# Patient Record
Sex: FEMALE | Race: BLACK OR AFRICAN AMERICAN | Employment: UNEMPLOYED | URBAN - METROPOLITAN AREA
[De-identification: names, ages, dates, MRNs, and addresses within clinical notes are randomized per-mention and may not be internally consistent; named-entity substitution may affect disease eponyms.]

---

## 2017-08-25 ENCOUNTER — APPOINTMENT (OUTPATIENT)
Dept: GENERAL RADIOLOGY | Age: 57
End: 2017-08-25
Attending: PHYSICIAN ASSISTANT
Payer: COMMERCIAL

## 2017-08-25 ENCOUNTER — HOSPITAL ENCOUNTER (EMERGENCY)
Age: 57
Discharge: HOME | End: 2017-08-25
Attending: EMERGENCY MEDICINE | Admitting: EMERGENCY MEDICINE
Payer: COMMERCIAL

## 2017-08-25 VITALS
TEMPERATURE: 97.9 F | BODY MASS INDEX: 41.06 KG/M2 | HEIGHT: 66 IN | OXYGEN SATURATION: 100 % | SYSTOLIC BLOOD PRESSURE: 149 MMHG | WEIGHT: 255.51 LBS | DIASTOLIC BLOOD PRESSURE: 84 MMHG | RESPIRATION RATE: 18 BRPM

## 2017-08-25 DIAGNOSIS — M17.0 PRIMARY OSTEOARTHRITIS OF BOTH KNEES: Primary | ICD-10-CM

## 2017-08-25 DIAGNOSIS — M25.462 EFFUSION, LEFT KNEE: ICD-10-CM

## 2017-08-25 DIAGNOSIS — M25.561 ACUTE BILATERAL KNEE PAIN: ICD-10-CM

## 2017-08-25 DIAGNOSIS — M25.562 ACUTE BILATERAL KNEE PAIN: ICD-10-CM

## 2017-08-25 PROCEDURE — 73562 X-RAY EXAM OF KNEE 3: CPT

## 2017-08-25 PROCEDURE — 99282 EMERGENCY DEPT VISIT SF MDM: CPT

## 2017-08-25 PROCEDURE — 74011636637 HC RX REV CODE- 636/637: Performed by: PHYSICIAN ASSISTANT

## 2017-08-25 PROCEDURE — 74011250637 HC RX REV CODE- 250/637: Performed by: PHYSICIAN ASSISTANT

## 2017-08-25 RX ORDER — NAPROXEN 250 MG/1
500 TABLET ORAL
Status: COMPLETED | OUTPATIENT
Start: 2017-08-25 | End: 2017-08-25

## 2017-08-25 RX ORDER — MELOXICAM 15 MG/1
15 TABLET ORAL DAILY
Qty: 30 TAB | Refills: 0 | Status: SHIPPED | OUTPATIENT
Start: 2017-08-25 | End: 2020-09-26

## 2017-08-25 RX ORDER — HYDROCODONE BITARTRATE AND ACETAMINOPHEN 5; 325 MG/1; MG/1
1 TABLET ORAL
Qty: 20 TAB | Refills: 0 | Status: SHIPPED | OUTPATIENT
Start: 2017-08-25 | End: 2020-09-26

## 2017-08-25 RX ORDER — GUAIFENESIN 100 MG/5ML
81 LIQUID (ML) ORAL
COMMUNITY
End: 2021-09-17

## 2017-08-25 RX ORDER — CETIRIZINE HCL 10 MG
10 TABLET ORAL
COMMUNITY

## 2017-08-25 RX ORDER — HYDROCODONE BITARTRATE AND ACETAMINOPHEN 5; 325 MG/1; MG/1
1 TABLET ORAL
Status: COMPLETED | OUTPATIENT
Start: 2017-08-25 | End: 2017-08-25

## 2017-08-25 RX ORDER — PREDNISONE 20 MG/1
60 TABLET ORAL
Status: COMPLETED | OUTPATIENT
Start: 2017-08-25 | End: 2017-08-25

## 2017-08-25 RX ORDER — DICLOFENAC SODIUM 75 MG/1
75 TABLET, DELAYED RELEASE ORAL
COMMUNITY
End: 2017-08-25 | Stop reason: ALTCHOICE

## 2017-08-25 RX ORDER — PREDNISONE 5 MG/1
TABLET ORAL
Qty: 21 TAB | Refills: 0 | Status: SHIPPED | OUTPATIENT
Start: 2017-08-25 | End: 2021-09-17

## 2017-08-25 RX ADMIN — HYDROCODONE BITARTRATE AND ACETAMINOPHEN 1 TABLET: 5; 325 TABLET ORAL at 18:04

## 2017-08-25 RX ADMIN — PREDNISONE 60 MG: 20 TABLET ORAL at 19:23

## 2017-08-25 RX ADMIN — NAPROXEN 500 MG: 250 TABLET ORAL at 18:04

## 2017-08-25 NOTE — ED NOTES
Pt reports to the ED for c/c of bilateral knee pain ongoing for months. Pt reports left knee \"popping\" and  unable to bend, hurts when walking. Pt reports taking OTC and Voltaren with no relief. Pt currently being seeing by [facility]. Pt denies injuries to the knees. Call bell within reach.

## 2017-08-25 NOTE — LETTER
[facility] EMERGENCY DEPT 
[address] [place] [address] [address] [id number] [phone] Work/School Note Date: 8/25/2017 To Whom It May concern: 
 
Rochelle Butler was seen and treated today in the emergency room by the following provider(s): 
Attending Provider: Lauren Mooney MD 
Physician Assistant: MIROSLAVA Thurston. Rochelle Butler may return to work on 8/28/17 or sooner, if feeling better. Sincerely, Joseluis Zavala PA

## 2017-08-25 NOTE — ED NOTES
MIROSLAVA Matta has reviewed discharge instructions with the patient. The patient verbalized understanding. Pt discharged with written instructions. No further concerns at this time. Pt given prescriptions and ED excuse note. Pt ambulatory to exit with steady gait.

## 2017-08-25 NOTE — DISCHARGE INSTRUCTIONS
Knee Arthritis: Care Instructions  Your Care Instructions  Knee arthritis is a breakdown of the cartilage that cushions your knee joint. When the cartilage wears down, your bones rub against each other. This causes pain and stiffness. Knee arthritis tends to get worse with time. Treatment for knee arthritis involves reducing pain, making the leg muscles stronger, and staying at a healthy body weight. The treatment usually does not improve the health of the cartilage, but it can reduce pain and improve how well your knee works. You can take simple measures to protect your knee joints, ease your pain, and help you stay active. Follow-up care is a key part of your treatment and safety. Be sure to make and go to all appointments, and call your doctor if you are having problems. It's also a good idea to know your test results and keep a list of the medicines you take. How can you care for yourself at home? · Know that knee arthritis will cause more pain on some days than on others. · Stay at a healthy weight. Lose weight if you are overweight. When you stand up, the pressure on your knees from every pound of body weight is multiplied four times. So if you lose 10 pounds, you will reduce the pressure on your knees by 40 pounds. · Talk to your doctor or physical therapist about exercises that will help ease joint pain. ¨ Stretch to help prevent stiffness and to prevent injury before you exercise. You may enjoy gentle forms of yoga to help keep your knee joints and muscles flexible. ¨ Walk instead of jog. ¨ Ride a bike. This makes your thigh muscles stronger and takes pressure off your knee. ¨ Wear well-fitting and comfortable shoes. ¨ Exercise in chest-deep water. This can help you exercise longer with less pain. ¨ Avoid exercises that include squatting or kneeling. They can put a lot of strain on your knees.   ¨ Talk to your doctor to make sure that the exercise you do is not making the arthritis worse.  · Do not sit for long periods of time. Try to walk once in a while to keep your knee from getting stiff. · Ask your doctor or physical therapist whether shoe inserts may reduce your arthritis pain. · If you can afford it, get new athletic shoes at least every year. This can help reduce the strain on your knees. · Use a device to help you do everyday activities. ¨ A cane or walking stick can help you keep your balance when you walk. Hold the cane or walking stick in the hand opposite the painful knee. ¨ If you feel like you may fall when you walk, try using crutches or a front-wheeled walker. These can prevent falls that could cause more damage to your knee. ¨ A knee brace may help keep your knee stable and prevent pain. ¨ You also can use other things to make life easier, such as a higher toilet seat and handrails in the bathtub or shower. · Take pain medicines exactly as directed. ¨ Do not wait until you are in severe pain. You will get better results if you take it sooner. ¨ If you are not taking a prescription pain medicine, take an over-the-counter medicine such as acetaminophen (Tylenol), ibuprofen (Advil, Motrin), or naproxen (Aleve). Read and follow all instructions on the label. ¨ Do not take two or more pain medicines at the same time unless the doctor told you to. Many pain medicines have acetaminophen, which is Tylenol. Too much acetaminophen (Tylenol) can be harmful. ¨ Tell your doctor if you take a blood thinner, have diabetes, or have allergies to shellfish. · Ask your doctor if you might benefit from a shot of steroid medicine into your knee. This may provide pain relief for several months. · Many people take the supplements glucosamine and chondroitin for osteoarthritis. Some people feel they help, but the medical research does not show that they work. Talk to your doctor before you take these supplements. When should you call for help?   Call your doctor now or seek immediate medical care if:  · You have sudden swelling, warmth, or pain in your knee. · You have knee pain and a fever or rash. · You have such bad pain that you cannot use your knee. Watch closely for changes in your health, and be sure to contact your doctor if you have any problems. Where can you learn more? Go to http://terrance-ankur.info/. Enter Z469 in the search box to learn more about \"Knee Arthritis: Care Instructions. \"  Current as of: October 31, 2016  Content Version: 11.3  © 9282-9815 [employer]. Care instructions adapted under license by [employer] (which disclaims liability or warranty for this information). If you have questions about a medical condition or this instruction, always ask your healthcare professional. [place] [address] any warranty or liability for your use of this information. Knee Arthritis: Exercises  Your Care Instructions  Here are some examples of exercises for knee arthritis. Start each exercise slowly. Ease off the exercise if you start to have pain. Your doctor or physical therapist will tell you when you can start these exercises and which ones will work best for you. How to do the exercises  Knee flexion with heel slide    1. Lie on your back with your knees bent. 2. Slide your heel back by bending your affected knee as far as you can. Then hook your other foot around your ankle to help pull your heel even farther back. 3. Hold for about 6 seconds, then rest for up to 10 seconds. 4. Repeat 8 to 12 times. 5. Switch legs and repeat steps 1 through 4, even if only one knee is sore. Quad sets    1. Sit with your affected leg straight and supported on the floor or a firm bed. Place a small, rolled-up towel under your knee. Your other leg should be bent, with that foot flat on the floor. 2. Tighten the thigh muscles of your affected leg by pressing the back of your knee down into the towel.   3. Hold for about 6 seconds, then rest for up to 10 seconds. 4. Repeat 8 to 12 times. 5. Switch legs and repeat steps 1 through 4, even if only one knee is sore. Straight-leg raises to the front    1. Lie on your back with your good knee bent so that your foot rests flat on the floor. Your affected leg should be straight. Make sure that your low back has a normal curve. You should be able to slip your hand in between the floor and the small of your back, with your palm touching the floor and your back touching the back of your hand. 2. Tighten the thigh muscles in your affected leg by pressing the back of your knee flat down to the floor. Hold your knee straight. 3. Keeping the thigh muscles tight and your leg straight, lift your affected leg up so that your heel is about 12 inches off the floor. Hold for about 6 seconds, then lower slowly. 4. Relax for up to 10 seconds between repetitions. 5. Repeat 8 to 12 times. 6. Switch legs and repeat steps 1 through 5, even if only one knee is sore. Active knee flexion    1. Lie on your stomach with your knees straight. If your kneecap is uncomfortable, roll up a washcloth and put it under your leg just above your kneecap. 2. Lift the foot of your affected leg by bending the knee so that you bring the foot up toward your buttock. If this motion hurts, try it without bending your knee quite as far. This may help you avoid any painful motion. 3. Slowly move your leg up and down. 4. Repeat 8 to 12 times. 5. Switch legs and repeat steps 1 through 4, even if only one knee is sore. Quadriceps stretch (facedown)    1. Lie flat on your stomach, and rest your face on the floor. 2. Wrap a towel or belt strap around the lower part of your affected leg. Then use the towel or belt strap to slowly pull your heel toward your buttock until you feel a stretch. 3. Hold for about 15 to 30 seconds, then relax your leg against the towel or belt strap. 4. Repeat 2 to 4 times.   5. Switch legs and repeat steps 1 through 4, even if only one knee is sore. Stationary exercise bike    If you do not have a stationary exercise bike at home, you can find one to ride at your local health club or community center. 1. Adjust the height of the bike seat so that your knee is slightly bent when your leg is extended downward. If your knee hurts when the pedal reaches the top, you can raise the seat so that your knee does not bend as much. 2. Start slowly. At first, try to do 5 to 10 minutes of cycling with little to no resistance. Then increase your time and the resistance bit by bit until you can do 20 to 30 minutes without pain. 3. If you start to have pain, rest your knee until your pain gets back to the level that is normal for you. Or cycle for less time or with less effort. Follow-up care is a key part of your treatment and safety. Be sure to make and go to all appointments, and call your doctor if you are having problems. It's also a good idea to know your test results and keep a list of the medicines you take. Where can you learn more? Go to http://terrance-ankur.info/. Enter C159 in the search box to learn more about \"Knee Arthritis: Exercises. \"  Current as of: March 21, 2017  Content Version: 11.3  © 9104-3083 [employer]. Care instructions adapted under license by [employer] (which disclaims liability or warranty for this information). If you have questions about a medical condition or this instruction, always ask your healthcare professional. [place] [address] any warranty or liability for your use of this information.

## 2017-08-25 NOTE — ED PROVIDER NOTES
HPI Comments: David Ramirez is a 61 y.o. female without significant PMHx, presenting ambulatory to ED c/o intermittent BL knee pain (L>R) for the past few months. Pt rates current pain 9/10 and notes it is unchanged with taking tylenol/motrin or Diclofenac. She notes her left knee has been swelling intermittently and \"locks up\" and \"pops\" during ambulation. Pt reports she has been evaluated at the [facility] clinic and told she has arthritis in her knees. Per pt records, she [facility]  [facility] evaluation of her BL knee pain. Pt endorses the pain has continued since evaluation, especially in her left knee, prompting ED evaluation today. She denies recent fall/injury/trauma. Pt notes she has a ride home. She denies history of DM or liver/thyroid/kidney disease. Pt specifically denies erythema to her knees. PCP: Not On File [facility]  Social Hx: never smoker; + EtOH (rare); - drug use. There are no other complaints, changes, or physical findings at this time. Written by Kian Olivares ED Scribe, as dictated by Jagdish Douglass PA-C. The history is provided by the patient. Past Medical History:   Diagnosis Date    Menopause     age 38       No past surgical history on file. No family history on file. Social History     Social History    Marital status: SINGLE     Spouse name: N/A    Number of children: N/A    Years of education: N/A     Occupational History    Not on file. Social History Main Topics    Smoking status: Not on file    Smokeless tobacco: Not on file    Alcohol use Not on file    Drug use: Not on file    Sexual activity: Not on file     Other Topics Concern    Not on file     Social History Narrative    No narrative on file         ALLERGIES: Penicillins    Review of Systems   Constitutional: Negative. Negative for fever. HENT: Negative. Eyes: Negative. Respiratory: Negative. Cardiovascular: Negative.     Gastrointestinal: Negative. Negative for constipation, diarrhea, nausea and vomiting. Denies liver disease   Endocrine:        Denies thyroid disease   Genitourinary: Negative. Negative for dysuria. Denies kidney disease   Musculoskeletal: Positive for arthralgias (BL knees (L>R), + swelling to left knee). Denies erythema to BL knees;   Skin: Negative. Neurological: Negative. All other systems reviewed and are negative. Vitals:    08/25/17 1741   BP: 149/84   Resp: 18   Temp: 97.9 °F (36.6 °C)   SpO2: 100%   Weight: 115.9 kg (255 lb 8.2 oz)   Height: 5' 6\" (1.676 m)            Physical Exam   Constitutional: She is oriented to person, place, and time. She appears well-developed and well-nourished. No distress. HENT:   Head: Normocephalic and atraumatic. Right Ear: External ear normal.   Left Ear: External ear normal.   Nose: Nose normal.   Mouth/Throat: Oropharynx is clear and moist. No oropharyngeal exudate. Eyes: Conjunctivae and EOM are normal. Pupils are equal, round, and reactive to light. Right eye exhibits no discharge. Left eye exhibits no discharge. No scleral icterus. Neck: Normal range of motion. Neck supple. No tracheal deviation present. Cardiovascular: Normal rate, regular rhythm, normal heart sounds and intact distal pulses. Exam reveals no gallop and no friction rub. No murmur heard. Pulmonary/Chest: Effort normal and breath sounds normal. No respiratory distress. She has no wheezes. She has no rales. She exhibits no tenderness. Abdominal: Soft. Bowel sounds are normal. She exhibits no distension and no mass. There is no tenderness. There is no rebound and no guarding. Musculoskeletal:   Tenderness to anterior knees bilaterally; tenderness to medial and lateral joint lines bilaterally, no erythema or contusion; NVI distally; no bony tenderness of BL ankles or BL hips;   Lymphadenopathy:     She has no cervical adenopathy.    Neurological: She is alert and oriented to person, place, and time. No cranial nerve deficit. Skin: Skin is warm and dry. No rash noted. No erythema. Psychiatric: She has a normal mood and affect. Her behavior is normal.   Nursing note and vitals reviewed. MDM  Number of Diagnoses or Management Options  Acute bilateral knee pain:   Effusion, left knee:   Primary osteoarthritis of both knees:   Diagnosis management comments: DDx: effusion, arthritis, strain, sprain. Amount and/or Complexity of Data Reviewed  Tests in the radiology section of CPT®: ordered and reviewed  Review and summarize past medical records: yes  Independent visualization of images, tracings, or specimens: yes    Patient Progress  Patient progress: stable    Procedures    IMAGING RESULTS:  EXAM:  XR KNEE LT 3 V, XR KNEE RT 3 V     INDICATION:  Bilateral knee pain without injury for a few weeks.     COMPARISON: None.     FINDINGS:      Three views of the right demonstrate no fracture. There is severe medial  osteoarthritis with subchondral cyst formation and sclerosis. Note is made of  minimal patellofemoral osteoarthritis and trace joint fluid.      Three views of the left knee demonstrate no fracture. There is severe medial and  mild patellofemoral and lateral osteoarthritis. A small joint effusion is  present.     IMPRESSION  IMPRESSION:       Osteoarthritis, most pronounced in the medial compartments and most pronounced  on the right. No acute fracture  Small left knee effusion               EXAM:  XR KNEE LT 3 V, XR KNEE RT 3 V     INDICATION:  Bilateral knee pain without injury for a few weeks.     COMPARISON: None.     FINDINGS:      Three views of the right demonstrate no fracture. There is severe medial  osteoarthritis with subchondral cyst formation and sclerosis. Note is made of  minimal patellofemoral osteoarthritis and trace joint fluid.      Three views of the left knee demonstrate no fracture.  There is severe medial and  mild patellofemoral and lateral osteoarthritis. A small joint effusion is  present.     IMPRESSION  IMPRESSION:       Osteoarthritis, most pronounced in the medial compartments and most pronounced  on the right. No acute fracture  Small left knee effusion       MEDICATIONS GIVEN:  Medications   HYDROcodone-acetaminophen (NORCO) 5-325 mg per tablet 1 Tab (1 Tab Oral Given 8/25/17 1804)   naproxen (NAPROSYN) tablet 500 mg (500 mg Oral Given 8/25/17 1804)       IMPRESSION:  1. Primary osteoarthritis of both knees    2. Acute bilateral knee pain    3. Effusion, left knee        PLAN:  1. Current Discharge Medication List        2. Follow-up Information     Follow up With Details Comments Contact Info    Lindsay Salomon MD  knee specialist [address]  [address] [place] [address]  [address] [phone]          Return to ED if worse     DISCHARGE NOTE  7:09 PM  The patient has been re-evaluated and is ready for discharge. Reviewed available results with patient. Counseled pt on diagnosis and care plan. Pt has expressed understanding, and all questions have been answered. Pt agrees with plan and agrees to F/U as recommended, or return to the ED if their sxs worsen. Discharge instructions have been provided and explained to the pt, along with reasons to return to the ED. Written by Kian Olivares, ED Scribe, as dictated by Jagdish Douglass PA-C. This note is prepared by Kian Olivares, acting as Scribe for [employer]. Jagdish Douglass PA-C: The scribe's documentation has been prepared under my direction and personally reviewed by me in its entirety. I confirm that the note above accurately reflects all work, treatment, procedures, and medical decision making performed by me.

## 2021-03-24 ENCOUNTER — HOSPITAL ENCOUNTER (OUTPATIENT)
Dept: LAB | Age: 61
Discharge: HOME | End: 2021-03-24

## 2021-03-24 PROCEDURE — 80061 LIPID PANEL: CPT

## 2021-03-25 LAB
CHOLEST SERPL-MCNC: 198 MG/DL
HDLC SERPL-MCNC: 72 MG/DL
HDLC SERPL: 2.8 {RATIO} (ref 0–5)
LDLC SERPL CALC-MCNC: 118 MG/DL (ref 0–100)
LIPID PROFILE,FLP: ABNORMAL
TRIGL SERPL-MCNC: 40 MG/DL (ref ?–150)
VLDLC SERPL CALC-MCNC: 8 MG/DL

## 2021-04-14 ENCOUNTER — HOSPITAL ENCOUNTER (OUTPATIENT)
Dept: ULTRASOUND IMAGING | Age: 61
Discharge: HOME | End: 2021-04-14
Payer: MEDICAID

## 2021-04-14 DIAGNOSIS — G47.33 OBSTRUCTIVE SLEEP APNEA (ADULT) (PEDIATRIC): ICD-10-CM

## 2021-04-14 DIAGNOSIS — I48.0 AF (PAROXYSMAL ATRIAL FIBRILLATION) (HCC): ICD-10-CM

## 2021-04-14 LAB
ECHO AO ROOT DIAM: 3.12 CM
ECHO AV PEAK GRADIENT: 6.05 MMHG
ECHO AV PEAK VELOCITY: 122.99 CM/S
ECHO EST RA PRESSURE: 10 MMHG
ECHO LA MAJOR AXIS: 3.62 CM
ECHO LV E' SEPTAL VELOCITY: 10.34 CM/S
ECHO LV INTERNAL DIMENSION DIASTOLIC: 5.39 CM (ref 3.9–5.3)
ECHO LV INTERNAL DIMENSION SYSTOLIC: 3.19 CM
ECHO LV IVSD: 1.06 CM (ref 0.6–0.9)
ECHO LV MASS 2D: 207.5 G (ref 67–162)
ECHO LV POSTERIOR WALL DIASTOLIC: 0.95 CM (ref 0.6–0.9)
ECHO LVOT DIAM: 2.23 CM
ECHO MV A VELOCITY: 64.01 CM/S
ECHO MV E DECELERATION TIME (DT): 220.25 MS
ECHO MV E VELOCITY: 93.57 CM/S
ECHO MV E/A RATIO: 1.46
ECHO MV E/E' SEPTAL: 9.05
ECHO RIGHT VENTRICULAR SYSTOLIC PRESSURE (RVSP): 28.4 MMHG
ECHO TV REGURGITANT MAX VELOCITY: 213.17 CM/S
ECHO TV REGURGITANT PEAK GRADIENT: 18.4 MMHG

## 2021-04-14 PROCEDURE — 93306 TTE W/DOPPLER COMPLETE: CPT

## 2021-04-20 ENCOUNTER — OFFICE VISIT (OUTPATIENT)
Dept: CARDIOLOGY CLINIC | Age: 61
End: 2021-04-20
Payer: MEDICAID

## 2021-04-20 ENCOUNTER — CLINICAL SUPPORT (OUTPATIENT)
Dept: CARDIOLOGY CLINIC | Age: 61
End: 2021-04-20
Payer: MEDICAID

## 2021-04-20 VITALS
RESPIRATION RATE: 16 BRPM | DIASTOLIC BLOOD PRESSURE: 74 MMHG | OXYGEN SATURATION: 98 % | BODY MASS INDEX: 40.34 KG/M2 | SYSTOLIC BLOOD PRESSURE: 132 MMHG | WEIGHT: 251 LBS | HEIGHT: 66 IN | HEART RATE: 66 BPM

## 2021-04-20 DIAGNOSIS — R00.2 PALPITATIONS: ICD-10-CM

## 2021-04-20 DIAGNOSIS — I48.0 AF (PAROXYSMAL ATRIAL FIBRILLATION) (HCC): ICD-10-CM

## 2021-04-20 DIAGNOSIS — I10 ESSENTIAL HYPERTENSION: ICD-10-CM

## 2021-04-20 DIAGNOSIS — I48.0 AF (PAROXYSMAL ATRIAL FIBRILLATION) (HCC): Primary | ICD-10-CM

## 2021-04-20 PROBLEM — E66.01 CLASS 3 SEVERE OBESITY DUE TO EXCESS CALORIES WITHOUT SERIOUS COMORBIDITY IN ADULT (HCC): Status: ACTIVE | Noted: 2021-04-20

## 2021-04-20 PROBLEM — G47.33 OSA (OBSTRUCTIVE SLEEP APNEA): Status: ACTIVE | Noted: 2021-04-20

## 2021-04-20 PROCEDURE — 99203 OFFICE O/P NEW LOW 30 MIN: CPT | Performed by: INTERNAL MEDICINE

## 2021-04-20 NOTE — PROGRESS NOTES
Chief Complaint   Patient presents with    New Patient     referred by Dr. Lori Palencia for AFIB    Irregular Heart Beat     Verified patient with two patient identifiers. Medications reviewed/approved by Dr. Ivan Zapata. 1. Have you been to the ER, urgent care clinic since your last visit? Hospitalized since your last visit? new pt     2. Have you seen or consulted any other health care providers outside of the [address] [place] [address] since your last visit? Include any pap smears or colon screening.  New pt

## 2021-04-20 NOTE — LETTER
4/20/2021 Patient: Axel Ramirez YOB: 1960 Date of Visit: 4/20/2021 Fatou Jeffries MD 
[address] [place] [phone] Via Fax: [phone] Dear Fatou Jeffries MD, Thank you for referring Ms. Axel Ramirez to [facility] for evaluation. My notes for this consultation are attached. If you have questions, please do not hesitate to call me. I look forward to following your patient along with you.  
 
 
Sincerely, 
 
Rita Waters MD

## 2021-04-20 NOTE — PROGRESS NOTES
OFFICE  hook up  HOLTER 48 hr monitor only. Verified patient with two patient identifiers. Patient verbalized understanding of its use. Ordering BRITTNEY Guallpa Locus  Reason: AF (paroxysmal atrial fibrillation) ([facility] [I48.0 (ICD-10-CM)]; Palpitations [R00.2 (ICD-10-CM)]  Start time: 10:15am    Patient has been successfully enrolled through [employer]. No LOS.

## 2021-04-20 NOTE — PROGRESS NOTES
Papi Stone is a 63 y.o. female is here for cardiac evaluation. Hx hypertension, BURTON (CPAP), migraine headaches, DJD, fibromyalgia,obesity. followed at [facility]. Recent sx of palpitations/tachycardia--dx'd afib by Dr Rosy Manning here for evaluation and Holter. Echo 4/14/21 with normal chambers, valves, function--LVEF 65-70, normal RVSP. No hx DM, TIA/CVA/vascular disease, other. On amlodipine + HCTZ for hypertension. Hx ACEI allergy with angioedema. Occasional palpitations, no prolonged episodes. The patient denies chest pain/ shortness of breath, orthopnea, PND, LE edema,  syncope, presyncope or fatigue.        Patient Active Problem List    Diagnosis Date Noted    Essential hypertension 04/20/2021    Paroxysmal atrial fibrillation ([facility] [address] 04/20/2021    BURTON (obstructive sleep apnea) 04/20/2021    Class 3 severe obesity due to excess calories without serious comorbidity in adult [facility]) 04/20/2021    Fibromyalgia     Migraine     DJD (degenerative joint disease)       Lisa Rainey MD  Past Medical History:   Diagnosis Date    DJD (degenerative joint disease)     Fibromyalgia     Hypertension     Ill-defined condition     cholestrol    Menopause     age 38    Migraine     Paroxysmal atrial fibrillation ([facility] [address] 4/20/2021    Sleep disorder     sleep apnea uses CPAP at night      Past Surgical History:   Procedure Laterality Date    HX CATARACT REMOVAL Right 09/05/2020    HX HEENT      right eye cataract     Allergies   Allergen Reactions    Ace Inhibitors Angioedema    Aspirin Other (comments)    Penicillins Hives    Sulfa (Sulfonamide Antibiotics) Unable to Obtain      Family History   Problem Relation Age of Onset    Diabetes Mother     Hypertension Sister       Social History     Socioeconomic History    Marital status:      Spouse name: Not on file    Number of children: Not on file    Years of education: Not on file    Highest education level: Not on file Occupational History    Not on file   Social Needs    Financial resource strain: Not on file    Food insecurity     Worry: Not on file     Inability: Not on file    Transportation needs     Medical: Not on file     Non-medical: Not on file   Tobacco Use    Smoking status: Never Smoker    Smokeless tobacco: Never Used   Substance and Sexual Activity    Alcohol use: Never     Frequency: Never     Comment: socially    Drug use: No    Sexual activity: Never   Lifestyle    Physical activity     Days per week: Not on file     Minutes per session: Not on file    Stress: Not on file   Relationships    Social connections     Talks on phone: Not on file     Gets together: Not on file     Attends Judaism service: Not on file     Active member of club or organization: Not on file     Attends meetings of clubs or organizations: Not on file     Relationship status: Not on file    Intimate partner violence     Fear of current or ex partner: Not on file     Emotionally abused: Not on file     Physically abused: Not on file     Forced sexual activity: Not on file   Other Topics Concern    Not on file   Social History Narrative    Not on file      Current Outpatient Medications   Medication Sig    hydroCHLOROthiazide (MICROZIDE) 12.5 mg capsule hydrochlorothiazide 12.5 mg capsule   Take 1 capsule every day by oral route.  FLUoxetine (PROzac) 40 mg capsule TAKE 1 CAPSULE BY MOUTH ONCE DAILY    alendronate (FOSAMAX) 70 mg tablet TAKE ONE TABLET BY MOUTH ONCE A WEEK ON AN EMPTY STOMACH 30 MINUTES BEFORE BREAKFAST WITH WATER. DO NOT LAY DOWN AFTER TAKING    omeprazole (PRILOSEC) 20 mg capsule TAKE 1 CAPSULE BY MOUTH IN THE MORNING FOR GERD    oxybutynin (DITROPAN) 5 mg tablet oxybutynin chloride 5 mg tablet   Take 1 tablet twice a day by oral route.  cetirizine (ZyrTEC) 10 mg tablet Zyrtec 10 mg tablet   Take 1 tablet every day by oral route.  amLODIPine (NORVASC) 5 mg tablet Take 1 Tab by mouth daily.     aspirin 81 mg chewable tablet Take 81 mg by mouth.  cetirizine (ZYRTEC) 10 mg tablet Take 10 mg by mouth.  divalproex ER (DEPAKOTE ER) 250 mg ER tablet TAKE 1 TABLET BY MOUTH THREE TIMES DAILY    predniSONE (DELTASONE) 1 mg tablet TAKE 4 TABLETS BY MOUTH DAILY IN THE MORNING FOR 2 WEEKS TAKE 3 TABLETS DAILY FOR 2 WEEKS TAKE 2 TABS DAILY FOR 2 WEEKS TAKE 1 TABLET DAILY    predniSONE (STERAPRED) 5 mg dose pack See administration instruction per 5mg dose pack     No current facility-administered medications for this visit. Review of Symptoms:    CONST  No weight change. No fever, chills, sweats    ENT No visual changes, URI sx, sore throat    CV  See HPI   RESP  No cough, or sputum, wheezing. Also see HPI   GI  No abdominal pain or change in bowel habits. No heartburn or dysphagia. No melena or rectal bleeding.   No dysuria, urgency, frequency, hematuria   MSKEL  No joint pain, swelling. No muscle pain. SKIN  No rash or lesions. NEURO  No headache, syncope, or seizure. No weakness, loss of sensation, or paresthesias. PSYCH  No low mood or depression  No anxiety. HE/LYMPH  No easy bruising, abnormal bleeding, or enlarged glands. Physical ExamPhysical Exam:    Visit Vitals  /74 (BP 1 Location: Left arm, BP Patient Position: Sitting, BP Cuff Size: Adult)   Pulse 66   Resp 16   Ht 5' 6\" (1.676 m)   Wt 251 lb (113.9 kg)   SpO2 98%   BMI 40.51 kg/m²     Gen: NAD  HEENT:  PERRL, throat clear  Neck: no adenopathy, no thyromegaly, no JVD   Heart:  Regular,Nl S1S2,  no murmur, gallop or rub. Lungs:  clear  Abdomen:   Soft, non-tender, bowel sounds are active. Extremities:  No edema  Pulse: symmetric  Neuro: A&O times 3, No focal neuro deficits    Cardiographics    ECG: from [facility] 4/1/21--NSR, prominent voltage, otherwise normal  (scanned).      Labs:   Lab Results   Component Value Date/Time    Sodium 140 09/07/2018 08:31 AM    Potassium 3.8 09/07/2018 08:31 AM    Chloride 103 09/07/2018 08:31 AM    CO2 28 09/07/2018 08:31 AM    Anion gap 9 09/07/2018 08:31 AM    Glucose 87 09/07/2018 08:31 AM    BUN 19 09/07/2018 08:31 AM    Creatinine 0.73 09/07/2018 08:31 AM    BUN/Creatinine ratio 26 (H) 09/07/2018 08:31 AM    GFR est AA >60 09/07/2018 08:31 AM    GFR est non-AA >60 09/07/2018 08:31 AM    Calcium 9.6 09/07/2018 08:31 AM    Bilirubin, total 0.3 04/01/2020 10:31 AM    Bilirubin, total 0.3 09/07/2018 08:31 AM    Alk. phosphatase 72 04/01/2020 10:31 AM    Alk. phosphatase 84 09/07/2018 08:31 AM    Protein, total 7.0 09/07/2018 08:31 AM    Albumin 3.7 09/07/2018 08:31 AM    Globulin 3.3 09/07/2018 08:31 AM    A-G Ratio 1.1 09/07/2018 08:31 AM    ALT (SGPT) 27 05/20/2020 12:40 PM    ALT (SGPT) 25 04/01/2020 10:31 AM    ALT (SGPT) 25 02/21/2019 07:30 PM    ALT (SGPT) 25 09/07/2018 08:31 AM     No results found for: CPK, CPKX, CPX  Lab Results   Component Value Date/Time    Cholesterol, total 198 03/24/2021 12:57 PM    Cholesterol, total 252 (H) 10/14/2020 04:51 PM    Cholesterol, total 188 09/07/2018 08:31 AM    HDL Cholesterol 72 03/24/2021 12:57 PM    HDL Cholesterol 77 10/14/2020 04:51 PM    HDL Cholesterol 67 09/07/2018 08:31 AM    LDL, calculated 118 (H) 03/24/2021 12:57 PM    LDL, calculated 164.4 (H) 10/14/2020 04:51 PM    LDL, calculated 115.8 (H) 09/07/2018 08:31 AM    Triglyceride 40 03/24/2021 12:57 PM    Triglyceride 53 10/14/2020 04:51 PM    Triglyceride 26 09/07/2018 08:31 AM    CHOL/HDL Ratio 2.8 03/24/2021 12:57 PM    CHOL/HDL Ratio 3.3 10/14/2020 04:51 PM    CHOL/HDL Ratio 2.8 09/07/2018 08:31 AM     No results found for this or any previous visit.     Assessment:         Patient Active Problem List    Diagnosis Date Noted    Essential hypertension 04/20/2021    Paroxysmal atrial fibrillation ([facility] [address] 04/20/2021    BURTON (obstructive sleep apnea) 04/20/2021    Class 3 severe obesity due to excess calories without serious comorbidity in adult [facility]) 04/20/2021    Fibromyalgia     Migraine     DJD (degenerative joint disease)      Hx hypertension, BURTON (CPAP), migraine headaches, DJD, fibromyalgia,obesity. followed at [facility]. Recent sx of palpitations/tachycardia--dx'd afib by Dr Adam Gonzalez here for evaluation and Holter. Echo 4/14/21 with normal chambers, valves, function--LVEF 65-70, normal RVSP. No hx DM, TIA/CVA/vascular disease, other. On amlodipine + HCTZ for hypertension. Hx ACEI allergy with angioedema. Occasional palpitations, no prolonged episodes.    CHADs1-VASC 1-2 (hypertension, female)--currently ASA only     Plan:     Echo reviewed and discussed  Holter monitor x 48 hrs--r/o afib  Continue current meds/rx  Continue ASA  Continue CPAP, diet  Consider low dose beta blocker vs cardizem if significant afib/palps  No current indication for ischemia w/u  Fu with PCP as [facility] as planned    Skyler Caruso MD

## 2021-04-28 PROCEDURE — 93225 XTRNL ECG REC<48 HRS REC: CPT | Performed by: INTERNAL MEDICINE

## 2021-04-28 PROCEDURE — 93227 XTRNL ECG REC<48 HR R&I: CPT | Performed by: INTERNAL MEDICINE

## 2021-04-29 ENCOUNTER — TELEPHONE (OUTPATIENT)
Dept: CARDIOLOGY CLINIC | Age: 61
End: 2021-04-29

## 2021-04-29 NOTE — TELEPHONE ENCOUNTER
Patients daughter Isela Fonseca) Ame Murphy returning your call regarding test results     [phone]    Thanks  Donny Mccabe

## 2021-04-29 NOTE — TELEPHONE ENCOUNTER
----- Message from Efrain Sherman MD sent at 4/28/2021  1:04 PM EDT -----  Regarding: Holter  Normal sinus rhythm throughout--no afib, no other arrhythmias. Can fu prn unless more sx.   Thanks [facility]

## 2021-04-30 NOTE — TELEPHONE ENCOUNTER
Spoke with the patients daughter (on hippa). Verified patient with two patient identifiers. Results given and questions answered. Patients daughter verbalized understanding. Pts daughter requested a 6 mo appt. Scheduled.

## 2021-07-26 ENCOUNTER — HOSPITAL ENCOUNTER (EMERGENCY)
Age: 61
Discharge: HOME | End: 2021-07-26
Attending: EMERGENCY MEDICINE
Payer: MEDICAID

## 2021-07-26 ENCOUNTER — APPOINTMENT (OUTPATIENT)
Dept: GENERAL RADIOLOGY | Age: 61
End: 2021-07-26
Attending: EMERGENCY MEDICINE
Payer: MEDICAID

## 2021-07-26 ENCOUNTER — APPOINTMENT (OUTPATIENT)
Dept: CT IMAGING | Age: 61
End: 2021-07-26
Attending: EMERGENCY MEDICINE
Payer: MEDICAID

## 2021-07-26 VITALS
HEART RATE: 75 BPM | TEMPERATURE: 97.9 F | HEIGHT: 66 IN | OXYGEN SATURATION: 96 % | DIASTOLIC BLOOD PRESSURE: 60 MMHG | SYSTOLIC BLOOD PRESSURE: 108 MMHG | BODY MASS INDEX: 39.37 KG/M2 | RESPIRATION RATE: 19 BRPM | WEIGHT: 245 LBS

## 2021-07-26 DIAGNOSIS — R07.89 ATYPICAL CHEST PAIN: Primary | ICD-10-CM

## 2021-07-26 LAB
ALBUMIN SERPL-MCNC: 3.3 G/DL (ref 3.5–5)
ALBUMIN/GLOB SERPL: 0.8 {RATIO} (ref 1.1–2.2)
ALP SERPL-CCNC: 72 U/L (ref 45–117)
ALT SERPL-CCNC: 26 U/L (ref 12–78)
ANION GAP SERPL CALC-SCNC: 9 MMOL/L (ref 5–15)
APPEARANCE UR: ABNORMAL
AST SERPL-CCNC: 21 U/L (ref 15–37)
BACTERIA URNS QL MICRO: NEGATIVE /HPF
BASOPHILS # BLD: 0 K/UL (ref 0–0.1)
BASOPHILS NFR BLD: 0 % (ref 0–1)
BILIRUB SERPL-MCNC: 1.2 MG/DL (ref 0.2–1)
BILIRUB UR QL: NEGATIVE
BNP SERPL-MCNC: 73 PG/ML (ref 0–125)
BUN SERPL-MCNC: 15 MG/DL (ref 6–20)
BUN/CREAT SERPL: 19 (ref 12–20)
CALCIUM SERPL-MCNC: 9 MG/DL (ref 8.5–10.1)
CHLORIDE SERPL-SCNC: 104 MMOL/L (ref 97–108)
CO2 SERPL-SCNC: 26 MMOL/L (ref 21–32)
COLOR UR: ABNORMAL
CREAT SERPL-MCNC: 0.79 MG/DL (ref 0.55–1.02)
DIFFERENTIAL METHOD BLD: ABNORMAL
EOSINOPHIL # BLD: 0 K/UL (ref 0–0.4)
EOSINOPHIL NFR BLD: 0 % (ref 0–7)
EPITH CASTS URNS QL MICRO: ABNORMAL /LPF
ERYTHROCYTE [DISTWIDTH] IN BLOOD BY AUTOMATED COUNT: 14.6 % (ref 11.5–14.5)
GLOBULIN SER CALC-MCNC: 4.1 G/DL (ref 2–4)
GLUCOSE SERPL-MCNC: 116 MG/DL (ref 65–100)
GLUCOSE UR STRIP.AUTO-MCNC: NEGATIVE MG/DL
HCT VFR BLD AUTO: 40.7 % (ref 35–47)
HGB BLD-MCNC: 13.3 G/DL (ref 11.5–16)
HGB UR QL STRIP: ABNORMAL
IMM GRANULOCYTES # BLD AUTO: 0 K/UL (ref 0–0.04)
IMM GRANULOCYTES NFR BLD AUTO: 0 % (ref 0–0.5)
KETONES UR QL STRIP.AUTO: ABNORMAL MG/DL
LEUKOCYTE ESTERASE UR QL STRIP.AUTO: NEGATIVE
LYMPHOCYTES # BLD: 2 K/UL (ref 0.8–3.5)
LYMPHOCYTES NFR BLD: 14 % (ref 12–49)
MAGNESIUM SERPL-MCNC: 1.9 MG/DL (ref 1.6–2.4)
MCH RBC QN AUTO: 29.3 PG (ref 26–34)
MCHC RBC AUTO-ENTMCNC: 32.7 G/DL (ref 30–36.5)
MCV RBC AUTO: 89.6 FL (ref 80–99)
MONOCYTES # BLD: 2.4 K/UL (ref 0–1)
MONOCYTES NFR BLD: 17 % (ref 5–13)
MUCOUS THREADS URNS QL MICRO: ABNORMAL /LPF
NEUTS SEG # BLD: 9.6 K/UL (ref 1.8–8)
NEUTS SEG NFR BLD: 69 % (ref 32–75)
NITRITE UR QL STRIP.AUTO: NEGATIVE
NRBC # BLD: 0 K/UL (ref 0–0.01)
NRBC BLD-RTO: 0 PER 100 WBC
PH UR STRIP: 6.5 [PH] (ref 5–8)
PLATELET # BLD AUTO: 208 K/UL (ref 150–400)
PMV BLD AUTO: 9 FL (ref 8.9–12.9)
POTASSIUM SERPL-SCNC: 3.4 MMOL/L (ref 3.5–5.1)
PROT SERPL-MCNC: 7.4 G/DL (ref 6.4–8.2)
PROT UR STRIP-MCNC: 30 MG/DL
RBC # BLD AUTO: 4.54 M/UL (ref 3.8–5.2)
RBC #/AREA URNS HPF: ABNORMAL /HPF (ref 0–5)
RBC MORPH BLD: ABNORMAL
SODIUM SERPL-SCNC: 139 MMOL/L (ref 136–145)
SP GR UR REFRACTOMETRY: 1.02 (ref 1–1.03)
TROPONIN I SERPL-MCNC: <0.05 NG/ML
TSH SERPL DL<=0.05 MIU/L-ACNC: 2.12 UIU/ML (ref 0.36–3.74)
UROBILINOGEN UR QL STRIP.AUTO: >8 EU/DL (ref 0.2–1)
WBC # BLD AUTO: 14 K/UL (ref 3.6–11)
WBC URNS QL MICRO: ABNORMAL /HPF (ref 0–4)

## 2021-07-26 PROCEDURE — 81001 URINALYSIS AUTO W/SCOPE: CPT

## 2021-07-26 PROCEDURE — 36415 COLL VENOUS BLD VENIPUNCTURE: CPT

## 2021-07-26 PROCEDURE — 83735 ASSAY OF MAGNESIUM: CPT

## 2021-07-26 PROCEDURE — 93005 ELECTROCARDIOGRAM TRACING: CPT

## 2021-07-26 PROCEDURE — 71045 X-RAY EXAM CHEST 1 VIEW: CPT

## 2021-07-26 PROCEDURE — 99285 EMERGENCY DEPT VISIT HI MDM: CPT

## 2021-07-26 PROCEDURE — 70450 CT HEAD/BRAIN W/O DYE: CPT

## 2021-07-26 PROCEDURE — 85025 COMPLETE CBC W/AUTO DIFF WBC: CPT

## 2021-07-26 PROCEDURE — 80053 COMPREHEN METABOLIC PANEL: CPT

## 2021-07-26 PROCEDURE — 84484 ASSAY OF TROPONIN QUANT: CPT

## 2021-07-26 PROCEDURE — 74011250636 HC RX REV CODE- 250/636: Performed by: EMERGENCY MEDICINE

## 2021-07-26 PROCEDURE — 74011250636 HC RX REV CODE- 250/636: Performed by: FAMILY MEDICINE

## 2021-07-26 PROCEDURE — 84443 ASSAY THYROID STIM HORMONE: CPT

## 2021-07-26 PROCEDURE — 74011250637 HC RX REV CODE- 250/637: Performed by: EMERGENCY MEDICINE

## 2021-07-26 PROCEDURE — 83880 ASSAY OF NATRIURETIC PEPTIDE: CPT

## 2021-07-26 PROCEDURE — 87086 URINE CULTURE/COLONY COUNT: CPT

## 2021-07-26 RX ORDER — HYDROCODONE BITARTRATE AND ACETAMINOPHEN 5; 325 MG/1; MG/1
1 TABLET ORAL
Status: COMPLETED | OUTPATIENT
Start: 2021-07-26 | End: 2021-07-26

## 2021-07-26 RX ORDER — NAPROXEN 250 MG/1
500 TABLET ORAL
Status: COMPLETED | OUTPATIENT
Start: 2021-07-26 | End: 2021-07-26

## 2021-07-26 RX ADMIN — SODIUM CHLORIDE 500 ML: 9 INJECTION, SOLUTION INTRAVENOUS at 20:00

## 2021-07-26 RX ADMIN — SODIUM CHLORIDE 500 ML: 9 INJECTION, SOLUTION INTRAVENOUS at 19:09

## 2021-07-26 RX ADMIN — HYDROCODONE BITARTRATE AND ACETAMINOPHEN 1 TABLET: 5; 325 TABLET ORAL at 18:40

## 2021-07-26 RX ADMIN — NAPROXEN 500 MG: 250 TABLET ORAL at 18:40

## 2021-07-26 NOTE — DISCHARGE INSTRUCTIONS
--Return to the ED if your chest pain changes or if it does not improve with Mylanta or Maalox 30 mg every 4 hours as needed. --Continue with the omeprazole as prescribed. --Follow up with Dr. Jonel Rivera in 2 days as scheduled.

## 2021-07-26 NOTE — ED TRIAGE NOTES
Pt arrived by POV with complaint of chest pain, headache and feeling drained since Saturday. Pt reports the chest pain is worse if she lays on her left side or if she bends over forward, the pain comes and goes.   Pt is awake alert and oriented x 4, pt educated on ER flow

## 2021-07-26 NOTE — ED PROVIDER NOTES
EMERGENCY DEPARTMENT HISTORY AND PHYSICAL EXAM      Date: 7/26/2021  Patient Name: Gustavo Kirk    History of Presenting Illness     Chief Complaint   Patient presents with    Chest Pain       History Provided By: Patient    HPI: Gustavo Kirk, 63 y.o. female with PMHx significant for hypertension, paroxysmal A. fib, fibromyalgia, migraine headaches presents ambulatory to the ED with cc of chest tightness and shortness of breath as well as a headache. She reports a history of chronic migraine headaches. Reports her PCP prescribed meloxicam which she has been taking with mild relief. She states she presents today because she has been having some shortness of breath and left-sided chest pain since last Wednesday. She reports she is unable to sleep on her left side due to pain. She reports shortness of breath with minimal exertion. She denies any sharp or pleuritic pain. Pain is an ache and a pressure. Pain is nonradiating. He does states she feels slightly more short of breath lying flat. She has a history of paroxysmal A. fib. She is not on any anticoagulation. She is aspirin allergic. Recent echo in April of this year with LVEF of 65-70. Denies any cough. She denies any fevers or chills. She denies any nausea, vomiting, diarrhea or abdominal pain. She is seeing Dr. Tao Ospina with cardiology. PMHx: Significant for BURTON, paroxysmal A. fib, hypertension, fibromyalgia, chronic migraine headaches  PSHx: Significant for cataract surgery. Social Hx: Non-smoker. Rare alcohol use. There are no other complaints, changes, or physical findings at this time. PCP: Beni Quintana MD    No current facility-administered medications on file prior to encounter. Current Outpatient Medications on File Prior to Encounter   Medication Sig Dispense Refill    hydroCHLOROthiazide (MICROZIDE) 12.5 mg capsule hydrochlorothiazide 12.5 mg capsule   Take 1 capsule every day by oral route.       FLUoxetine (PROzac) 40 mg capsule TAKE 1 CAPSULE BY MOUTH ONCE DAILY      alendronate (FOSAMAX) 70 mg tablet TAKE ONE TABLET BY MOUTH ONCE A WEEK ON AN EMPTY STOMACH 30 MINUTES BEFORE BREAKFAST WITH WATER. DO NOT LAY DOWN AFTER TAKING      divalproex ER (DEPAKOTE ER) 250 mg ER tablet TAKE 1 TABLET BY MOUTH THREE TIMES DAILY      omeprazole (PRILOSEC) 20 mg capsule TAKE 1 CAPSULE BY MOUTH IN THE MORNING FOR GERD      oxybutynin (DITROPAN) 5 mg tablet oxybutynin chloride 5 mg tablet   Take 1 tablet twice a day by oral route.  cetirizine (ZyrTEC) 10 mg tablet Zyrtec 10 mg tablet   Take 1 tablet every day by oral route.  predniSONE (DELTASONE) 1 mg tablet TAKE 4 TABLETS BY MOUTH DAILY IN THE MORNING FOR 2 WEEKS TAKE 3 TABLETS DAILY FOR 2 WEEKS TAKE 2 TABS DAILY FOR 2 WEEKS TAKE 1 TABLET DAILY      amLODIPine (NORVASC) 5 mg tablet Take 1 Tab by mouth daily. 30 Tab 0    aspirin 81 mg chewable tablet Take 81 mg by mouth.  cetirizine (ZYRTEC) 10 mg tablet Take 10 mg by mouth.       predniSONE (STERAPRED) 5 mg dose pack See administration instruction per 5mg dose pack 21 Tab 0       Past History     Past Medical History:  Past Medical History:   Diagnosis Date    DJD (degenerative joint disease)     Fibromyalgia     Hypertension     Ill-defined condition     cholestrol    Menopause     age 38    Migraine     Paroxysmal atrial fibrillation ([facility] [address] 4/20/2021    Sleep disorder     sleep apnea uses CPAP at night       Past Surgical History:  Past Surgical History:   Procedure Laterality Date    HX CATARACT REMOVAL Right 09/05/2020    HX HEENT      right eye cataract       Family History:  Family History   Problem Relation Age of Onset    Diabetes Mother     Hypertension Sister        Social History:  Social History     Tobacco Use    Smoking status: Never Smoker    Smokeless tobacco: Never Used   Vaping Use    Vaping Use: Never used   Substance Use Topics    Alcohol use: Never     Comment: carlton Kilpatrick Drug use: No       Allergies: Allergies   Allergen Reactions    Ace Inhibitors Angioedema    Aspirin Other (comments)    Penicillins Hives    Sulfa (Sulfonamide Antibiotics) Unable to Obtain         Review of Systems   Review of Systems   Constitutional: Negative for activity change, chills and fever. HENT: Negative for congestion and sore throat. Eyes: Negative for pain and redness. Respiratory: Positive for chest tightness and shortness of breath. Negative for cough. Cardiovascular: Positive for chest pain. Negative for palpitations. Gastrointestinal: Negative for abdominal pain, diarrhea, nausea and vomiting. Genitourinary: Negative for dysuria, frequency and urgency. Musculoskeletal: Negative for back pain and neck pain. Skin: Negative for rash. Neurological: Negative for syncope, light-headedness and headaches. Psychiatric/Behavioral: Negative for confusion. All other systems reviewed and are negative. Physical Exam   Physical Exam  Vitals and nursing note reviewed. Constitutional:       General: She is not in acute distress. Appearance: She is well-developed. She is obese. She is not diaphoretic. HENT:      Head: Normocephalic. Nose: Nose normal.      Mouth/Throat:      Pharynx: No oropharyngeal exudate. Eyes:      General: No scleral icterus. Conjunctiva/sclera: Conjunctivae normal.      Pupils: Pupils are equal, round, and reactive to light. Neck:      Thyroid: No thyromegaly. Vascular: No JVD. Trachea: No tracheal deviation. Cardiovascular:      Rate and Rhythm: Normal rate and regular rhythm. Heart sounds: No murmur heard. No friction rub. No gallop. Pulmonary:      Effort: Pulmonary effort is normal. No respiratory distress. Breath sounds: Normal breath sounds. No stridor. No wheezing or rales. Abdominal:      General: Bowel sounds are normal. There is no distension. Palpations: Abdomen is soft. Tenderness:  There is no abdominal tenderness. There is no guarding or rebound. Musculoskeletal:         General: Normal range of motion. Cervical back: Normal range of motion and neck supple. Lymphadenopathy:      Cervical: No cervical adenopathy. Skin:     General: Skin is warm and dry. Capillary Refill: Capillary refill takes less than 2 seconds. Findings: No erythema or rash. Neurological:      General: No focal deficit present. Mental Status: She is alert and oriented to person, place, and time. Cranial Nerves: No cranial nerve deficit. Motor: No abnormal muscle tone. Coordination: Coordination normal.   Psychiatric:         Behavior: Behavior normal.             Diagnostic Study Results     Labs -     Recent Results (from the past 24 hour(s))   EKG, 12 LEAD, INITIAL    Collection Time: 07/26/21  5:08 PM   Result Value Ref Range    Ventricular Rate 92 BPM    Atrial Rate 92 BPM    P-R Interval 152 ms    QRS Duration 70 ms    Q-T Interval 362 ms    QTC Calculation (Bezet) 447 ms    Calculated P Axis 37 degrees    Calculated R Axis -15 degrees    Calculated T Axis 31 degrees    Diagnosis       Sinus rhythm with premature atrial complexes  Possible Left atrial enlargement  Left ventricular hypertrophy  Abnormal ECG  No previous ECGs available     CBC WITH AUTOMATED DIFF    Collection Time: 07/26/21  5:25 PM   Result Value Ref Range    WBC 14.0 (H) 3.6 - 11.0 K/uL    RBC 4.54 3.80 - 5.20 M/uL    HGB 13.3 11.5 - 16.0 g/dL    HCT 40.7 35.0 - 47.0 %    MCV 89.6 80.0 - 99.0 FL    MCH 29.3 26.0 - 34.0 PG    MCHC 32.7 30.0 - 36.5 g/dL    RDW 14.6 (H) 11.5 - 14.5 %    PLATELET 210 970 - 467 K/uL    MPV 9.0 8.9 - 12.9 FL    NRBC 0.0 0  WBC    ABSOLUTE NRBC 0.00 0.00 - 0.01 K/uL    NEUTROPHILS 69 32 - 75 %    LYMPHOCYTES 14 12 - 49 %    MONOCYTES 17 (H) 5 - 13 %    EOSINOPHILS 0 0 - 7 %    BASOPHILS 0 0 - 1 %    IMMATURE GRANULOCYTES 0 0.0 - 0.5 %    ABS.  NEUTROPHILS 9.6 (H) 1.8 - 8.0 K/UL ABS. LYMPHOCYTES 2.0 0.8 - 3.5 K/UL    ABS. MONOCYTES 2.4 (H) 0.0 - 1.0 K/UL    ABS. EOSINOPHILS 0.0 0.0 - 0.4 K/UL    ABS. BASOPHILS 0.0 0.0 - 0.1 K/UL    ABS. IMM. GRANS. 0.0 0.00 - 0.04 K/UL    DF AUTOMATED      RBC COMMENTS NORMOCYTIC, NORMOCHROMIC     METABOLIC PANEL, COMPREHENSIVE    Collection Time: 07/26/21  5:25 PM   Result Value Ref Range    Sodium 139 136 - 145 mmol/L    Potassium 3.4 (L) 3.5 - 5.1 mmol/L    Chloride 104 97 - 108 mmol/L    CO2 26 21 - 32 mmol/L    Anion gap 9 5 - 15 mmol/L    Glucose 116 (H) 65 - 100 mg/dL    BUN 15 6 - 20 MG/DL    Creatinine 0.79 0.55 - 1.02 MG/DL    BUN/Creatinine ratio 19 12 - 20      GFR est AA >60 >60 ml/min/1.73m2    GFR est non-AA >60 >60 ml/min/1.73m2    Calcium 9.0 8.5 - 10.1 MG/DL    Bilirubin, total 1.2 (H) 0.2 - 1.0 MG/DL    ALT (SGPT) 26 12 - 78 U/L    AST (SGOT) 21 15 - 37 U/L    Alk.  phosphatase 72 45 - 117 U/L    Protein, total 7.4 6.4 - 8.2 g/dL    Albumin 3.3 (L) 3.5 - 5.0 g/dL    Globulin 4.1 (H) 2.0 - 4.0 g/dL    A-G Ratio 0.8 (L) 1.1 - 2.2     TROPONIN I    Collection Time: 07/26/21  5:25 PM   Result Value Ref Range    Troponin-I, Qt. <0.05 <0.05 ng/mL   MAGNESIUM    Collection Time: 07/26/21  5:25 PM   Result Value Ref Range    Magnesium 1.9 1.6 - 2.4 mg/dL   NT-PRO BNP    Collection Time: 07/26/21  5:25 PM   Result Value Ref Range    NT pro-BNP 73 0 - 125 PG/ML   URINALYSIS W/MICROSCOPIC    Collection Time: 07/26/21  6:25 PM   Result Value Ref Range    Color DARK YELLOW      Appearance HAZY (A) CLEAR      Specific gravity 1.025 1.003 - 1.030      pH (UA) 6.5 5.0 - 8.0      Protein 30 (A) NEG mg/dL    Glucose Negative NEG mg/dL    Ketone TRACE (A) NEG mg/dL    Bilirubin Negative NEG      Blood MODERATE (A) NEG      Urobilinogen >8.0 (H) 0.2 - 1.0 EU/dL    Nitrites Negative NEG      Leukocyte Esterase Negative NEG      WBC 0-4 0 - 4 /hpf    RBC 20-50 0 - 5 /hpf    Epithelial cells FEW FEW /lpf    Bacteria Negative NEG /hpf    Mucus 2+ /lpf   TSH 3RD GENERATION    Collection Time: 07/26/21  8:05 PM   Result Value Ref Range    TSH 2.12 0.36 - 3.74 uIU/mL        Recent Results (from the past 12 hour(s))   TSH 3RD GENERATION    Collection Time: 07/26/21  8:05 PM   Result Value Ref Range    TSH 2.12 0.36 - 3.74 uIU/mL       Radiologic Studies -   XR CHEST PORT   Final Result   1. Low lung volumes with bibasilar atelectasis         CT HEAD WO CONT   Final Result         No acute abnormality        CT Results  (Last 48 hours)               07/26/21 1752  CT HEAD WO CONT Final result    Impression:          No acute abnormality       Narrative:  EXAM: CT HEAD WO CONT       INDICATION: headache       COMPARISON: 10/5/2020. CONTRAST: None. TECHNIQUE: Unenhanced CT of the head was performed using 5 mm images. Brain and   bone windows were generated. Coronal and sagittal reformats. CT dose reduction   was achieved through use of a standardized protocol tailored for this   examination and automatic exposure control for dose modulation. FINDINGS:   The ventricles and sulci are normal in size, shape and configuration. . There is   no significant white matter disease. There is no intracranial hemorrhage,   extra-axial collection, or mass effect. The basilar cisterns are open. No CT   evidence of acute infarct. The bone windows demonstrate no abnormalities. The visualized portions of the   paranasal sinuses and mastoid air cells are clear. CXR Results  (Last 48 hours)               07/26/21 1900  XR CHEST PORT Final result    Impression:  1. Low lung volumes with bibasilar atelectasis           Narrative:  INDICATION:  Chest Pain        Exam: Portable chest 1813. Comparison: None. Findings: Cardiomediastinal silhouette is within normal limits. Pulmonary   vasculature is not engorged. Low lung volumes with bibasilar atelectasis. No   pneumothorax or effusion.                    Medical Decision Making   I am the first provider for this patient. I reviewed the vital signs, available nursing notes, past medical history, past surgical history, family history and social history. Vital Signs-Reviewed the patient's vital signs. Patient Vitals for the past 12 hrs:   Pulse Resp BP SpO2   07/26/21 2038 75 19 108/60 96 %   07/26/21 2030 80 17 (!) 101/56 95 %   07/26/21 2006 85 19 117/63 97 %   07/26/21 2000 86 15 91/65 96 %   07/26/21 1930 94 22 116/64 93 %       Pulse Oximetry Analysis - 96% on RA    Cardiac Monitor:   Rate: 75 bpm  Rhythm: Normal Sinus Rhythm        ED EKG interpretation: 5:08 PM  Rhythm: normal sinus rhythm. Rate (approx.): 92; Axis: normal; CT Interval: normal; QRS interval: normal ; ST/T wave: non-specific changes; LVH. This EKG was interpreted by Taylor Hwang MD,ED Provider. Records Reviewed: Nursing Notes and Old Medical Records    Provider Notes (Medical Decision Making):   DDx: ACS, pneumonia, CHF, dysrhythmia. 19:00  No acute ekg changes. CBC only minimally elevated at 14. CMP grossly normal. Negative troponin. CXR pending. VS wnl. Care signed out to Dr. Amanda José, the oncoming ed physician. ED Course:   Initial assessment performed. The patients presenting problems have been discussed, and they are in agreement with the care plan formulated and outlined with them. I have encouraged them to ask questions as they arise throughout their visit. PROGRESS NOTE    1900: Care of patient assumed by Dr. Amanda José from Dr. Zachary Alonso. Pt reevaluated. Pt feeling better. Progress note:    Pt noted to be feeling better in terms of her discharge, but she felt fatigued. A TSH was done and found to be normal. She was then ready for discharge. Updated pt and daughter on all final lab and imaging findings. Will follow up as instructed with the [facility] in 2 days. All questions have been answered, pt voiced understanding and agreement with plan.      Specific return precautions provided as well as instructions to return to the ED should sx worsen at any time. Vital signs stable for discharge. I have also put together some discharge instructions for them that include: 1) educational information regarding their diagnosis, 2) how to care for their diagnosis at home, as well a 3) list of reasons why they would want to return to the ED prior to their follow-up appointment, should their condition change. Written by Cruz Maria MD      Critical Care Time:   0    Disposition:   Home    PLAN:  1. Discharge Medication List as of 7/26/2021  8:54 PM        2. Follow-up Information     Follow up With Specialties Details Why Contact Info    Pravin Palomo MD Internal Medicine Schedule an appointment as soon as possible for a visit in 2 days  [phone]      18 [facility] Emergency Medicine Go in 1 day If symptoms worsen [address], Isreal Johnson MD Internal Medicine In 2 days  [phone] [id number] [phone]          Return to ED if worse     Diagnosis     Clinical Impression:   1. Atypical chest pain              Please note that this dictation was completed with [employer], the computer voice recognition software. Quite often unanticipated grammatical, syntax, homophones, and other interpretive errors are inadvertently transcribed by the computer software. Please disregard these errors. Please excuse any errors that have escaped final proofreading.

## 2021-07-26 NOTE — ED NOTES
Caller would like to discuss an/a Return call for pre op clearance. Writer advised caller of callback within 24-72 hours.    Patient Name: Bryan Dee  Caller Name:Patient  Name of Facility: n/a  Callback Number: [phone]  Best Availability: anytime  Fax Number: n/a  Additional Info: patient states she is having surgery on 10/12 and needs a pre op however patient states she was just in clinic on 9/20 and is wondering if PCP can use the information from that visit instead of her having to come in for another appt. Patient would like a call back as soon as possible. Please advise.  Did you confirm the message with the caller?: yes    Thank you,  Daniela Cote     Portable xray at bedside

## 2021-07-27 LAB
BACTERIA SPEC CULT: NORMAL
CC UR VC: NORMAL
SERVICE CMNT-IMP: NORMAL

## 2021-07-28 ENCOUNTER — HOSPITAL ENCOUNTER (OUTPATIENT)
Dept: LAB | Age: 61
Discharge: HOME | End: 2021-07-28

## 2021-07-28 PROCEDURE — 82150 ASSAY OF AMYLASE: CPT

## 2021-07-28 PROCEDURE — 84460 ALANINE AMINO (ALT) (SGPT): CPT

## 2021-07-28 PROCEDURE — 84450 TRANSFERASE (AST) (SGOT): CPT

## 2021-07-28 PROCEDURE — 82248 BILIRUBIN DIRECT: CPT

## 2021-07-28 PROCEDURE — 85025 COMPLETE CBC W/AUTO DIFF WBC: CPT

## 2021-07-28 PROCEDURE — 85652 RBC SED RATE AUTOMATED: CPT

## 2021-07-28 PROCEDURE — 82247 BILIRUBIN TOTAL: CPT

## 2021-07-28 PROCEDURE — 83690 ASSAY OF LIPASE: CPT

## 2021-07-29 LAB
ALT SERPL-CCNC: 26 U/L (ref 12–78)
AMYLASE SERPL-CCNC: 31 U/L (ref 25–115)
AST SERPL-CCNC: 14 U/L (ref 15–37)
ATRIAL RATE: 92 BPM
BASOPHILS # BLD: 0.1 K/UL (ref 0–0.1)
BASOPHILS NFR BLD: 1 % (ref 0–1)
BILIRUB DIRECT SERPL-MCNC: 0.2 MG/DL (ref 0–0.2)
BILIRUB SERPL-MCNC: 0.9 MG/DL (ref 0.2–1)
CALCULATED P AXIS, ECG09: 37 DEGREES
CALCULATED R AXIS, ECG10: -15 DEGREES
CALCULATED T AXIS, ECG11: 31 DEGREES
DIAGNOSIS, 93000: NORMAL
DIFFERENTIAL METHOD BLD: ABNORMAL
EOSINOPHIL # BLD: 0.2 K/UL (ref 0–0.4)
EOSINOPHIL NFR BLD: 2 % (ref 0–7)
ERYTHROCYTE [DISTWIDTH] IN BLOOD BY AUTOMATED COUNT: 14.9 % (ref 11.5–14.5)
ERYTHROCYTE [SEDIMENTATION RATE] IN BLOOD: 107 MM/HR (ref 0–30)
HCT VFR BLD AUTO: 38.9 % (ref 35–47)
HGB BLD-MCNC: 12.4 G/DL (ref 11.5–16)
IMM GRANULOCYTES # BLD AUTO: 0 K/UL (ref 0–0.04)
IMM GRANULOCYTES NFR BLD AUTO: 0 % (ref 0–0.5)
LIPASE SERPL-CCNC: 40 U/L (ref 73–393)
LYMPHOCYTES # BLD: 2.2 K/UL (ref 0.8–3.5)
LYMPHOCYTES NFR BLD: 24 % (ref 12–49)
MCH RBC QN AUTO: 29.4 PG (ref 26–34)
MCHC RBC AUTO-ENTMCNC: 31.9 G/DL (ref 30–36.5)
MCV RBC AUTO: 92.2 FL (ref 80–99)
MONOCYTES # BLD: 1.1 K/UL (ref 0–1)
MONOCYTES NFR BLD: 12 % (ref 5–13)
NEUTS SEG # BLD: 5.5 K/UL (ref 1.8–8)
NEUTS SEG NFR BLD: 61 % (ref 32–75)
NRBC # BLD: 0 K/UL (ref 0–0.01)
NRBC BLD-RTO: 0 PER 100 WBC
P-R INTERVAL, ECG05: 152 MS
PLATELET # BLD AUTO: 226 K/UL (ref 150–400)
PMV BLD AUTO: 9.8 FL (ref 8.9–12.9)
Q-T INTERVAL, ECG07: 362 MS
QRS DURATION, ECG06: 70 MS
QTC CALCULATION (BEZET), ECG08: 447 MS
RBC # BLD AUTO: 4.22 M/UL (ref 3.8–5.2)
VENTRICULAR RATE, ECG03: 92 BPM
WBC # BLD AUTO: 9.1 K/UL (ref 3.6–11)

## 2021-07-30 ENCOUNTER — HOSPITAL ENCOUNTER (OUTPATIENT)
Dept: ULTRASOUND IMAGING | Age: 61
Discharge: HOME | End: 2021-07-30
Payer: MEDICAID

## 2021-07-30 DIAGNOSIS — K82.9 GALLBLADDER PAIN: ICD-10-CM

## 2021-07-30 PROCEDURE — 76705 ECHO EXAM OF ABDOMEN: CPT

## 2021-08-31 PROCEDURE — 80048 BASIC METABOLIC PNL TOTAL CA: CPT

## 2021-09-01 ENCOUNTER — HOSPITAL ENCOUNTER (OUTPATIENT)
Dept: LAB | Age: 61
Discharge: HOME | End: 2021-09-01

## 2021-09-01 LAB
ANION GAP SERPL CALC-SCNC: 7 MMOL/L (ref 5–15)
BUN SERPL-MCNC: 17 MG/DL (ref 6–20)
BUN/CREAT SERPL: 25 (ref 12–20)
CALCIUM SERPL-MCNC: 9.3 MG/DL (ref 8.5–10.1)
CHLORIDE SERPL-SCNC: 104 MMOL/L (ref 97–108)
CO2 SERPL-SCNC: 31 MMOL/L (ref 21–32)
CREAT SERPL-MCNC: 0.67 MG/DL (ref 0.55–1.02)
GLUCOSE SERPL-MCNC: 72 MG/DL (ref 65–100)
POTASSIUM SERPL-SCNC: 3.5 MMOL/L (ref 3.5–5.1)
SODIUM SERPL-SCNC: 142 MMOL/L (ref 136–145)

## 2021-09-17 ENCOUNTER — OFFICE VISIT (OUTPATIENT)
Dept: SURGERY | Age: 61
End: 2021-09-17
Payer: MEDICAID

## 2021-09-17 VITALS
TEMPERATURE: 98.1 F | SYSTOLIC BLOOD PRESSURE: 128 MMHG | BODY MASS INDEX: 40.66 KG/M2 | HEIGHT: 66 IN | DIASTOLIC BLOOD PRESSURE: 81 MMHG | RESPIRATION RATE: 20 BRPM | HEART RATE: 51 BPM | WEIGHT: 253 LBS | OXYGEN SATURATION: 98 %

## 2021-09-17 DIAGNOSIS — K80.20 CALCULUS OF GALLBLADDER WITHOUT CHOLECYSTITIS WITHOUT OBSTRUCTION: Primary | ICD-10-CM

## 2021-09-17 DIAGNOSIS — E66.01 CLASS 3 SEVERE OBESITY DUE TO EXCESS CALORIES WITHOUT SERIOUS COMORBIDITY WITH BODY MASS INDEX (BMI) OF 40.0 TO 44.9 IN ADULT (HCC): ICD-10-CM

## 2021-09-17 PROCEDURE — 99204 OFFICE O/P NEW MOD 45 MIN: CPT | Performed by: SURGERY

## 2021-09-17 RX ORDER — GABAPENTIN 300 MG/1
100 CAPSULE ORAL
COMMUNITY
Start: 2021-06-30 | End: 2021-09-23

## 2021-09-17 RX ORDER — IBUPROFEN 800 MG/1
TABLET ORAL
COMMUNITY
End: 2021-09-17

## 2021-09-17 RX ORDER — MELOXICAM 7.5 MG/1
7.5 TABLET ORAL DAILY
COMMUNITY
Start: 2021-09-01 | End: 2021-09-17

## 2021-09-17 RX ORDER — METOPROLOL TARTRATE 25 MG/1
TABLET, FILM COATED ORAL
COMMUNITY
Start: 2021-09-01

## 2021-09-17 RX ORDER — ALBUTEROL SULFATE 90 UG/1
2 AEROSOL, METERED RESPIRATORY (INHALATION)
COMMUNITY

## 2021-09-17 NOTE — PROGRESS NOTES
Surgery History and Physical    Subjective:      Bobbi Vuong is a 63 y.o. female who presents for evaluation of gallbladder issues. She reports intermittent pain. She went to the ER at the end July for gallstones. She still reports intermittent pain still while eating. No fevers. Having bowel function. RUQ US: IMPRESSION   Hepatomegaly   Cholelithiasis with stone in gallbladder neck and mildly dilated CBD  Nonobstructing right renal calculus    Past Medical History:   Diagnosis Date    DJD (degenerative joint disease)     Fibromyalgia     Hypertension     Ill-defined condition     cholestrol    Menopause     age 38    Migraine     Paroxysmal atrial fibrillation ([address] 4/20/2021    Sleep disorder     sleep apnea uses CPAP at night     Past Surgical History:   Procedure Laterality Date    HX CATARACT REMOVAL Right 09/05/2020    HX HEENT      right eye cataract      Family History   Problem Relation Age of Onset    Diabetes Mother     Hypertension Sister      Social History     Tobacco Use    Smoking status: Never Smoker    Smokeless tobacco: Never Used   Substance Use Topics    Alcohol use: Never     Comment: socially      Prior to Admission medications    Medication Sig Start Date End Date Taking? Authorizing Provider   FLUoxetine (PROzac) 40 mg capsule TAKE 1 CAPSULE BY MOUTH ONCE DAILY 9/11/20  Yes Other, MD Nayana   alendronate (FOSAMAX) 70 mg tablet TAKE ONE TABLET BY MOUTH ONCE A WEEK ON AN EMPTY STOMACH 30 MINUTES BEFORE BREAKFAST WITH WATER. DO NOT LAY DOWN AFTER TAKING 9/16/20  Yes Other, MD Nayana   divalproex ER (DEPAKOTE ER) 250 mg ER tablet TAKE 1 TABLET BY MOUTH THREE TIMES DAILY 9/11/20  Yes Other, MD Nayana   omeprazole (PRILOSEC) 20 mg capsule TAKE 1 CAPSULE BY MOUTH IN THE MORNING FOR GERD 9/16/20  Yes Other, MD Nayana   oxybutynin (DITROPAN) 5 mg tablet oxybutynin chloride 5 mg tablet   Take 1 tablet twice a day by oral route.    Yes Other, MD Nayana   amLODIPine (NORVASC) 5 mg tablet Take 1 Tab by mouth daily. 9/26/20  Yes Qi Gunn DO   cetirizine (ZYRTEC) 10 mg tablet Take 10 mg by mouth. Yes Aparna, MD Nayana   hydroCHLOROthiazide (MICROZIDE) 12.5 mg capsule hydrochlorothiazide 12.5 mg capsule   Take 1 capsule every day by oral route. Patient not taking: Reported on 9/17/2021    OtherNayana MD   cetirizine (ZyrTEC) 10 mg tablet Zyrtec 10 mg tablet   Take 1 tablet every day by oral route. Other, MD Nayana   predniSONE (DELTASONE) 1 mg tablet TAKE 4 TABLETS BY MOUTH DAILY IN THE MORNING FOR 2 WEEKS TAKE 3 TABLETS DAILY FOR 2 WEEKS TAKE 2 TABS DAILY FOR 2 WEEKS TAKE 1 TABLET DAILY  Patient not taking: Reported on 9/17/2021 9/18/20   Nayana Braun MD   aspirin 81 mg chewable tablet Take 81 mg by mouth. Patient not taking: Reported on 9/17/2021    Nayana Braun MD   predniSONE (STERAPRED) 5 mg dose pack See administration instruction per 5mg dose pack  Patient not taking: Reported on 9/17/2021 8/25/17   Juarez Beltran, PA      Allergies   Allergen Reactions    Ace Inhibitors Angioedema    Aspirin Other (comments)    Penicillins Hives    Sulfa (Sulfonamide Antibiotics) Unable to Obtain       Review of Systems:  A comprehensive review of systems was negative except for that written in the History of Present Illness. Objective:     Visit Vitals  /81 (BP 1 Location: Right upper arm, BP Patient Position: Sitting, BP Cuff Size: Adult)   Pulse (!) 51   Temp 98.1 °F (36.7 °C)   Resp 20   Ht 5' 6\" (1.676 m)   Wt 114.8 kg (253 lb)   SpO2 98%   BMI 40.84 kg/m²       Physical Exam:  Physical Exam:  General:  Alert, cooperative, no distress, appears stated age. Walks with walker   Eyes:  Conjunctivae/corneas clear. Ears:  Normal external ear canals both ears. Nose: Nares normal. Septum midline. Mouth/Throat: Lips, mucosa, and tongue normal. Teeth and gums normal.   Neck: Supple, symmetrical, trachea midline   Back:   Symmetric, no curvature.  ROM normal.    Lungs:   Clear to auscultation bilaterally. Heart:  Regular rate and rhythm   Abdomen:   Soft, non-tender. Bowel sounds normal. No masses,  No organomegaly. Extremities: No edema   Skin: Skin color, texture, turgor normal. No rashes or lesions         Assessment:     63year old female with symptomatic cholelithiasis    Plan:     I Recommend we proceed with Laparoscopic Cholecystectomy with Intraoperative Cholangiogram.  Risks, Benefits, and Alternatives of the procedure were discussed with the patient and family including:  the risk of the anesthesia, bleeding, infection, injury to the intestines, injury to the ducts, and the lack of symptomatic improvement. The patient is agreeable to proceed.   -will stay for 23 hour obs

## 2021-09-17 NOTE — PROGRESS NOTES
Identified pt with two pt identifiers(name and ). Reviewed record in preparation for visit and have obtained necessary documentation. All patient medications has been reviewed. Chief Complaint   Patient presents with    Gallbladder Attack     seen at the request of Violetta cao gallbladder        Health Maintenance Due   Topic    Hepatitis C Screening     COVID-19 Vaccine (1)    DTaP/Tdap/Td series (1 - Tdap)    Cervical cancer screen     Colorectal Cancer Screening Combo     Shingrix Vaccine Age 50> (1 of 2)    Flu Vaccine (1)       Vitals:    21 1357   BP: 128/81   Pulse: (!) 51   Resp: 20   Temp: 98.1 °F (36.7 °C)   SpO2: 98%   Weight: 114.8 kg (253 lb)   Height: 5' 6\" (1.676 m)   PainSc:   0 - No pain       4. Have you been to the ER, urgent care clinic since your last visit? Hospitalized since your last visit? new patient     5. Have you seen or consulted any other health care providers outside of the [address] [place] [address] since your last visit? Include any pap smears or colon screening. new patient       Patient is accompanied by self I have received verbal consent from Silverio Yip to discuss any/all medical information while they are present in the room.

## 2021-09-17 NOTE — LETTER
9/17/2021    Patient: Marc Spear   YOB: 1960   Date of Visit: 9/17/2021     MD Joe Choins [phone]  Via Fax: [phone]    Dear Adam Vargas MD,      Thank you for referring Ms. Marc Spear to [address] for evaluation. My notes for this consultation are attached. If you have questions, please do not hesitate to call me. I look forward to following your patient along with you.       Sincerely,    Martinez Mooney MD

## 2021-09-17 NOTE — H&P (VIEW-ONLY)
Surgery History and Physical    Subjective:      Renato Padgett is a 63 y.o. female who presents for evaluation of gallbladder issues. She reports intermittent pain. She went to the ER at the end July for gallstones. She still reports intermittent pain still while eating. No fevers. Having bowel function. RUQ US: IMPRESSION   Hepatomegaly   Cholelithiasis with stone in gallbladder neck and mildly dilated CBD  Nonobstructing right renal calculus    Past Medical History:   Diagnosis Date    DJD (degenerative joint disease)     Fibromyalgia     Hypertension     Ill-defined condition     cholestrol    Menopause     age 38    Migraine     Paroxysmal atrial fibrillation ([address] 4/20/2021    Sleep disorder     sleep apnea uses CPAP at night     Past Surgical History:   Procedure Laterality Date    HX CATARACT REMOVAL Right 09/05/2020    HX HEENT      right eye cataract      Family History   Problem Relation Age of Onset    Diabetes Mother     Hypertension Sister      Social History     Tobacco Use    Smoking status: Never Smoker    Smokeless tobacco: Never Used   Substance Use Topics    Alcohol use: Never     Comment: socially      Prior to Admission medications    Medication Sig Start Date End Date Taking? Authorizing Provider   FLUoxetine (PROzac) 40 mg capsule TAKE 1 CAPSULE BY MOUTH ONCE DAILY 9/11/20  Yes Other, MD Nayana   alendronate (FOSAMAX) 70 mg tablet TAKE ONE TABLET BY MOUTH ONCE A WEEK ON AN EMPTY STOMACH 30 MINUTES BEFORE BREAKFAST WITH WATER. DO NOT LAY DOWN AFTER TAKING 9/16/20  Yes Other, MD Nayana   divalproex ER (DEPAKOTE ER) 250 mg ER tablet TAKE 1 TABLET BY MOUTH THREE TIMES DAILY 9/11/20  Yes Other, MD Nayana   omeprazole (PRILOSEC) 20 mg capsule TAKE 1 CAPSULE BY MOUTH IN THE MORNING FOR GERD 9/16/20  Yes Other, MD Nayana   oxybutynin (DITROPAN) 5 mg tablet oxybutynin chloride 5 mg tablet   Take 1 tablet twice a day by oral route.    Yes Other, MD Nayana   amLODIPine (NORVASC) 5 mg tablet Take 1 Tab by mouth daily. 9/26/20  Yes Qi Gunn,    cetirizine (ZYRTEC) 10 mg tablet Take 10 mg by mouth. Yes Aparna, MD Nayana   hydroCHLOROthiazide (MICROZIDE) 12.5 mg capsule hydrochlorothiazide 12.5 mg capsule   Take 1 capsule every day by oral route. Patient not taking: Reported on 9/17/2021    OtherNayana MD   cetirizine (ZyrTEC) 10 mg tablet Zyrtec 10 mg tablet   Take 1 tablet every day by oral route. Other, MD Nayana   predniSONE (DELTASONE) 1 mg tablet TAKE 4 TABLETS BY MOUTH DAILY IN THE MORNING FOR 2 WEEKS TAKE 3 TABLETS DAILY FOR 2 WEEKS TAKE 2 TABS DAILY FOR 2 WEEKS TAKE 1 TABLET DAILY  Patient not taking: Reported on 9/17/2021 9/18/20   Nayana Braun MD   aspirin 81 mg chewable tablet Take 81 mg by mouth. Patient not taking: Reported on 9/17/2021    Nayana Braun MD   predniSONE (STERAPRED) 5 mg dose pack See administration instruction per 5mg dose pack  Patient not taking: Reported on 9/17/2021 8/25/17   MIROSLAVA Gupta      Allergies   Allergen Reactions    Ace Inhibitors Angioedema    Aspirin Other (comments)    Penicillins Hives    Sulfa (Sulfonamide Antibiotics) Unable to Obtain       Review of Systems:  A comprehensive review of systems was negative except for that written in the History of Present Illness. Objective:     Visit Vitals  /81 (BP 1 Location: Right upper arm, BP Patient Position: Sitting, BP Cuff Size: Adult)   Pulse (!) 51   Temp 98.1 °F (36.7 °C)   Resp 20   Ht 5' 6\" (1.676 m)   Wt 114.8 kg (253 lb)   SpO2 98%   BMI 40.84 kg/m²       Physical Exam:  Physical Exam:  General:  Alert, cooperative, no distress, appears stated age. Walks with walker   Eyes:  Conjunctivae/corneas clear. Ears:  Normal external ear canals both ears. Nose: Nares normal. Septum midline. Mouth/Throat: Lips, mucosa, and tongue normal. Teeth and gums normal.   Neck: Supple, symmetrical, trachea midline   Back:   Symmetric, no curvature.  ROM normal.    Lungs:   Clear to auscultation bilaterally. Heart:  Regular rate and rhythm   Abdomen:   Soft, non-tender. Bowel sounds normal. No masses,  No organomegaly. Extremities: No edema   Skin: Skin color, texture, turgor normal. No rashes or lesions         Assessment:     63year old female with symptomatic cholelithiasis    Plan:     I Recommend we proceed with Laparoscopic Cholecystectomy with Intraoperative Cholangiogram.  Risks, Benefits, and Alternatives of the procedure were discussed with the patient and family including:  the risk of the anesthesia, bleeding, infection, injury to the intestines, injury to the ducts, and the lack of symptomatic improvement. The patient is agreeable to proceed.   -will stay for 23 hour obs

## 2021-09-23 RX ORDER — GABAPENTIN 300 MG/1
300 CAPSULE ORAL
COMMUNITY

## 2021-09-23 RX ORDER — PHENOL/SODIUM PHENOLATE
20 AEROSOL, SPRAY (ML) MUCOUS MEMBRANE DAILY
COMMUNITY

## 2021-09-23 RX ORDER — DIVALPROEX SODIUM 250 MG/1
250 TABLET, DELAYED RELEASE ORAL
COMMUNITY

## 2021-09-23 RX ORDER — MELOXICAM 7.5 MG/1
7.5 TABLET ORAL DAILY
COMMUNITY

## 2021-09-23 RX ORDER — TOPIRAMATE 50 MG/1
50 TABLET, FILM COATED ORAL
COMMUNITY

## 2021-09-23 RX ORDER — ATORVASTATIN CALCIUM 20 MG/1
20 TABLET, FILM COATED ORAL DAILY
COMMUNITY

## 2021-09-23 NOTE — PERIOP NOTES
[facility]  Preoperative Instructions        COVID Test 9/27/21 [facility]    Surgery Date 10/1/21          Time of Arrival 0930 am   Contact # [phone]    1. On the day of your surgery, please report to the Surgical Services Registration Desk and sign in at your designated time. The Surgery Center is located to the right of the Emergency Room. 2. You must have someone with you to drive you home. You should not drive a car for 24 hours following surgery. Please make arrangements for a friend or family member to stay with you for the first 24 hours after your surgery. 3. Do not have anything to eat or drink (including water, gum, mints, coffee, juice) after midnight ? 9/30/21  . ? This may not apply to medications prescribed by your physician. ?(Please note below the special instructions with medications to take the morning of your procedure.)    4. We recommend you do not drink any alcoholic beverages for 24 hours before and after your surgery. 5. Contact your surgeons office for instructions on the following medications: non-steroidal anti-inflammatory drugs (i.e. Advil, Aleve), vitamins, and supplements. (Some surgeons will want you to stop these medications prior to surgery and others may allow you to take them)  **If you are currently taking Plavix, Coumadin, Aspirin and/or other blood-thinning agents, contact your surgeon for instructions. ** Your surgeon will partner with the physician prescribing these medications to determine if it is safe to stop or if you need to continue taking. Please do not stop taking these medications without instructions from your surgeon    6. Wear comfortable clothes. Wear glasses instead of contacts. Do not bring any money or jewelry. Please bring picture ID, insurance card, and any prearranged co-payment or hospital payment. Do not wear make-up, particularly mascara the morning of your surgery.   Do not wear nail polish, particularly if you are having foot /hand surgery. Wear your hair loose or down, no ponytails, buns, lit pins or clips. All body piercings must be removed. Please shower with antibacterial soap for three consecutive days before and on the morning of surgery, but do not apply any lotions, powders or deodorants after the shower on the day of surgery. Please use a fresh towels after each shower. Please sleep in clean clothes and change bed linens the night before surgery. Please do not shave for 48 hours prior to surgery. Shaving of the face is acceptable. 7. You should understand that if you do not follow these instructions your surgery may be cancelled. If your physical condition changes (I.e. fever, cold or flu) please contact your surgeon as soon as possible. 8. It is important that you be on time. If a situation occurs where you may be late, please call [phone] (OR Holding Area). 9. If you have any questions and or problems, please call [phone] (Pre-admission Testing). 10. Your surgery time may be subject to change. You will receive a phone call the evening prior if your time changes. 11.  If having outpatient surgery, you must have someone to drive you here, stay with you during the duration of your stay, and to drive you home at time of discharge. Special Instructions:     TAKE ALL MEDICATIONS DAY OF SURGERY EXCEPT:  None      I understand a pre-operative phone call will be made to verify my surgery time. In the event that I am not available, I give permission for a message to be left on my answering service and/or with another person?   yes         ___________________      __________   _________    (Signature of Patient)             (Witness)                (Date and Time)

## 2021-09-27 ENCOUNTER — HOSPITAL ENCOUNTER (OUTPATIENT)
Dept: PREADMISSION TESTING | Age: 61
Discharge: HOME | End: 2021-09-27
Payer: MEDICAID

## 2021-09-27 PROCEDURE — U0005 INFEC AGEN DETEC AMPLI PROBE: HCPCS

## 2021-09-28 LAB
SARS-COV-2, XPLCVT: NOT DETECTED
SOURCE, COVRS: NORMAL

## 2021-09-30 ENCOUNTER — TELEPHONE (OUTPATIENT)
Dept: SURGERY | Age: 61
End: 2021-09-30

## 2021-10-01 ENCOUNTER — APPOINTMENT (OUTPATIENT)
Dept: GENERAL RADIOLOGY | Age: 61
End: 2021-10-01
Attending: SURGERY
Payer: MEDICAID

## 2021-10-01 ENCOUNTER — ANESTHESIA EVENT (OUTPATIENT)
Dept: SURGERY | Age: 61
End: 2021-10-01
Payer: MEDICAID

## 2021-10-01 ENCOUNTER — ANESTHESIA (OUTPATIENT)
Dept: SURGERY | Age: 61
End: 2021-10-01
Payer: MEDICAID

## 2021-10-01 ENCOUNTER — HOSPITAL ENCOUNTER (OUTPATIENT)
Age: 61
Discharge: HOME | End: 2021-10-02
Attending: SURGERY | Admitting: SURGERY
Payer: MEDICAID

## 2021-10-01 DIAGNOSIS — K80.20 CALCULUS OF GALLBLADDER WITHOUT CHOLECYSTITIS WITHOUT OBSTRUCTION: Primary | ICD-10-CM

## 2021-10-01 DIAGNOSIS — E66.01 CLASS 3 SEVERE OBESITY DUE TO EXCESS CALORIES WITHOUT SERIOUS COMORBIDITY WITH BODY MASS INDEX (BMI) OF 40.0 TO 44.9 IN ADULT (HCC): ICD-10-CM

## 2021-10-01 PROCEDURE — 2709999900 HC NON-CHARGEABLE SUPPLY

## 2021-10-01 PROCEDURE — 77030004818 HC CATH CHOLGM TELE -B: Performed by: SURGERY

## 2021-10-01 PROCEDURE — 77030008603 HC TRCR ENDOSC EPATH J&J -C: Performed by: SURGERY

## 2021-10-01 PROCEDURE — 74300 X-RAY BILE DUCTS/PANCREAS: CPT | Performed by: SURGERY

## 2021-10-01 PROCEDURE — 74011000636 HC RX REV CODE- 636: Performed by: SURGERY

## 2021-10-01 PROCEDURE — 74011000250 HC RX REV CODE- 250: Performed by: SURGERY

## 2021-10-01 PROCEDURE — 77030009848 HC PASSR SUT SET COOP -C: Performed by: SURGERY

## 2021-10-01 PROCEDURE — 77030002996 HC SUT SLK J&J -A: Performed by: SURGERY

## 2021-10-01 PROCEDURE — 77030016151 HC PROTCTR LNS DFOG COVD -B: Performed by: SURGERY

## 2021-10-01 PROCEDURE — 74011250636 HC RX REV CODE- 250/636: Performed by: NURSE ANESTHETIST, CERTIFIED REGISTERED

## 2021-10-01 PROCEDURE — 77030011280 HC ELECTRD MPLR J&J -B: Performed by: SURGERY

## 2021-10-01 PROCEDURE — 77030002933 HC SUT MCRYL J&J -A: Performed by: SURGERY

## 2021-10-01 PROCEDURE — 77030012770 HC TRCR OPT FX AMR -B: Performed by: SURGERY

## 2021-10-01 PROCEDURE — 47563 LAPARO CHOLECYSTECTOMY/GRAPH: CPT | Performed by: SURGERY

## 2021-10-01 PROCEDURE — 77030031139 HC SUT VCRL2 J&J -A: Performed by: SURGERY

## 2021-10-01 PROCEDURE — 77030026438 HC STYL ET INTUB CARD -A: Performed by: STUDENT IN AN ORGANIZED HEALTH CARE EDUCATION/TRAINING PROGRAM

## 2021-10-01 PROCEDURE — 74011000250 HC RX REV CODE- 250: Performed by: NURSE ANESTHETIST, CERTIFIED REGISTERED

## 2021-10-01 PROCEDURE — 88304 TISSUE EXAM BY PATHOLOGIST: CPT

## 2021-10-01 PROCEDURE — 74011250636 HC RX REV CODE- 250/636: Performed by: ANESTHESIOLOGY

## 2021-10-01 PROCEDURE — 77030008684 HC TU ET CUF COVD -B: Performed by: STUDENT IN AN ORGANIZED HEALTH CARE EDUCATION/TRAINING PROGRAM

## 2021-10-01 PROCEDURE — 77030012961 HC IRR KT CYSTO/TUR ICUM -A: Performed by: SURGERY

## 2021-10-01 PROCEDURE — 74011250637 HC RX REV CODE- 250/637: Performed by: SURGERY

## 2021-10-01 PROCEDURE — 77030010513 HC APPL CLP LIG J&J -C: Performed by: SURGERY

## 2021-10-01 PROCEDURE — 77030009932 HC PRB FT CTRL J&J -B: Performed by: SURGERY

## 2021-10-01 PROCEDURE — 76060000033 HC ANESTHESIA 1 TO 1.5 HR: Performed by: SURGERY

## 2021-10-01 PROCEDURE — 74011250636 HC RX REV CODE- 250/636: Performed by: SURGERY

## 2021-10-01 PROCEDURE — 74011250636 HC RX REV CODE- 250/636

## 2021-10-01 PROCEDURE — 77030021678 HC GLIDESCP STAT DISP VERT -B: Performed by: STUDENT IN AN ORGANIZED HEALTH CARE EDUCATION/TRAINING PROGRAM

## 2021-10-01 PROCEDURE — 76210000002 HC OR PH I REC 3 TO 3.5 HR: Performed by: SURGERY

## 2021-10-01 PROCEDURE — 77030040361 HC SLV COMPR DVT MDII -B: Performed by: SURGERY

## 2021-10-01 PROCEDURE — 74300 X-RAY BILE DUCTS/PANCREAS: CPT

## 2021-10-01 PROCEDURE — 2709999900 HC NON-CHARGEABLE SUPPLY: Performed by: SURGERY

## 2021-10-01 PROCEDURE — 76010000149 HC OR TIME 1 TO 1.5 HR: Performed by: SURGERY

## 2021-10-01 RX ORDER — SODIUM CHLORIDE 0.9 % (FLUSH) 0.9 %
5-40 SYRINGE (ML) INJECTION EVERY 8 HOURS
Status: DISCONTINUED | OUTPATIENT
Start: 2021-10-01 | End: 2021-10-01 | Stop reason: HOSPADM

## 2021-10-01 RX ORDER — MIDAZOLAM HYDROCHLORIDE 1 MG/ML
INJECTION, SOLUTION INTRAMUSCULAR; INTRAVENOUS AS NEEDED
Status: DISCONTINUED | OUTPATIENT
Start: 2021-10-01 | End: 2021-10-01 | Stop reason: HOSPADM

## 2021-10-01 RX ORDER — BUPIVACAINE HYDROCHLORIDE 5 MG/ML
INJECTION, SOLUTION EPIDURAL; INTRACAUDAL AS NEEDED
Status: DISCONTINUED | OUTPATIENT
Start: 2021-10-01 | End: 2021-10-01 | Stop reason: HOSPADM

## 2021-10-01 RX ORDER — VANCOMYCIN/0.9 % SOD CHLORIDE 1.5G/250ML
1500 PLASTIC BAG, INJECTION (ML) INTRAVENOUS ONCE
Status: COMPLETED | OUTPATIENT
Start: 2021-10-01 | End: 2021-10-01

## 2021-10-01 RX ORDER — SODIUM CHLORIDE 0.9 % (FLUSH) 0.9 %
5-40 SYRINGE (ML) INJECTION EVERY 8 HOURS
Status: DISCONTINUED | OUTPATIENT
Start: 2021-10-01 | End: 2021-10-03 | Stop reason: HOSPADM

## 2021-10-01 RX ORDER — ONDANSETRON 2 MG/ML
INJECTION INTRAMUSCULAR; INTRAVENOUS AS NEEDED
Status: DISCONTINUED | OUTPATIENT
Start: 2021-10-01 | End: 2021-10-01 | Stop reason: HOSPADM

## 2021-10-01 RX ORDER — OXYCODONE HYDROCHLORIDE 5 MG/1
5 TABLET ORAL
Qty: 15 TABLET | Refills: 0 | Status: SHIPPED | OUTPATIENT
Start: 2021-10-01 | End: 2021-10-04

## 2021-10-01 RX ORDER — FENTANYL CITRATE 50 UG/ML
INJECTION, SOLUTION INTRAMUSCULAR; INTRAVENOUS AS NEEDED
Status: DISCONTINUED | OUTPATIENT
Start: 2021-10-01 | End: 2021-10-01 | Stop reason: HOSPADM

## 2021-10-01 RX ORDER — FENTANYL CITRATE 50 UG/ML
25 INJECTION, SOLUTION INTRAMUSCULAR; INTRAVENOUS
Status: COMPLETED | OUTPATIENT
Start: 2021-10-01 | End: 2021-10-01

## 2021-10-01 RX ORDER — SUCCINYLCHOLINE CHLORIDE 20 MG/ML
INJECTION INTRAMUSCULAR; INTRAVENOUS AS NEEDED
Status: DISCONTINUED | OUTPATIENT
Start: 2021-10-01 | End: 2021-10-01 | Stop reason: HOSPADM

## 2021-10-01 RX ORDER — CETIRIZINE HCL 10 MG
10 TABLET ORAL DAILY
Status: DISCONTINUED | OUTPATIENT
Start: 2021-10-02 | End: 2021-10-03 | Stop reason: HOSPADM

## 2021-10-01 RX ORDER — DIPHENHYDRAMINE HYDROCHLORIDE 50 MG/ML
12.5 INJECTION, SOLUTION INTRAMUSCULAR; INTRAVENOUS
Status: ACTIVE | OUTPATIENT
Start: 2021-10-01 | End: 2021-10-02

## 2021-10-01 RX ORDER — OXYBUTYNIN CHLORIDE 5 MG/1
5 TABLET ORAL ONCE
Status: COMPLETED | OUTPATIENT
Start: 2021-10-01 | End: 2021-10-01

## 2021-10-01 RX ORDER — MELOXICAM 7.5 MG/1
7.5 TABLET ORAL DAILY
Status: DISCONTINUED | OUTPATIENT
Start: 2021-10-02 | End: 2021-10-03 | Stop reason: HOSPADM

## 2021-10-01 RX ORDER — DIVALPROEX SODIUM 250 MG/1
250 TABLET, DELAYED RELEASE ORAL 3 TIMES DAILY
Status: DISCONTINUED | OUTPATIENT
Start: 2021-10-01 | End: 2021-10-03 | Stop reason: HOSPADM

## 2021-10-01 RX ORDER — NALOXONE HYDROCHLORIDE 0.4 MG/ML
0.4 INJECTION, SOLUTION INTRAMUSCULAR; INTRAVENOUS; SUBCUTANEOUS AS NEEDED
Status: DISCONTINUED | OUTPATIENT
Start: 2021-10-01 | End: 2021-10-03 | Stop reason: HOSPADM

## 2021-10-01 RX ORDER — ACETAMINOPHEN 325 MG/1
650 TABLET ORAL
Status: DISCONTINUED | OUTPATIENT
Start: 2021-10-01 | End: 2021-10-03 | Stop reason: HOSPADM

## 2021-10-01 RX ORDER — ENOXAPARIN SODIUM 100 MG/ML
40 INJECTION SUBCUTANEOUS EVERY 24 HOURS
Status: DISCONTINUED | OUTPATIENT
Start: 2021-10-01 | End: 2021-10-03 | Stop reason: HOSPADM

## 2021-10-01 RX ORDER — ONDANSETRON 2 MG/ML
4 INJECTION INTRAMUSCULAR; INTRAVENOUS AS NEEDED
Status: DISCONTINUED | OUTPATIENT
Start: 2021-10-01 | End: 2021-10-01 | Stop reason: HOSPADM

## 2021-10-01 RX ORDER — AMLODIPINE BESYLATE 5 MG/1
5 TABLET ORAL DAILY
Status: DISCONTINUED | OUTPATIENT
Start: 2021-10-02 | End: 2021-10-03 | Stop reason: HOSPADM

## 2021-10-01 RX ORDER — SODIUM CHLORIDE 0.9 % (FLUSH) 0.9 %
5-40 SYRINGE (ML) INJECTION AS NEEDED
Status: DISCONTINUED | OUTPATIENT
Start: 2021-10-01 | End: 2021-10-01 | Stop reason: HOSPADM

## 2021-10-01 RX ORDER — METOPROLOL TARTRATE 25 MG/1
25 TABLET, FILM COATED ORAL 2 TIMES DAILY
Status: DISCONTINUED | OUTPATIENT
Start: 2021-10-01 | End: 2021-10-03 | Stop reason: HOSPADM

## 2021-10-01 RX ORDER — AMOXICILLIN 250 MG
1 CAPSULE ORAL DAILY
Status: DISCONTINUED | OUTPATIENT
Start: 2021-10-02 | End: 2021-10-03 | Stop reason: HOSPADM

## 2021-10-01 RX ORDER — ATORVASTATIN CALCIUM 20 MG/1
20 TABLET, FILM COATED ORAL DAILY
Status: DISCONTINUED | OUTPATIENT
Start: 2021-10-02 | End: 2021-10-03 | Stop reason: HOSPADM

## 2021-10-01 RX ORDER — PANTOPRAZOLE SODIUM 40 MG/1
40 TABLET, DELAYED RELEASE ORAL
Status: DISCONTINUED | OUTPATIENT
Start: 2021-10-02 | End: 2021-10-03 | Stop reason: HOSPADM

## 2021-10-01 RX ORDER — ONDANSETRON 2 MG/ML
4 INJECTION INTRAMUSCULAR; INTRAVENOUS
Status: DISCONTINUED | OUTPATIENT
Start: 2021-10-01 | End: 2021-10-03 | Stop reason: HOSPADM

## 2021-10-01 RX ORDER — SODIUM CHLORIDE, SODIUM LACTATE, POTASSIUM CHLORIDE, CALCIUM CHLORIDE 600; 310; 30; 20 MG/100ML; MG/100ML; MG/100ML; MG/100ML
50 INJECTION, SOLUTION INTRAVENOUS CONTINUOUS
Status: DISCONTINUED | OUTPATIENT
Start: 2021-10-01 | End: 2021-10-03 | Stop reason: HOSPADM

## 2021-10-01 RX ORDER — POLYETHYLENE GLYCOL 3350 17 G/17G
17 POWDER, FOR SOLUTION ORAL DAILY
Status: DISCONTINUED | OUTPATIENT
Start: 2021-10-02 | End: 2021-10-03 | Stop reason: HOSPADM

## 2021-10-01 RX ORDER — SODIUM CHLORIDE, SODIUM LACTATE, POTASSIUM CHLORIDE, CALCIUM CHLORIDE 600; 310; 30; 20 MG/100ML; MG/100ML; MG/100ML; MG/100ML
25 INJECTION, SOLUTION INTRAVENOUS CONTINUOUS
Status: DISCONTINUED | OUTPATIENT
Start: 2021-10-01 | End: 2021-10-01 | Stop reason: HOSPADM

## 2021-10-01 RX ORDER — SODIUM CHLORIDE 0.9 % (FLUSH) 0.9 %
5-40 SYRINGE (ML) INJECTION AS NEEDED
Status: DISCONTINUED | OUTPATIENT
Start: 2021-10-01 | End: 2021-10-03 | Stop reason: HOSPADM

## 2021-10-01 RX ORDER — DEXAMETHASONE SODIUM PHOSPHATE 4 MG/ML
INJECTION, SOLUTION INTRA-ARTICULAR; INTRALESIONAL; INTRAMUSCULAR; INTRAVENOUS; SOFT TISSUE AS NEEDED
Status: DISCONTINUED | OUTPATIENT
Start: 2021-10-01 | End: 2021-10-01 | Stop reason: HOSPADM

## 2021-10-01 RX ORDER — EPHEDRINE SULFATE/0.9% NACL/PF 50 MG/5 ML
SYRINGE (ML) INTRAVENOUS AS NEEDED
Status: DISCONTINUED | OUTPATIENT
Start: 2021-10-01 | End: 2021-10-01 | Stop reason: HOSPADM

## 2021-10-01 RX ORDER — LIDOCAINE HYDROCHLORIDE 20 MG/ML
INJECTION, SOLUTION EPIDURAL; INFILTRATION; INTRACAUDAL; PERINEURAL AS NEEDED
Status: DISCONTINUED | OUTPATIENT
Start: 2021-10-01 | End: 2021-10-01 | Stop reason: HOSPADM

## 2021-10-01 RX ORDER — ROCURONIUM BROMIDE 10 MG/ML
INJECTION, SOLUTION INTRAVENOUS AS NEEDED
Status: DISCONTINUED | OUTPATIENT
Start: 2021-10-01 | End: 2021-10-01 | Stop reason: HOSPADM

## 2021-10-01 RX ORDER — HYDROMORPHONE HYDROCHLORIDE 2 MG/ML
INJECTION, SOLUTION INTRAMUSCULAR; INTRAVENOUS; SUBCUTANEOUS AS NEEDED
Status: DISCONTINUED | OUTPATIENT
Start: 2021-10-01 | End: 2021-10-01 | Stop reason: HOSPADM

## 2021-10-01 RX ORDER — PROPOFOL 10 MG/ML
INJECTION, EMULSION INTRAVENOUS AS NEEDED
Status: DISCONTINUED | OUTPATIENT
Start: 2021-10-01 | End: 2021-10-01 | Stop reason: HOSPADM

## 2021-10-01 RX ORDER — OXYBUTYNIN CHLORIDE 5 MG/1
5 TABLET ORAL 2 TIMES DAILY
Status: DISCONTINUED | OUTPATIENT
Start: 2021-10-01 | End: 2021-10-01

## 2021-10-01 RX ORDER — FENTANYL CITRATE 50 UG/ML
INJECTION, SOLUTION INTRAMUSCULAR; INTRAVENOUS
Status: COMPLETED
Start: 2021-10-01 | End: 2021-10-01

## 2021-10-01 RX ORDER — OXYBUTYNIN CHLORIDE 5 MG/1
5 TABLET ORAL DAILY
Status: DISCONTINUED | OUTPATIENT
Start: 2021-10-02 | End: 2021-10-03 | Stop reason: HOSPADM

## 2021-10-01 RX ORDER — ALBUTEROL SULFATE 0.83 MG/ML
2.5 SOLUTION RESPIRATORY (INHALATION)
Status: DISCONTINUED | OUTPATIENT
Start: 2021-10-01 | End: 2021-10-03 | Stop reason: HOSPADM

## 2021-10-01 RX ORDER — KETOROLAC TROMETHAMINE 30 MG/ML
INJECTION, SOLUTION INTRAMUSCULAR; INTRAVENOUS AS NEEDED
Status: DISCONTINUED | OUTPATIENT
Start: 2021-10-01 | End: 2021-10-01 | Stop reason: HOSPADM

## 2021-10-01 RX ORDER — HYDROMORPHONE HYDROCHLORIDE 1 MG/ML
0.5 INJECTION, SOLUTION INTRAMUSCULAR; INTRAVENOUS; SUBCUTANEOUS
Status: DISCONTINUED | OUTPATIENT
Start: 2021-10-01 | End: 2021-10-03 | Stop reason: HOSPADM

## 2021-10-01 RX ORDER — GABAPENTIN 300 MG/1
300 CAPSULE ORAL
Status: DISCONTINUED | OUTPATIENT
Start: 2021-10-01 | End: 2021-10-03 | Stop reason: HOSPADM

## 2021-10-01 RX ORDER — FLUOXETINE HYDROCHLORIDE 20 MG/1
40 CAPSULE ORAL DAILY
Status: DISCONTINUED | OUTPATIENT
Start: 2021-10-01 | End: 2021-10-03 | Stop reason: HOSPADM

## 2021-10-01 RX ORDER — TOPIRAMATE 25 MG/1
50 TABLET ORAL
Status: DISCONTINUED | OUTPATIENT
Start: 2021-10-01 | End: 2021-10-03 | Stop reason: HOSPADM

## 2021-10-01 RX ORDER — OXYCODONE HYDROCHLORIDE 5 MG/1
10 TABLET ORAL
Status: DISCONTINUED | OUTPATIENT
Start: 2021-10-01 | End: 2021-10-03 | Stop reason: HOSPADM

## 2021-10-01 RX ORDER — OXYCODONE HYDROCHLORIDE 5 MG/1
5 TABLET ORAL
Status: DISCONTINUED | OUTPATIENT
Start: 2021-10-01 | End: 2021-10-03 | Stop reason: HOSPADM

## 2021-10-01 RX ORDER — IBUPROFEN 600 MG/1
600 TABLET ORAL
Qty: 30 TABLET | Refills: 1 | Status: SHIPPED | OUTPATIENT
Start: 2021-10-01

## 2021-10-01 RX ADMIN — ONDANSETRON HYDROCHLORIDE 4 MG: 2 INJECTION, SOLUTION INTRAMUSCULAR; INTRAVENOUS at 11:38

## 2021-10-01 RX ADMIN — ENOXAPARIN SODIUM 40 MG: 40 INJECTION SUBCUTANEOUS at 18:26

## 2021-10-01 RX ADMIN — OXYCODONE 10 MG: 5 TABLET ORAL at 18:04

## 2021-10-01 RX ADMIN — METOPROLOL TARTRATE 25 MG: 25 TABLET, FILM COATED ORAL at 18:04

## 2021-10-01 RX ADMIN — SUGAMMADEX 200 MG: 100 INJECTION, SOLUTION INTRAVENOUS at 12:15

## 2021-10-01 RX ADMIN — Medication 3 AMPULE: at 11:01

## 2021-10-01 RX ADMIN — DIVALPROEX SODIUM 250 MG: 250 TABLET, DELAYED RELEASE ORAL at 18:04

## 2021-10-01 RX ADMIN — SUCCINYLCHOLINE CHLORIDE 160 MG: 20 INJECTION, SOLUTION INTRAMUSCULAR; INTRAVENOUS at 11:28

## 2021-10-01 RX ADMIN — Medication 10 ML: at 21:37

## 2021-10-01 RX ADMIN — GABAPENTIN 300 MG: 300 CAPSULE ORAL at 21:37

## 2021-10-01 RX ADMIN — ROCURONIUM BROMIDE 5 MG: 10 INJECTION INTRAVENOUS at 11:28

## 2021-10-01 RX ADMIN — FENTANYL CITRATE 50 MCG: 50 INJECTION, SOLUTION INTRAMUSCULAR; INTRAVENOUS at 11:28

## 2021-10-01 RX ADMIN — HYDROMORPHONE HYDROCHLORIDE 0.5 MG: 2 INJECTION, SOLUTION INTRAMUSCULAR; INTRAVENOUS; SUBCUTANEOUS at 11:46

## 2021-10-01 RX ADMIN — FENTANYL CITRATE 25 MCG: 50 INJECTION INTRAMUSCULAR; INTRAVENOUS at 13:09

## 2021-10-01 RX ADMIN — LIDOCAINE HYDROCHLORIDE 100 MG: 20 INJECTION, SOLUTION INTRAVENOUS at 11:28

## 2021-10-01 RX ADMIN — PROPOFOL 150 MG: 10 INJECTION, EMULSION INTRAVENOUS at 11:28

## 2021-10-01 RX ADMIN — OXYBUTYNIN CHLORIDE 5 MG: 5 TABLET ORAL at 18:29

## 2021-10-01 RX ADMIN — Medication 5 MG: at 11:38

## 2021-10-01 RX ADMIN — MIDAZOLAM HYDROCHLORIDE 2 MG: 1 INJECTION, SOLUTION INTRAMUSCULAR; INTRAVENOUS at 11:25

## 2021-10-01 RX ADMIN — DEXAMETHASONE SODIUM PHOSPHATE 8 MG: 4 INJECTION, SOLUTION INTRAMUSCULAR; INTRAVENOUS at 11:38

## 2021-10-01 RX ADMIN — SODIUM CHLORIDE, POTASSIUM CHLORIDE, SODIUM LACTATE AND CALCIUM CHLORIDE 25 ML/HR: 600; 310; 30; 20 INJECTION, SOLUTION INTRAVENOUS at 11:01

## 2021-10-01 RX ADMIN — FENTANYL CITRATE 25 MCG: 50 INJECTION INTRAMUSCULAR; INTRAVENOUS at 14:00

## 2021-10-01 RX ADMIN — FENTANYL CITRATE 25 MCG: 50 INJECTION INTRAMUSCULAR; INTRAVENOUS at 15:15

## 2021-10-01 RX ADMIN — DIVALPROEX SODIUM 250 MG: 250 TABLET, DELAYED RELEASE ORAL at 21:37

## 2021-10-01 RX ADMIN — Medication 10 ML: at 15:30

## 2021-10-01 RX ADMIN — FLUOXETINE 40 MG: 20 CAPSULE ORAL at 18:04

## 2021-10-01 RX ADMIN — FENTANYL CITRATE 50 MCG: 50 INJECTION, SOLUTION INTRAMUSCULAR; INTRAVENOUS at 11:37

## 2021-10-01 RX ADMIN — SODIUM CHLORIDE, POTASSIUM CHLORIDE, SODIUM LACTATE AND CALCIUM CHLORIDE 50 ML/HR: 600; 310; 30; 20 INJECTION, SOLUTION INTRAVENOUS at 15:31

## 2021-10-01 RX ADMIN — TOPIRAMATE 50 MG: 25 TABLET, FILM COATED ORAL at 21:37

## 2021-10-01 RX ADMIN — VANCOMYCIN HYDROCHLORIDE 1500 MG: 10 INJECTION, POWDER, LYOPHILIZED, FOR SOLUTION INTRAVENOUS at 11:01

## 2021-10-01 RX ADMIN — KETOROLAC TROMETHAMINE 30 MG: 30 INJECTION, SOLUTION INTRAMUSCULAR; INTRAVENOUS at 12:05

## 2021-10-01 RX ADMIN — Medication 5 MG: at 11:35

## 2021-10-01 RX ADMIN — FENTANYL CITRATE 25 MCG: 50 INJECTION INTRAMUSCULAR; INTRAVENOUS at 15:26

## 2021-10-01 RX ADMIN — ROCURONIUM BROMIDE 30 MG: 10 INJECTION INTRAVENOUS at 11:37

## 2021-10-01 NOTE — ANESTHESIA POSTPROCEDURE EVALUATION
Procedure(s):  LAPAROSCOPIC CHOLECYSTECTOMY WITH GRAMS. general    Anesthesia Post Evaluation        Patient location during evaluation: PACU  Note status: Adequate. Level of consciousness: responsive to verbal stimuli and sleepy but conscious  Pain management: satisfactory to patient  Airway patency: patent  Anesthetic complications: no  Cardiovascular status: acceptable  Respiratory status: acceptable  Hydration status: acceptable  Comments: +Post-Anesthesia Evaluation and Assessment    Patient: Chance Jones MRN: [id number]  SSN: [id number]   YOB: 1960  Age: 63 y.o. Sex: female      Cardiovascular Function/Vital Signs    /70 (BP 1 Location: Right upper arm, BP Patient Position: At rest)   Pulse 62   Temp 36.5 °C (97.7 °F)   Resp 12   Ht 5' 5\" (1.651 m)   Wt 106.4 kg (234 lb 9.1 oz)   SpO2 94%   BMI 39.03 kg/m²     Patient is status post Procedure(s):  LAPAROSCOPIC CHOLECYSTECTOMY WITH GRAMS. Nausea/Vomiting: Controlled. Postoperative hydration reviewed and adequate. Pain:  Pain Scale 1: Numeric (0 - 10) (10/01/21 1526)  Pain Intensity 1: 8 (10/01/21 1526)   Managed. Neurological Status:   Neuro (WDL): Exceptions to WDL (10/01/21 1227)   At baseline. Mental Status and Level of Consciousness: Arousable. Pulmonary Status:   O2 Device: None (Room air) (10/01/21 1500)   Adequate oxygenation and airway patent. Complications related to anesthesia: None    Post-anesthesia assessment completed. No concerns. Signed By: Naima Puente MD    10/1/2021  Post anesthesia nausea and vomiting:  controlled      INITIAL Post-op Vital signs:   Vitals Value Taken Time   /76 10/01/21 1530   Temp 36.5 °C (97.7 °F) 10/01/21 1230   Pulse 63 10/01/21 1540   Resp 13 10/01/21 1540   SpO2 92 % 10/01/21 1540   Vitals shown include unvalidated device data.

## 2021-10-01 NOTE — INTERVAL H&P NOTE
Update History & Physical    The Patient's History and Physical was reviewed with the patient and I examined the patient. There was no change. The surgical site was confirmed by the patient and me. Plan:  The risk, benefits, expected outcome, and alternative to the recommended procedure have been discussed with the patient. Patient understands and wants to proceed with the procedure.     Electronically signed by Fred Jewell MD on 10/1/2021 at 10:51 AM

## 2021-10-01 NOTE — PROGRESS NOTES
End of Shift Note    Bedside shift change report given to CONOR Harvey (oncoming nurse) by Ness Cheek RN (offgoing nurse). Report included the following information SBAR, Kardex, OR Summary, Intake/Output, MAR and Recent Results    Shift worked:  7a to 7p     Shift summary and any significant changes:     Admitted from PACU today, S/P Lap Choley. Tolerating diet well. Probable D/C home tomorrow am.     Concerns for physician to address:       Zone phone for oncoming shift:   [phone]       Activity:  Activity Level: Up with Assistance  Number times ambulated in hallways past shift: 0  Number of times OOB to chair past shift: 0    Cardiac:   Cardiac Monitoring: No      Cardiac Rhythm: Sinus Rhythm    Access:   Current line(s): PIV     Genitourinary:   Urinary status: voiding and incontinent    Respiratory:   O2 Device: None (Room air)  Chronic home O2 use?: NO  Incentive spirometer at bedside: YES     GI:  Last Bowel Movement Date: 10/01/21  Current diet:  ADULT DIET Regular  Passing flatus: YES  Tolerating current diet: YES       Pain Management:   Patient states pain is manageable on current regimen: YES    Skin:  Renato Score: 18  Interventions: increase time out of bed    Patient Safety:  Fall Score:  Total Score: 3  Interventions: gripper socks  High Fall Risk: Yes    Length of Stay:  Expected LOS: - - -  Actual LOS: 0      Ness Cheek RN

## 2021-10-01 NOTE — DISCHARGE INSTRUCTIONS
Dr. Cassi Ontiveros Discharge Instructions after  Laparoscopic Cholecystectomy      Anthony Munoz   [id number] : 1960    Admitted 10/1/2021 Discharged: 10/1/2021       What to do at Home    Recommended diet: Low Fat Diet for 1 month    Recommended activity: as tolerated, do not drive for 3-5 days while on pain medicine. Follow-up with Dr. Raymundo Pizano  in 2 weeks. Call [phone] for an appointment. Cholecystectomy: What to Expect at [facility]    After your surgery, it is normal to feel weak and tired for several days after you return home. Your belly may be swollen. If you had laparoscopic surgery, you may also have pain in your shoulder for about 24 hours. You may have gas or need to burp a lot at first, and a few people get diarrhea. The diarrhea usually goes away in 2 to 4 weeks, but it may last longer. How quickly you recover depends on whether you had a laparoscopic or open surgery.  For a laparoscopic surgery, most people can go back to work or their normal routine in 1 to 2 weeks, but it may take longer, depending on the type of work you do.  For an open surgery, it will probably take 4 to 6 weeks before you get back to your normal routine. This care sheet gives you a general idea about how long it will take for you to recover. However, each person recovers at a different pace. Follow the steps below to get better as quickly as possible. How can you care for yourself at home? Activity   Rest when you feel tired. Getting enough sleep will help you recover.  Try to walk each day. Start out by walking a little more than you did the day before. Gradually increase the amount you walk. Walking boosts blood flow and helps prevent pneumonia and constipation.  Avoid strenuous activities, such as biking, jogging, weightlifting, and aerobic exercise, for 1-2 weeks.  You may shower 24 hours after surgery. Pat the cut (incision) dry.  Do not take a bath for the first 2 weeks, or until your doctor tells you it is okay.  You may drive when you are no longer taking pain medicine and can quickly move your foot from the gas pedal to the brake. You must also be able to sit comfortably for a long period of time, even if you do not plan to go far.  For a laparoscopic surgery, most people can go back to work or their normal routine in 1 to 2 weeks, but it may take longer. For an open surgery, it will probably take 4 to 6 weeks before you get back to your normal routine. Diet   Eat smaller meals more often instead of fewer larger meals. You can eat a normal diet, but avoid eating fatty foods for about 1 month. Fatty foods include hamburger, whole milk, cheese, and many snack foods. If your stomach is upset, try bland, low-fat foods like plain rice, broiled chicken, toast, and yogurt.  Drink plenty of fluids (unless your doctor tells you not to).  If you have diarrhea, try avoiding spicy foods, dairy products, fatty foods, and alcohol. You can also watch to see if specific foods cause it, and stop eating them. If the diarrhea continues for more than 2 weeks, talk to your doctor.  You may notice that your bowel movements are not regular right after your surgery. This is common. Try to avoid constipation and straining with bowel movements. You may want to take a fiber supplement every day. If you have not had a bowel movement after a couple of days, ask your doctor about taking a mild laxative. Medicines   Take pain medicines exactly as directed.  If the doctor gave you a prescription medicine for pain, take it as prescribed.  If you are not taking a prescription pain medicine, take an over-the-counter medicine such as acetaminophen (Tylenol), ibuprofen (Advil, Motrin), or naproxen (Aleve). Read and follow all instructions on the label.  Do not take two or more pain medicines at the same time unless the doctor told you to.  Many pain medicines contain acetaminophen, which is Tylenol. Too much Tylenol can be harmful.  If you think your pain medicine is making you sick to your stomach:   Take your medicine after meals (unless your doctor tells you not to).  Ask your doctor for a different pain medicine.  If your doctor prescribed antibiotics, take them as directed. Do not stop taking them just because you feel better. You need to take the full course of antibiotics. Incision care   After 24 to 48 hours, wash the area daily with warm, soapy water, and pat it dry.  You may have staples to hold the cut together. Keep them dry until your doctor takes them out. This is usually in 7 to 10 days.  Keep the area clean and dry. You may cover it with a gauze bandage if it weeps or rubs against clothing. Change the bandage every day. Ice   To reduce swelling and pain, put ice or a cold pack on your belly for 10 to 15 minutes at a time. Do this every 1 to 2 hours. Put a thin cloth between the ice and your skin. Follow-up care is a key part of your treatment and safety. Be sure to make and go to all appointments, and call your doctor if you are having problems. Its also a good idea to know your test results and keep a list of the medicines you take. When should you call for help? Call 911 anytime you think you may need emergency care. For example, call if:   You pass out (lose consciousness).  You have severe trouble breathing.  You have sudden chest pain and shortness of breath, or you cough up blood. Call your doctor now or seek immediate medical care if:   You are sick to your stomach and cannot drink fluids.  You have pain that does not get better when you take your pain medicine.  You have signs of infection, such as:   Increased pain, warmth, or excessive (>1inch) redness.  Red streaks leading from the incision.  Pus draining from the incision.  Swollen lymph nodes in your neck, armpits, or groin.  A fever.    Your urine turns dark Mertha Donis or your stool is light-colored or sulma-colored.  Your skin turns yellow.  Bright red blood has soaked through a large bandage over your incision.  You have signs of a blood clot, such as:   Pain in your calf, back of knee, thigh, or groin.  Redness and swelling in your leg or groin.  You have trouble passing urine or stool, especially if you have mild pain or swelling in your lower belly. Watch closely for any changes in your health, and be sure to contact your doctor if:   You do not have a bowel movement after taking a laxative.

## 2021-10-01 NOTE — OP NOTES
CHOLECYSTECTOMY OPERATIVE REPORT    10/1/2021        Pre-operative Diagnosis: CHOLELITHIASIS    Post-operative Diagnosis: cholelithiasis      OPERATIVE PROCEDURE:  1. Laparoscopic cholecystectomy. 2.  Intraoperative cholangiogram with intraoperative interpritation. Surgeon: Nathanial Apgar, MD    Assistant: Circ-1: Josse Kevin RN  Circ-Intern: Bianca Newman RN  Scrub Tech-1: [facility]  Surg Asst-1: Aliyah Phan    Anesthesia: General plus Local    Estimated Blood Loss: Minimal    FINDINGS:   The patient was noted to have a gallbladder with stones. Intraoperative cholangiogram was obtained. Radiologist was not present during the case. Intraoperative interpretation revealed filling of a normal length cystic duct, a common bile duct, and all proximal and distal structures with free flow of contrast into the duodenum without any filling defects noted. yes, There was limited filling of the proximal ducts due to rapid flow of contrast into the duodenum. and  The liver appeared normal, and the remaining abdominal cavity appeared normal.    SPECIMEN:    ID Type Source Tests Collected by Time Destination   1 : Gallbladder Preservative Gallbladder  Juliana Ayala MD 10/1/2021 1148 Pathology        DRAINS:  None. COMPLICATIONS:  None. DESCRIPTION OF PROCEDURE:   Patient was taken to the operating room and placed on the table in supine position. Time-outs were performed using both preinduction and pre-incision safety checklist to verify correct patient, procedure, site, and additional critical information prior to beginning the procedure. General anesthesia was initiated and patient intubated. Patient was then prepped and draped in a sterile fashion. A periumbilical incision was then made. A 5-mm 0-degree laparoscope was inserted into a 5-mm Optiview Trocar. The peritoneum was entered under direct visualization.  The abdomen was then insufflated with carbon dioxide to a pressure of 15 mmHg. The patient tolerated insufflation well. The laparoscope was inserted and the abdomen inspected to ensure no injuries occurred with initial port placement. The table was placed in reverse Trendelenburg with right side up. Additional ports were then placed as follows: a 5-mm subxiphoid port and two percutaneous ports along the right subcostal margin. Inspection of the abdomen showed adhesions to the gallbladder. The gallbladder fundus was grasped and retracted cephalad over the liver. Adhesions between the gallbladder and omentum were taken down carefully. The infundibulum was then retracted inferior-laterally to expose Calots triangle. Peritoneum overlying the infundibulum was incised and stripped inferiorly. The cystic duct and artery were identified and dissected circumferentially until the critical view of safety was obtained. A clip was placed on the cystic duct close to the neck of the gallbladder. A nick was made in the cystic duct and a cholangiogram catheter inserted. A cholangiogram was obtained under dynamic fluoroscopy showing good flow of bile into the duodenum, an intact biliary tree, and absence of any filling defects. The cystic duct and artery were then doubly clipped and divided. The gallbladder was then dissected off the liver bed using electrocautery. Hemostasis was achieved. The gallbladder was placed in an endoscopic retrieval bag and removed through the umbilical incision. Irrigation was performed and any spilled stones/bile was suctioned. A Yovani-Bettye needle was used to pass an 0-Vicryl under direct vision to close the fascia at the umbilical port site. The pneumoperitoneum was evacuated and the ports were removed. The incisions were injected with local anesthetic. The skin of all incisions was re-approximated with subcuticular 4-0 monocryl suture and Dermabond was applied.   A debriefing checklist was completed to share information critical to postoperative care of the patient. The patient was extubated in the OR and transferred to PACU in stable condition. Counts: Instrument, sponge, and needle counts were correct prior to closure and at the conclusion of the case.      Signed By:  Ramírez Drew MD     October 1, 2021

## 2021-10-01 NOTE — ANESTHESIA PREPROCEDURE EVALUATION
Relevant Problems   RESPIRATORY SYSTEM   (+) BURTON (obstructive sleep apnea)      NEUROLOGY   (+) Migraine      CARDIOVASCULAR   (+) Essential hypertension   (+) Paroxysmal atrial fibrillation (HCC)      ENDOCRINE   (+) Class 3 severe obesity due to excess calories without serious comorbidity in adult [facility])       Anesthetic History   No history of anesthetic complications            Review of Systems / Medical History  Patient summary reviewed and pertinent labs reviewed    Pulmonary        Sleep apnea    Asthma        Neuro/Psych   Within defined limits           Cardiovascular    Hypertension: well controlled        Dysrhythmias : atrial fibrillation      Exercise tolerance: >4 METS  Comments: TTE  · LV: Estimated LVEF is 65 - 70%. Visually measured ejection fraction. Normal cavity size, wall thickness, systolic function (ejection fraction normal) and diastolic function. Age-appropriate left ventricular diastolic function. · TV: Pulmonary hypertension not suggested by Doppler findings.    GI/Hepatic/Renal     GERD: well controlled           Endo/Other        Morbid obesity and arthritis     Other Findings   Comments: Fibromyalgia  DJD           Physical Exam    Airway  Mallampati: III  TM Distance: > 6 cm  Neck ROM: normal range of motion   Mouth opening: Normal     Cardiovascular  Regular rate and rhythm,  S1 and S2 normal,  no murmur, click, rub, or gallop  Rhythm: regular  Rate: normal         Dental  No notable dental hx       Pulmonary  Breath sounds clear to auscultation               Abdominal  GI exam deferred       Other Findings            Anesthetic Plan    ASA: 3  Anesthesia type: general    Monitoring Plan: BIS      Induction: Intravenous  Anesthetic plan and risks discussed with: Patient

## 2021-10-01 NOTE — PERIOP NOTES
TRANSFER - OUT REPORT:    Verbal report given to Re Stone RN(name) on Oralia Archibald  being transferred to [facility](unit) for routine post - op       Report consisted of patients Situation, Background, Assessment and   Recommendations(SBAR). Information from the following report(s) SBAR, Kardex, ED Summary, OR Summary, Procedure Summary, Intake/Output, MAR, Accordion, Recent Results, Med Rec Status, Cardiac Rhythm SB/NSR, Alarm Parameters , Pre Procedure Checklist, Procedure Verification and Quality Measures was reviewed with the receiving nurse. Opportunity for questions and clarification was provided.       Patient transported with:   [employer]

## 2021-10-01 NOTE — PERIOP NOTES
1034 - PT'S COVID TEST RESULTED NEG - PT DENIES S/S OF COVID - NO FEVER, COLD, COUGH, SOB, N/V, DIARRHEA. .. PRE-OP TCHING DONE - PT VERBALIZES UNDERSTANDING. STRETCHER IN LOWEST POSITION, CB IN PLACE AND SR UP X2.

## 2021-10-02 ENCOUNTER — APPOINTMENT (OUTPATIENT)
Dept: GENERAL RADIOLOGY | Age: 61
End: 2021-10-02
Attending: HOSPITALIST
Payer: MEDICAID

## 2021-10-02 VITALS
RESPIRATION RATE: 16 BRPM | SYSTOLIC BLOOD PRESSURE: 111 MMHG | HEART RATE: 58 BPM | OXYGEN SATURATION: 98 % | WEIGHT: 234.57 LBS | TEMPERATURE: 97.6 F | BODY MASS INDEX: 39.08 KG/M2 | HEIGHT: 65 IN | DIASTOLIC BLOOD PRESSURE: 57 MMHG

## 2021-10-02 LAB
ALBUMIN SERPL-MCNC: 2.9 G/DL (ref 3.5–5)
ALBUMIN/GLOB SERPL: 0.7 {RATIO} (ref 1.1–2.2)
ALP SERPL-CCNC: 73 U/L (ref 45–117)
ALT SERPL-CCNC: 61 U/L (ref 12–78)
ANION GAP SERPL CALC-SCNC: 5 MMOL/L (ref 5–15)
APTT PPP: 27.2 SEC (ref 22.1–31)
AST SERPL-CCNC: 50 U/L (ref 15–37)
BASOPHILS # BLD: 0 K/UL (ref 0–0.1)
BASOPHILS NFR BLD: 0 % (ref 0–1)
BILIRUB SERPL-MCNC: 0.6 MG/DL (ref 0.2–1)
BUN SERPL-MCNC: 19 MG/DL (ref 6–20)
BUN/CREAT SERPL: 29 (ref 12–20)
CALCIUM SERPL-MCNC: 8.7 MG/DL (ref 8.5–10.1)
CHLORIDE SERPL-SCNC: 108 MMOL/L (ref 97–108)
CO2 SERPL-SCNC: 26 MMOL/L (ref 21–32)
CREAT SERPL-MCNC: 0.65 MG/DL (ref 0.55–1.02)
DIFFERENTIAL METHOD BLD: ABNORMAL
EOSINOPHIL # BLD: 0 K/UL (ref 0–0.4)
EOSINOPHIL NFR BLD: 0 % (ref 0–7)
ERYTHROCYTE [DISTWIDTH] IN BLOOD BY AUTOMATED COUNT: 15.4 % (ref 11.5–14.5)
GLOBULIN SER CALC-MCNC: 3.9 G/DL (ref 2–4)
GLUCOSE SERPL-MCNC: 114 MG/DL (ref 65–100)
HCT VFR BLD AUTO: 39.3 % (ref 35–47)
HGB BLD-MCNC: 12.5 G/DL (ref 11.5–16)
IMM GRANULOCYTES # BLD AUTO: 0 K/UL (ref 0–0.04)
IMM GRANULOCYTES NFR BLD AUTO: 0 % (ref 0–0.5)
INR PPP: 1 (ref 0.9–1.1)
LYMPHOCYTES # BLD: 1.3 K/UL (ref 0.8–3.5)
LYMPHOCYTES NFR BLD: 13 % (ref 12–49)
MCH RBC QN AUTO: 29.4 PG (ref 26–34)
MCHC RBC AUTO-ENTMCNC: 31.8 G/DL (ref 30–36.5)
MCV RBC AUTO: 92.5 FL (ref 80–99)
MONOCYTES # BLD: 1.3 K/UL (ref 0–1)
MONOCYTES NFR BLD: 13 % (ref 5–13)
NEUTS SEG # BLD: 7.6 K/UL (ref 1.8–8)
NEUTS SEG NFR BLD: 74 % (ref 32–75)
NRBC # BLD: 0 K/UL (ref 0–0.01)
NRBC BLD-RTO: 0 PER 100 WBC
PLATELET # BLD AUTO: 210 K/UL (ref 150–400)
PMV BLD AUTO: 9.4 FL (ref 8.9–12.9)
POTASSIUM SERPL-SCNC: 3.8 MMOL/L (ref 3.5–5.1)
PROT SERPL-MCNC: 6.8 G/DL (ref 6.4–8.2)
PROTHROMBIN TIME: 10.8 SEC (ref 9–11.1)
RBC # BLD AUTO: 4.25 M/UL (ref 3.8–5.2)
SODIUM SERPL-SCNC: 139 MMOL/L (ref 136–145)
THERAPEUTIC RANGE,PTTT: NORMAL SECS (ref 58–77)
TROPONIN I SERPL-MCNC: <0.05 NG/ML
TROPONIN I SERPL-MCNC: <0.05 NG/ML
WBC # BLD AUTO: 10.2 K/UL (ref 3.6–11)

## 2021-10-02 PROCEDURE — 85730 THROMBOPLASTIN TIME PARTIAL: CPT

## 2021-10-02 PROCEDURE — 85610 PROTHROMBIN TIME: CPT

## 2021-10-02 PROCEDURE — 84484 ASSAY OF TROPONIN QUANT: CPT

## 2021-10-02 PROCEDURE — 80053 COMPREHEN METABOLIC PANEL: CPT

## 2021-10-02 PROCEDURE — 90686 IIV4 VACC NO PRSV 0.5 ML IM: CPT | Performed by: SURGERY

## 2021-10-02 PROCEDURE — 74022 RADEX COMPL AQT ABD SERIES: CPT

## 2021-10-02 PROCEDURE — 36415 COLL VENOUS BLD VENIPUNCTURE: CPT

## 2021-10-02 PROCEDURE — 94760 N-INVAS EAR/PLS OXIMETRY 1: CPT

## 2021-10-02 PROCEDURE — 74011250637 HC RX REV CODE- 250/637: Performed by: SURGERY

## 2021-10-02 PROCEDURE — 93005 ELECTROCARDIOGRAM TRACING: CPT

## 2021-10-02 PROCEDURE — 85025 COMPLETE CBC W/AUTO DIFF WBC: CPT

## 2021-10-02 PROCEDURE — 74011250636 HC RX REV CODE- 250/636: Performed by: SURGERY

## 2021-10-02 PROCEDURE — 90471 IMMUNIZATION ADMIN: CPT

## 2021-10-02 RX ADMIN — Medication 10 ML: at 05:55

## 2021-10-02 RX ADMIN — INFLUENZA VIRUS VACCINE 0.5 ML: 15; 15; 15; 15 SUSPENSION INTRAMUSCULAR at 18:46

## 2021-10-02 RX ADMIN — PANTOPRAZOLE SODIUM 40 MG: 40 TABLET, DELAYED RELEASE ORAL at 05:55

## 2021-10-02 RX ADMIN — DOCUSATE SODIUM 50MG AND SENNOSIDES 8.6MG 1 TABLET: 8.6; 5 TABLET, FILM COATED ORAL at 09:03

## 2021-10-02 RX ADMIN — CETIRIZINE HYDROCHLORIDE 10 MG: 10 TABLET, FILM COATED ORAL at 09:01

## 2021-10-02 RX ADMIN — OXYBUTYNIN CHLORIDE 5 MG: 5 TABLET ORAL at 09:03

## 2021-10-02 RX ADMIN — DIVALPROEX SODIUM 250 MG: 250 TABLET, DELAYED RELEASE ORAL at 16:03

## 2021-10-02 RX ADMIN — ENOXAPARIN SODIUM 40 MG: 40 INJECTION SUBCUTANEOUS at 13:05

## 2021-10-02 RX ADMIN — MELOXICAM 7.5 MG: 7.5 TABLET ORAL at 09:03

## 2021-10-02 RX ADMIN — DIVALPROEX SODIUM 250 MG: 250 TABLET, DELAYED RELEASE ORAL at 09:02

## 2021-10-02 RX ADMIN — ATORVASTATIN CALCIUM 20 MG: 20 TABLET, FILM COATED ORAL at 09:02

## 2021-10-02 RX ADMIN — AMLODIPINE BESYLATE 5 MG: 5 TABLET ORAL at 09:03

## 2021-10-02 RX ADMIN — OXYCODONE 5 MG: 5 TABLET ORAL at 05:55

## 2021-10-02 RX ADMIN — Medication 10 ML: at 13:06

## 2021-10-02 RX ADMIN — OXYCODONE 10 MG: 5 TABLET ORAL at 10:36

## 2021-10-02 RX ADMIN — POLYETHYLENE GLYCOL 3350 17 G: 17 POWDER, FOR SOLUTION ORAL at 09:04

## 2021-10-02 RX ADMIN — FLUOXETINE 40 MG: 20 CAPSULE ORAL at 09:03

## 2021-10-02 NOTE — DISCHARGE SUMMARY
Physician Discharge Summary     Patient ID:  Molina Renee  [id number]  63 y.o.  1960    Allergies: Ace inhibitors, Aspirin, Other medication, Penicillins, and Sulfa (sulfonamide antibiotics)    Admit Date: 10/1/2021    Discharge Date: 10/2/2021    * Admission Diagnoses: Cholelithiasis [K80.20]    * Discharge Diagnoses:    Hospital Problems as of 10/2/2021 Date Reviewed: 9/17/2021        Codes Class Noted - Resolved POA    Cholelithiasis ICD-10-CM: K80.20  ICD-9-CM: 574.20  9/17/2021 - Present Unknown               Admission Condition: Good    * Discharge Condition: good    * Procedures: Procedure(s):  [address] Course:   Normal hospital course for this procedure. Had rapid response for chest pain POD#1, workup negative and symptoms resolved. Consults: None    Disposition: Home    Discharge Medications:   Current Discharge Medication List      START taking these medications    Details   oxyCODONE IR (ROXICODONE) 5 mg immediate release tablet Take 1 Tablet by mouth every four (4) hours as needed for Pain for up to 3 days. Max Daily Amount: 30 mg.  Qty: 15 Tablet, Refills: 0  Start date: 10/1/2021, End date: 10/4/2021    Associated Diagnoses: Calculus of gallbladder without cholecystitis without obstruction; Class 3 severe obesity due to excess calories without serious comorbidity with body mass index (BMI) of 40.0 to 44.9 in adult (HCC)      ibuprofen (MOTRIN) 600 mg tablet Take 1 Tablet by mouth every six (6) hours as needed for Pain. Qty: 30 Tablet, Refills: 1  Start date: 10/1/2021         CONTINUE these medications which have NOT CHANGED    Details   meloxicam (MOBIC) 7.5 mg tablet Take 7.5 mg by mouth daily. Omeprazole delayed release (PRILOSEC D/R) 20 mg tablet Take 20 mg by mouth daily. atorvastatin (LIPITOR) 20 mg tablet Take 20 mg by mouth daily. divalproex DR (Depakote) 250 mg tablet Take 250 mg by mouth three (3) times daily as needed. gabapentin (NEURONTIN) 300 mg capsule Take 300 mg by mouth nightly. topiramate (TOPAMAX) 50 mg tablet Take 50 mg by mouth nightly. metoprolol tartrate (LOPRESSOR) 25 mg tablet TAKE 1 TABLET BY MOUTH TWICE DAILY FOR BLOOD PRESSURE AND HEART PALPITATIONS      albuterol (PROVENTIL HFA, VENTOLIN HFA, PROAIR HFA) 90 mcg/actuation inhaler Take 2 Puffs by inhalation every four (4) hours as needed for Wheezing. beclomethasone (BECONASE AQ) 42 mcg (0.042 %) nasal spray 2 Puffs by Both Nostrils route two (2) times a day. Indications: inflammation of the nose due to an allergy      FLUoxetine (PROzac) 40 mg capsule TAKE 1 CAPSULE BY MOUTH ONCE DAILY      oxybutynin (DITROPAN) 5 mg tablet daily. amLODIPine (NORVASC) 5 mg tablet Take 1 Tab by mouth daily. Qty: 30 Tab, Refills: 0      cetirizine (ZYRTEC) 10 mg tablet Take 10 mg by mouth. * Follow-up Care/Patient Instructions:   Activity: No heavy lifting for 6 weeks  Diet: Low fat, Low cholesterol  Wound Care: Keep wound clean and dry    Follow-up Information     Follow up With Specialties Details Why Contact Info    Moises Colunga MD General Surgery In 2 weeks  [facility]  [facility] [address] [place] [address]  [phone]          Follow-up tests/labs none    Signed:  Bridgette Pride MD, FACS  10/2/2021  3:34 PM

## 2021-10-02 NOTE — PROGRESS NOTES
End of Shift Note    Bedside shift change report given to CONOR Harvey (oncoming nurse) by Annie Madsen RN (offgoing nurse). Report included the following information SBAR, Kardex, OR Summary, Intake/Output, MAR and Recent Results    Shift worked:  7a to 7p     Shift summary and any significant changes:     RRT this morning for chest pain, troponins negative, using oral pain meds for pain. Discharged,  Awaiting ride to go home, all D/C paperwork has been given to patient. Concerns for physician to address:       Zone phone for oncoming shift:   [phone]       Activity:  Activity Level: Ambulate X (#)  Number times ambulated in hallways past shift: 0  Number of times OOB to chair past shift: 0    Cardiac:   Cardiac Monitoring: No      Cardiac Rhythm: Sinus Rhythm    Access:   Current line(s): PIV     Genitourinary:   Urinary status: voiding and incontinent    Respiratory:   O2 Device: None (Room air)  Chronic home O2 use?: NO  Incentive spirometer at bedside: YES  Actual Volume (ml): 750 ml  GI:  Last Bowel Movement Date: 10/01/21  Current diet:  ADULT DIET Regular  Passing flatus: YES  Tolerating current diet: YES       Pain Management:   Patient states pain is manageable on current regimen: YES    Skin:  Renato Score: 20  Interventions: increase time out of bed    Patient Safety:  Fall Score:  Total Score: 3  Interventions: gripper socks  High Fall Risk: Yes    Length of Stay:  Expected LOS: - - -  Actual LOS: 0      Annie Madsen RN

## 2021-10-02 NOTE — PROGRESS NOTES
RAPID RESPONSE TEAM    Responded to overhead rapid response to 3215 for CHEST PAIN    Patient c/o SSCP radiating to left chest, back and arm after using IS. Described as \"sharp like a knife piercing through me\", 10/10, reproducible with light palpation. Denies nausea, vomiting or pain on inspiration. Upon arrival pt is in bed, NAD, oxygen saturation 98% on RA, with ice pack to abdomen. Patient is POD1 after lap cholec. Incisions CDI, no erythema or drainage noted. Lungs CTA bilaterally, no crepitus. +bowel sounds, abd tender to light palpation, soft, states not passing flatus. Dr. Cheri Rosas at bedside. STAT EKG obtained- SB w/sinus arhythmia non- ischemic. Orders received for STAT CBC/CMP/TROPX2/PT/INR/KUB/POR CXR. Primary nurse to notify surgeon of RRT. Encourage patient to ambulate. Visit Vitals  BP (!) 149/89   Pulse (!) 55   Temp 98.3 °F (36.8 °C)   Resp 16   Ht 5' 5\" (1.651 m)   Wt 106.4 kg (234 lb 9.1 oz)   SpO2 98%   BMI 39.03 kg/m²       Patient to remain in 3215. Please call back if needed.       Kvng. Matt Loomis 57  Rapid Response Team  X. [phone]

## 2021-10-02 NOTE — PROGRESS NOTES
Hospitalist    Responded to overhead rapid response chest pain. RRT team at bedside. Patient with acute onset of 10/10 sharp substernal chest pain radiate into left chest and shoulder, associated with SOB, mild nausea. No vomiting    Slept well overnight using cpap and been on RA. Did not ask for pain medication until 6am since lap gracie yesterday. No flatus yet    98.3 55 149/89  98%    Gen -- pleasant overweight female, awake, alert, grimacing with pain. No respiratory distress. Not diaphoretic  Chest -- CTAB, no wheezing or crackles. Left chest wall tender to light palpation, no crepitus. CV -- RRR, no m/g/r  Abd -- severe tenderness in RUQ even to just light touch. Laprascopic incisions with glue and intact.  + BS  Ext -- no edema in lower legs. 2+ right radial pulse    EKG reviewed at bedside:  Sinus guilherme 58, normal intervals, no ischemic changes; no significant change from July 2021    Imp/plan:   Musculoskeletal chest and abdominal pain POD #1 lap gracie with IOC. Pain seems out of proportion for postop lap gracie patient. Will check CBC, CMP, coags, acute abdominal series. If these are ok, encourage patient to get out of bed, ambulate    Fibromyalgia -- suspect this is playing a role in her pain perception post surgery. She has not needed pain medication since PACU stay until 6am today. Continue with mobic, gabapentin. HTN -- BP controlled on amlodipine and metoprolol    Discussed with rapid response team.  Flori Telles RN, will notify primary attending. Addendum:  Labs reviewed -- essentially normal, trop <0.05, AST only slight elevated 50. Acute abd series reviewed personally:  hypoinflated lungs, no infiltrate, stool throughout bowels, 2 dilated bowel loops but no air fluid level.     Braeden Levin MD  Time spent 30 minutes

## 2021-10-02 NOTE — PROGRESS NOTES
Problem: Pressure Injury - Risk of  Goal: *Prevention of pressure injury  Description: Document Renato Scale and appropriate interventions in the flowsheet. Outcome: Resolved/Met  Note: Pressure Injury Interventions:  Sensory Interventions: Assess changes in LOC    Moisture Interventions: Absorbent underpads    Activity Interventions: Increase time out of bed    Mobility Interventions: HOB 30 degrees or less, Float heels    Nutrition Interventions: Document food/fluid/supplement intake                     Problem: Patient Education: Go to Patient Education Activity  Goal: Patient/Family Education  Outcome: Resolved/Met     Problem: Pain  Goal: *Control of Pain  10/2/2021 1532 by Julieanne Sicard, RN  Outcome: Resolved/Met  10/2/2021 1201 by Julieanne Sicard, RN  Outcome: Progressing Towards Goal     Problem: Patient Education: Go to Patient Education Activity  Goal: Patient/Family Education  Outcome: Resolved/Met     Problem: Falls - Risk of  Goal: *Absence of Falls  Description: Document Pat Fall Risk and appropriate interventions in the flowsheet.   10/2/2021 1532 by Julieanne Sicard, RN  Outcome: Resolved/Met  Note: Fall Risk Interventions:  Mobility Interventions: Communicate number of staff needed for ambulation/transfer, Utilize walker, cane, or other assistive device         Medication Interventions: Teach patient to arise slowly, Patient to call before getting OOB    Elimination Interventions: Call light in reach           10/2/2021 1201 by Julieanne Sicard, RN  Outcome: Progressing Towards Goal  Note: Fall Risk Interventions:  Mobility Interventions: Communicate number of staff needed for ambulation/transfer, Utilize walker, cane, or other assistive device         Medication Interventions: Teach patient to arise slowly, Patient to call before getting OOB    Elimination Interventions: Call light in reach              Problem: Patient Education: Go to Patient Education Activity  Goal: Patient/Family Education  Outcome: Resolved/Met

## 2021-10-02 NOTE — PROGRESS NOTES
End of Shift Note    Bedside shift change report given to Mami Ybarra (oncoming nurse) by Panfilo Malagon (offgoing nurse). Report included the following information SBAR, Kardex, Intake/Output, MAR and Recent Results    Shift worked:  1900--0700     Shift summary and any significant changes:     Patient recovering well after lap choley. Patient ambulated in room and to the bathroom tolerating well. Patient medicated for moderate pain with Oxycodone. Concerns for physician to address:       Zone phone for oncoming shift:   [phone]       Activity:  Activity Level: Up with Assistance  Number times ambulated in hallways past shift: 0  Number of times ambulated in room: 1    Cardiac:   Cardiac Monitoring: No      Cardiac Rhythm: Sinus Rhythm    Access:   Current line(s): PIV     Genitourinary:   Urinary status: voiding    Respiratory:   O2 Device: None (Room air)  Chronic home O2 use?: N/A  Incentive spirometer at bedside: YES  Actual Volume (ml): 750 ml  GI:  Last Bowel Movement Date: 10/01/21  Current diet:  ADULT DIET Regular  Passing flatus: YES  Tolerating current diet: YES       Pain Management:   Patient states pain is manageable on current regimen: YES    Skin:  Renato Score: 18  Interventions: float heels, increase time out of bed and nutritional support     Patient Safety:  Fall Score:  Total Score: 3  Interventions: bed/chair alarm, assistive device (walker, cane, etc), gripper socks and pt to call before getting OOB  High Fall Risk: Yes    Length of Stay:  Expected LOS: - - -  Actual LOS: 0      [facility]

## 2021-10-02 NOTE — PROGRESS NOTES
0700  Patient complaint of heaviness in her chest along with sharp pain radiating to her arms. Rapid response called for chest pain. EKG done and looks normal\labs drawn and sent. Chest and Abdominal x-ray ordered. Patient is stable.

## 2021-10-02 NOTE — PROGRESS NOTES
Problem: Pain  Goal: *Control of Pain  Outcome: Progressing Towards Goal     Problem: Falls - Risk of  Goal: *Absence of Falls  Description: Document Pat Fall Risk and appropriate interventions in the flowsheet.   Outcome: Progressing Towards Goal  Note: Fall Risk Interventions:  Mobility Interventions: Communicate number of staff needed for ambulation/transfer, Utilize walker, cane, or other assistive device         Medication Interventions: Teach patient to arise slowly, Patient to call before getting OOB    Elimination Interventions: Call light in reach

## 2021-10-02 NOTE — PROGRESS NOTES
Admit Date: 10/1/2021    POD 1 Day Post-Op    Procedure:  Procedure(s):  LAPAROSCOPIC CHOLECYSTECTOMY WITH GRAMS    Subjective:     Patient had rapid response for chest pain this morning. Not in neck or shoulder. Had also been seen for similar episode in ED recently. Initial troponin normal.    Objective:     Blood pressure 109/66, pulse (!) 51, temperature 97.8 °F (36.6 °C), resp. rate 16, height 5' 5\" (1.651 m), weight 234 lb 9.1 oz (106.4 kg), SpO2 98 %. Temp (24hrs), Av.9 °F (36.6 °C), Min:97.4 °F (36.3 °C), Max:98.3 °F (36.8 °C)      Physical Exam:  GENERAL: alert, cooperative, no distress, appears stated age, LUNG: clear to auscultation bilaterally, HEART: regular rate and rhythm, ABDOMEN: soft, non-tender. Bowel sounds normal. No masses,  no organomegaly, wounds c/d/i, EXTREMITIES:  extremities normal, atraumatic, no cyanosis or edema    Labs:   Recent Results (from the past 24 hour(s))   CBC WITH AUTOMATED DIFF    Collection Time: 10/02/21  7:21 AM   Result Value Ref Range    WBC 10.2 3.6 - 11.0 K/uL    RBC 4.25 3.80 - 5.20 M/uL    HGB 12.5 11.5 - 16.0 g/dL    HCT 39.3 35.0 - 47.0 %    MCV 92.5 80.0 - 99.0 FL    MCH 29.4 26.0 - 34.0 PG    MCHC 31.8 30.0 - 36.5 g/dL    RDW 15.4 (H) 11.5 - 14.5 %    PLATELET 282 616 - 698 K/uL    MPV 9.4 8.9 - 12.9 FL    NRBC 0.0 0  WBC    ABSOLUTE NRBC 0.00 0.00 - 0.01 K/uL    NEUTROPHILS 74 32 - 75 %    LYMPHOCYTES 13 12 - 49 %    MONOCYTES 13 5 - 13 %    EOSINOPHILS 0 0 - 7 %    BASOPHILS 0 0 - 1 %    IMMATURE GRANULOCYTES 0 0.0 - 0.5 %    ABS. NEUTROPHILS 7.6 1.8 - 8.0 K/UL    ABS. LYMPHOCYTES 1.3 0.8 - 3.5 K/UL    ABS. MONOCYTES 1.3 (H) 0.0 - 1.0 K/UL    ABS. EOSINOPHILS 0.0 0.0 - 0.4 K/UL    ABS. BASOPHILS 0.0 0.0 - 0.1 K/UL    ABS. IMM.  GRANS. 0.0 0.00 - 0.04 K/UL    DF AUTOMATED     METABOLIC PANEL, COMPREHENSIVE    Collection Time: 10/02/21  7:21 AM   Result Value Ref Range    Sodium 139 136 - 145 mmol/L    Potassium 3.8 3.5 - 5.1 mmol/L    Chloride 108 97 - 108 mmol/L    CO2 26 21 - 32 mmol/L    Anion gap 5 5 - 15 mmol/L    Glucose 114 (H) 65 - 100 mg/dL    BUN 19 6 - 20 MG/DL    Creatinine 0.65 0.55 - 1.02 MG/DL    BUN/Creatinine ratio 29 (H) 12 - 20      GFR est AA >60 >60 ml/min/1.73m2    GFR est non-AA >60 >60 ml/min/1.73m2    Calcium 8.7 8.5 - 10.1 MG/DL    Bilirubin, total 0.6 0.2 - 1.0 MG/DL    ALT (SGPT) 61 12 - 78 U/L    AST (SGOT) 50 (H) 15 - 37 U/L    Alk. phosphatase 73 45 - 117 U/L    Protein, total 6.8 6.4 - 8.2 g/dL    Albumin 2.9 (L) 3.5 - 5.0 g/dL    Globulin 3.9 2.0 - 4.0 g/dL    A-G Ratio 0.7 (L) 1.1 - 2.2     TROPONIN I    Collection Time: 10/02/21  7:21 AM   Result Value Ref Range    Troponin-I, Qt. <0.05 <0.05 ng/mL   PROTHROMBIN TIME + INR    Collection Time: 10/02/21  7:21 AM   Result Value Ref Range    INR 1.0 0.9 - 1.1      Prothrombin time 10.8 9.0 - 11.1 sec   PTT    Collection Time: 10/02/21  7:21 AM   Result Value Ref Range    aPTT 27.2 22.1 - 31.0 sec    aPTT, therapeutic range     58.0 - 77.0 SECS       Data Review images and reports reviewed    Assessment:     Active Problems:    Cholelithiasis (9/17/2021)        Plan/Recommendations/Medical Decision Making:     Continue present treatment   Repeat troponin at 1300. If normal, will d/c home. Doubt c/o are cardiac in nature.       Fahad Judge MD  [facility]

## 2021-10-04 LAB
ATRIAL RATE: 58 BPM
CALCULATED P AXIS, ECG09: 26 DEGREES
CALCULATED R AXIS, ECG10: -11 DEGREES
CALCULATED T AXIS, ECG11: 15 DEGREES
DIAGNOSIS, 93000: NORMAL
P-R INTERVAL, ECG05: 160 MS
Q-T INTERVAL, ECG07: 468 MS
QRS DURATION, ECG06: 76 MS
QTC CALCULATION (BEZET), ECG08: 459 MS
VENTRICULAR RATE, ECG03: 58 BPM

## 2021-10-11 ENCOUNTER — TELEPHONE (OUTPATIENT)
Dept: SURGERY | Age: 61
End: 2021-10-11

## 2021-10-11 DIAGNOSIS — K80.20 CALCULUS OF GALLBLADDER WITHOUT CHOLECYSTITIS WITHOUT OBSTRUCTION: Primary | ICD-10-CM

## 2021-10-12 RX ORDER — OXYCODONE HYDROCHLORIDE 5 MG/1
5 TABLET ORAL
Qty: 12 TABLET | Refills: 0 | Status: SHIPPED | OUTPATIENT
Start: 2021-10-12 | End: 2021-10-15

## 2021-10-20 ENCOUNTER — OFFICE VISIT (OUTPATIENT)
Dept: SURGERY | Age: 61
End: 2021-10-20
Payer: MEDICAID

## 2021-10-20 VITALS
TEMPERATURE: 97.2 F | RESPIRATION RATE: 16 BRPM | WEIGHT: 253.4 LBS | BODY MASS INDEX: 40.72 KG/M2 | HEART RATE: 57 BPM | HEIGHT: 66 IN | DIASTOLIC BLOOD PRESSURE: 76 MMHG | OXYGEN SATURATION: 97 % | SYSTOLIC BLOOD PRESSURE: 126 MMHG

## 2021-10-20 DIAGNOSIS — K80.20 CALCULUS OF GALLBLADDER WITHOUT CHOLECYSTITIS WITHOUT OBSTRUCTION: Primary | ICD-10-CM

## 2021-10-20 PROCEDURE — 99024 POSTOP FOLLOW-UP VISIT: CPT | Performed by: SURGERY

## 2021-10-20 RX ORDER — OXYCODONE HYDROCHLORIDE 5 MG/1
5 TABLET ORAL
Qty: 10 TABLET | Refills: 0 | Status: SHIPPED | OUTPATIENT
Start: 2021-10-20 | End: 2021-10-23

## 2021-10-20 NOTE — PROGRESS NOTES
Identified pt with two pt identifiers(name and ). Reviewed record in preparation for visit and have obtained necessary documentation. All patient medications has been reviewed. Chief Complaint   Patient presents with    Follow-up     S/P  Laparoscopic cholecystectomy, Intraoperative cholangiogram with intraoperative interpritation       Health Maintenance Due   Topic    Hepatitis C Screening     COVID-19 Vaccine (1)    DTaP/Tdap/Td series (1 - Tdap)    Cervical cancer screen     Colorectal Cancer Screening Combo     Shingrix Vaccine Age 50> (1 of 2)       Vitals:    10/20/21 1021   BP: 126/76   Pulse: (!) 57   Resp: 16   Temp: 97.2 °F (36.2 °C)   TempSrc: Temporal   SpO2: 97%   Weight: 114.9 kg (253 lb 6.4 oz)   Height: 5' 6\" (1.676 m)   PainSc:   8   PainLoc: Abdomen       4. Have you been to the ER, urgent care clinic since your last visit? Hospitalized since your last visit? No    5. Have you seen or consulted any other health care providers outside of the [address] [place] [address] since your last visit? Include any pap smears or colon screening. No      Patient is accompanied by self I have received verbal consent from Celso Garcia to discuss any/all medical information while they are present in the room.

## 2021-10-20 NOTE — PROGRESS NOTES
Subjective:      Mary Alice Hanna is a 63 y.o. female presents for postop care s/p lap gracie with ioc. Patient tolerating a diet. Having bowel function. No fevers. Patient reports intermittent incisional pain. Objective:     Visit Vitals  /76 (BP 1 Location: Left upper arm, BP Patient Position: Sitting, BP Cuff Size: Large adult)   Pulse (!) 57   Temp 97.2 °F (36.2 °C) (Temporal)   Resp 16   Ht 5' 6\" (1.676 m)   Wt 114.9 kg (253 lb 6.4 oz)   SpO2 97%   BMI 40.90 kg/m²       General:  alert, cooperative, no distress, appears stated age   Abdomen: soft, bowel sounds active, non-tender   Incision:   healing well, no drainage, no erythema, no hernia, no seroma, no swelling, no dehiscence, incision well approximated     Assessment:     Doing well postoperatively. Plan:     1. Continue any current medications. 2. Wound care discussed. 3. Follow up PRN  4.  Will prescribe one more refill of oxycodone then recommend tylenol    Ashley Arzate MD

## 2021-10-20 NOTE — LETTER
10/20/2021    Patient: Mary Alice Hanna   YOB: 1960   Date of Visit: 10/20/2021     MD Joe [phone]  Via Fax: [phone]    Dear Coco Chiu MD,      Thank you for referring Ms. Mary Alice Hanna to [address] for evaluation. My notes for this consultation are attached. If you have questions, please do not hesitate to call me. I look forward to following your patient along with you.       Sincerely,    Ashley Arzate MD

## 2021-12-10 ENCOUNTER — OFFICE VISIT (OUTPATIENT)
Dept: CARDIOLOGY CLINIC | Age: 61
End: 2021-12-10
Payer: MEDICAID

## 2021-12-10 VITALS
RESPIRATION RATE: 18 BRPM | HEART RATE: 64 BPM | BODY MASS INDEX: 40.82 KG/M2 | DIASTOLIC BLOOD PRESSURE: 78 MMHG | SYSTOLIC BLOOD PRESSURE: 110 MMHG | WEIGHT: 254 LBS | TEMPERATURE: 97.6 F | OXYGEN SATURATION: 98 % | HEIGHT: 66 IN

## 2021-12-10 DIAGNOSIS — I49.1 PAC (PREMATURE ATRIAL CONTRACTION): ICD-10-CM

## 2021-12-10 DIAGNOSIS — M15.8 OTHER OSTEOARTHRITIS INVOLVING MULTIPLE JOINTS: ICD-10-CM

## 2021-12-10 DIAGNOSIS — R00.2 PALPITATIONS: ICD-10-CM

## 2021-12-10 DIAGNOSIS — M79.7 FIBROMYALGIA: ICD-10-CM

## 2021-12-10 DIAGNOSIS — G47.33 OBSTRUCTIVE SLEEP APNEA (ADULT) (PEDIATRIC): ICD-10-CM

## 2021-12-10 DIAGNOSIS — I10 ESSENTIAL HYPERTENSION: Primary | ICD-10-CM

## 2021-12-10 DIAGNOSIS — Z90.49 S/P CHOLECYSTECTOMY: ICD-10-CM

## 2021-12-10 PROCEDURE — 99214 OFFICE O/P EST MOD 30 MIN: CPT | Performed by: INTERNAL MEDICINE

## 2021-12-10 NOTE — PROGRESS NOTES
Identified pt with two pt identifiers(name and ). Reviewed record in preparation for visit and have obtained necessary documentation. Chief Complaint   Patient presents with    Irregular Heart Beat     Follow up    Hypertension      Vitals:    12/10/21 0945   BP: 110/78   Pulse: 64   Resp: 18   Temp: 97.6 °F (36.4 °C)   TempSrc: Temporal   SpO2: 98%   Weight: 254 lb (115.2 kg)   Height: 5' 6\" (1.676 m)   PainSc:   8   PainLoc: Knee       Medications reviewed/approved by Dr. Ailyn Pike. Health Maintenance Review: Patient reminded of \"due or due soon\" health maintenance. I have asked the patient to contact his/her primary care provider (PCP) for follow-up on his/her health maintenance. Coordination of Care Questionnaire:  :   1) Have you been to an emergency room, urgent care, or hospitalized since your last visit? If yes, where when, and reason for visit? Yes, ER 2021 for chest pain, admission ED [facility] 10/2021 for gallbladder. 2. Have seen or consulted any other health care provider since your last visit? If yes, where when, and reason for visit? YES - Dr. Chrisandra Merlin pcp for r/c. Patient is accompanied by ELAINE duenas 97.6 F I have received verbal consent from [facility] to discuss any/all medical information while they are present in the room.

## 2021-12-10 NOTE — PROGRESS NOTES
Rosie Baird is a 57 y.o. female is here for routine f/u. Hx hypertension, BURTON (CPAP), migraine headaches, DJD, fibromyalgia,obesity. followed at [facility]. Recent sx of palpitations/tachycardia--dx'd afib by Dr Amol Lawler (not clearly documented)--sent here for evaluation and Holter and see in 4/21. Elana Flores Echo 4/14/21 with normal chambers, valves, function--LVEF 65-70, normal RVSP. No hx DM, TIA/CVA/vascular disease, other. Holter monitor 5/21 negative--no arrhythmias seen. On amlodipine + HCTZ for hypertension. Hx ACEI allergy with angioedema. S/p recent cholecystectomy for symptomatic GB disease, seen in fu by PCP. Occasional palpitations, no prolonged episodes (is not on [address])  The patient denies chest pain/ shortness of breath, orthopnea, PND, LE edema, syncope, presyncope or fatigue. Patient Active Problem List    Diagnosis Date Noted    Cholelithiasis 09/17/2021    Essential hypertension 04/20/2021    Paroxysmal atrial fibrillation ([address] 04/20/2021    BURTON (obstructive sleep apnea) 04/20/2021    Class 3 severe obesity due to excess calories without serious comorbidity in adult [facility]) 04/20/2021    Fibromyalgia     Migraine     DJD (degenerative joint disease)       Holly Wilson MD  Past Medical History:   Diagnosis Date    AF (paroxysmal atrial fibrillation) ([address] 04/2021    Asthma     DJD (degenerative joint disease)     Fibromyalgia     GERD (gastroesophageal reflux disease)     Hypertension     Ill-defined condition     cholestrol    Menopause     age 38    Migraine     Rheumatoid arthritis ([address]     Sleep disorder     sleep apnea uses CPAP at night      Past Surgical History:   Procedure Laterality Date    HX CATARACT REMOVAL Right 09/05/2020    HX HEENT  2021    strabismus correction     Allergies   Allergen Reactions    Ace Inhibitors Angioedema    Aspirin Other (comments)    Other Medication Rash     Rash reaction to wipes used for cleansing prior to eye surgery.  Penicillins Hives    Sulfa (Sulfonamide Antibiotics) Unable to Obtain      Family History   Problem Relation Age of Onset    Diabetes Mother     Hypertension Sister       Social History     Socioeconomic History    Marital status:      Spouse name: Not on file    Number of children: Not on file    Years of education: Not on file    Highest education level: Not on file   Occupational History    Not on file   Tobacco Use    Smoking status: Former Smoker     Packs/day: 0.25     Quit date:      Years since quittin.9    Smokeless tobacco: Never Used    Tobacco comment: social smoker   Vaping Use    Vaping Use: Never used   Substance and Sexual Activity    Alcohol use: Not Currently     Alcohol/week: 2.0 standard drinks     Types: 2 Glasses of wine per week     Comment: socially    Drug use: No    Sexual activity: Never   Other Topics Concern    Not on file   Social History Narrative    Not on file     Social Determinants of Health     Financial Resource Strain:     Difficulty of Paying Living Expenses: Not on file   Food Insecurity:     Worried About [address] in the Last Year: Not on file    Dave of Food in the Last Year: Not on file   Transportation Needs:     Lack of Transportation (Medical): Not on file    Lack of Transportation (Non-Medical):  Not on file   Physical Activity:     Days of Exercise per Week: Not on file    Minutes of Exercise per Session: Not on file   Stress:     Feeling of Stress : Not on file   Social Connections:     Frequency of Communication with Friends and Family: Not on file    Frequency of Social Gatherings with Friends and Family: Not on file    Attends Anglican Services: Not on file    Active Member of Clubs or Organizations: Not on file    Attends Club or Organization Meetings: Not on file    Marital Status: Not on file   Intimate Partner Violence:     Fear of Current or Ex-Partner: Not on file    Emotionally Abused: Not on file    Physically Abused: Not on file    Sexually Abused: Not on file   Housing Stability:     Unable to Pay for Housing in the Last Year: Not on file    Number of Places Lived in the Last Year: Not on file    Unstable Housing in the Last Year: Not on file      Current Outpatient Medications   Medication Sig    ibuprofen (MOTRIN) 600 mg tablet Take 1 Tablet by mouth every six (6) hours as needed for Pain.  meloxicam (MOBIC) 7.5 mg tablet Take 7.5 mg by mouth daily.  Omeprazole delayed release (PRILOSEC D/R) 20 mg tablet Take 20 mg by mouth daily.  atorvastatin (LIPITOR) 20 mg tablet Take 20 mg by mouth daily.  divalproex DR (Depakote) 250 mg tablet Take 250 mg by mouth three (3) times daily as needed.  gabapentin (NEURONTIN) 300 mg capsule Take 300 mg by mouth nightly.  topiramate (TOPAMAX) 50 mg tablet Take 50 mg by mouth nightly.  metoprolol tartrate (LOPRESSOR) 25 mg tablet TAKE 1 TABLET BY MOUTH TWICE DAILY FOR BLOOD PRESSURE AND HEART PALPITATIONS    albuterol (PROVENTIL HFA, VENTOLIN HFA, PROAIR HFA) 90 mcg/actuation inhaler Take 2 Puffs by inhalation every four (4) hours as needed for Wheezing.  beclomethasone (BECONASE AQ) 42 mcg (0.042 %) nasal spray 2 Puffs by Both Nostrils route two (2) times a day. Indications: inflammation of the nose due to an allergy    FLUoxetine (PROzac) 40 mg capsule TAKE 1 CAPSULE BY MOUTH ONCE DAILY    oxybutynin (DITROPAN) 5 mg tablet daily.  cetirizine (ZYRTEC) 10 mg tablet Take 10 mg by mouth. No current facility-administered medications for this visit. Review of Symptoms:    CONST  No weight change. No fever, chills, sweats    ENT No visual changes, URI sx, sore throat    CV  See HPI   RESP  No cough, or sputum, wheezing. Also see HPI   GI  No abdominal pain or change in bowel habits. No heartburn or dysphagia. No melena or rectal bleeding.   No dysuria, urgency, frequency, hematuria   MSKEL  No joint pain, swelling. No muscle pain. SKIN  No rash or lesions. NEURO  No headache, syncope, or seizure. No weakness, loss of sensation, or paresthesias. PSYCH  No low mood or depression  No anxiety. HE/LYMPH  No easy bruising, abnormal bleeding, or enlarged glands. Physical ExamPhysical Exam:    Visit Vitals  /78 (BP 1 Location: Left upper arm, BP Patient Position: Sitting, BP Cuff Size: Large adult)   Pulse 64   Temp 97.6 °F (36.4 °C) (Temporal)   Resp 18   Ht 5' 6\" (1.676 m)   Wt 254 lb (115.2 kg)   SpO2 98% Comment: ra   BMI 41.00 kg/m²     Gen: NAD  HEENT:  PERRL, throat clear  Neck: no adenopathy, no thyromegaly, no JVD   Heart:  Regular,Nl S1S2,  no murmur, gallop or rub. Lungs:  clear  Abdomen:   Soft, non-tender, bowel sounds are active.    Extremities:  No edema  Pulse: symmetric  Neuro: A&O times 3, No focal neuro deficits    Cardiographics    ECG: from 10/2/21--NSR with sinus arrhyhtmia, otherwise normal.  Prior EKG 7/21 with [facility] PAC's    Labs:   Lab Results   Component Value Date/Time    Sodium 139 10/02/2021 07:21 AM    Sodium 142 08/31/2021 07:33 PM    Sodium 139 07/26/2021 05:25 PM    Sodium 140 09/07/2018 08:31 AM    Potassium 3.8 10/02/2021 07:21 AM    Potassium 3.5 08/31/2021 07:33 PM    Potassium 3.4 (L) 07/26/2021 05:25 PM    Potassium 3.8 09/07/2018 08:31 AM    Chloride 108 10/02/2021 07:21 AM    Chloride 104 08/31/2021 07:33 PM    Chloride 104 07/26/2021 05:25 PM    Chloride 103 09/07/2018 08:31 AM    CO2 26 10/02/2021 07:21 AM    CO2 31 08/31/2021 07:33 PM    CO2 26 07/26/2021 05:25 PM    CO2 28 09/07/2018 08:31 AM    Anion gap 5 10/02/2021 07:21 AM    Anion gap 7 08/31/2021 07:33 PM    Anion gap 9 07/26/2021 05:25 PM    Anion gap 9 09/07/2018 08:31 AM    Glucose 114 (H) 10/02/2021 07:21 AM    Glucose 72 08/31/2021 07:33 PM    Glucose 116 (H) 07/26/2021 05:25 PM    Glucose 87 09/07/2018 08:31 AM    BUN 19 10/02/2021 07:21 AM    BUN 17 08/31/2021 07:33 PM    BUN 15 07/26/2021 05:25 PM BUN 19 09/07/2018 08:31 AM    Creatinine 0.65 10/02/2021 07:21 AM    Creatinine 0.67 08/31/2021 07:33 PM    Creatinine 0.79 07/26/2021 05:25 PM    Creatinine 0.73 09/07/2018 08:31 AM    BUN/Creatinine ratio 29 (H) 10/02/2021 07:21 AM    BUN/Creatinine ratio 25 (H) 08/31/2021 07:33 PM    BUN/Creatinine ratio 19 07/26/2021 05:25 PM    BUN/Creatinine ratio 26 (H) 09/07/2018 08:31 AM    GFR est AA >60 10/02/2021 07:21 AM    GFR est AA >60 08/31/2021 07:33 PM    GFR est AA >60 07/26/2021 05:25 PM    GFR est AA >60 09/07/2018 08:31 AM    GFR est non-AA >60 10/02/2021 07:21 AM    GFR est non-AA >60 08/31/2021 07:33 PM    GFR est non-AA >60 07/26/2021 05:25 PM    GFR est non-AA >60 09/07/2018 08:31 AM    Calcium 8.7 10/02/2021 07:21 AM    Calcium 9.3 08/31/2021 07:33 PM    Calcium 9.0 07/26/2021 05:25 PM    Calcium 9.6 09/07/2018 08:31 AM    Bilirubin, total 0.6 10/02/2021 07:21 AM    Bilirubin, total 0.9 07/28/2021 01:25 PM    Bilirubin, total 1.2 (H) 07/26/2021 05:25 PM    Bilirubin, total 0.3 04/01/2020 10:31 AM    Bilirubin, total 0.3 09/07/2018 08:31 AM    Alk. phosphatase 73 10/02/2021 07:21 AM    Alk. phosphatase 72 07/26/2021 05:25 PM    Alk. phosphatase 72 04/01/2020 10:31 AM    Alk.  phosphatase 84 09/07/2018 08:31 AM    Protein, total 6.8 10/02/2021 07:21 AM    Protein, total 7.4 07/26/2021 05:25 PM    Protein, total 7.0 09/07/2018 08:31 AM    Albumin 2.9 (L) 10/02/2021 07:21 AM    Albumin 3.3 (L) 07/26/2021 05:25 PM    Albumin 3.7 09/07/2018 08:31 AM    Globulin 3.9 10/02/2021 07:21 AM    Globulin 4.1 (H) 07/26/2021 05:25 PM    Globulin 3.3 09/07/2018 08:31 AM    A-G Ratio 0.7 (L) 10/02/2021 07:21 AM    A-G Ratio 0.8 (L) 07/26/2021 05:25 PM    A-G Ratio 1.1 09/07/2018 08:31 AM    ALT (SGPT) 61 10/02/2021 07:21 AM    ALT (SGPT) 26 07/28/2021 01:25 PM    ALT (SGPT) 26 07/26/2021 05:25 PM    ALT (SGPT) 27 05/20/2020 12:40 PM    ALT (SGPT) 25 04/01/2020 10:31 AM     No results found for: CPK, CPKX, CPX  Lab Results   Component Value Date/Time    Cholesterol, total 198 03/24/2021 12:57 PM    Cholesterol, total 252 (H) 10/14/2020 04:51 PM    Cholesterol, total 188 09/07/2018 08:31 AM    HDL Cholesterol 72 03/24/2021 12:57 PM    HDL Cholesterol 77 10/14/2020 04:51 PM    HDL Cholesterol 67 09/07/2018 08:31 AM    LDL, calculated 118 (H) 03/24/2021 12:57 PM    LDL, calculated 164.4 (H) 10/14/2020 04:51 PM    LDL, calculated 115.8 (H) 09/07/2018 08:31 AM    Triglyceride 40 03/24/2021 12:57 PM    Triglyceride 53 10/14/2020 04:51 PM    Triglyceride 26 09/07/2018 08:31 AM    CHOL/HDL Ratio 2.8 03/24/2021 12:57 PM    CHOL/HDL Ratio 3.3 10/14/2020 04:51 PM    CHOL/HDL Ratio 2.8 09/07/2018 08:31 AM     No results found for this or any previous visit. Assessment:         Patient Active Problem List    Diagnosis Date Noted    Cholelithiasis 09/17/2021    Essential hypertension 04/20/2021    Paroxysmal atrial fibrillation ([address] 04/20/2021    BURTON (obstructive sleep apnea) 04/20/2021    Class 3 severe obesity due to excess calories without serious comorbidity in [facility]) 04/20/2021    Fibromyalgia     Migraine     DJD (degenerative joint disease)       Hx hypertension, BURTON (CPAP), migraine headaches, DJD, fibromyalgia,obesity. followed at [facility]. Recent sx of palpitations/tachycardia--dx'd afib by Dr Kirsten Kowlaski (not clearly documented)--sent here for evaluation and Holter and see in 4/21. Verito Lindsey Echo 4/14/21 with normal chambers, valves, function--LVEF 65-70, normal RVSP. No hx DM, TIA/CVA/vascular disease, other. Holter monitor 5/21 negative--no arrhythmias seen. On amlodipine + HCTZ for hypertension. Hx ACEI allergy with angioedema. S/p recent cholecystectomy for symptomatic GB disease, seen in fu by PCP. Occasional palpitations, no prolonged episodes (is not on [facility])      Plan:     Doing well with no adverse cardiac symptoms. BP, Lipids and labs followed by PCP.    Continue current care--can fu here on prn basis at this point    Harmony No MD

## 2021-12-29 ENCOUNTER — HOSPITAL ENCOUNTER (OUTPATIENT)
Dept: LAB | Age: 61
Discharge: HOME | End: 2021-12-29

## 2021-12-29 PROCEDURE — 80053 COMPREHEN METABOLIC PANEL: CPT

## 2021-12-29 PROCEDURE — 80061 LIPID PANEL: CPT

## 2021-12-29 PROCEDURE — 82306 VITAMIN D 25 HYDROXY: CPT

## 2021-12-29 PROCEDURE — 84443 ASSAY THYROID STIM HORMONE: CPT

## 2021-12-29 PROCEDURE — 85025 COMPLETE CBC W/AUTO DIFF WBC: CPT

## 2021-12-29 PROCEDURE — 83036 HEMOGLOBIN GLYCOSYLATED A1C: CPT

## 2021-12-30 LAB
25(OH)D3 SERPL-MCNC: 19.9 NG/ML (ref 30–100)
ALBUMIN SERPL-MCNC: 3.7 G/DL (ref 3.5–5)
ALBUMIN/GLOB SERPL: 1.2 {RATIO} (ref 1.1–2.2)
ALP SERPL-CCNC: 83 U/L (ref 45–117)
ALT SERPL-CCNC: 25 U/L (ref 12–78)
ANION GAP SERPL CALC-SCNC: 11 MMOL/L (ref 5–15)
AST SERPL-CCNC: 18 U/L (ref 15–37)
BASOPHILS # BLD: 0 K/UL (ref 0–0.1)
BASOPHILS NFR BLD: 1 % (ref 0–1)
BILIRUB SERPL-MCNC: 0.4 MG/DL (ref 0.2–1)
BUN SERPL-MCNC: 19 MG/DL (ref 6–20)
BUN/CREAT SERPL: 28 (ref 12–20)
CALCIUM SERPL-MCNC: 8.8 MG/DL (ref 8.5–10.1)
CHLORIDE SERPL-SCNC: 104 MMOL/L (ref 97–108)
CHOLEST SERPL-MCNC: 183 MG/DL
CO2 SERPL-SCNC: 24 MMOL/L (ref 21–32)
CREAT SERPL-MCNC: 0.69 MG/DL (ref 0.55–1.02)
DIFFERENTIAL METHOD BLD: ABNORMAL
EOSINOPHIL # BLD: 0.3 K/UL (ref 0–0.4)
EOSINOPHIL NFR BLD: 5 % (ref 0–7)
ERYTHROCYTE [DISTWIDTH] IN BLOOD BY AUTOMATED COUNT: 14.6 % (ref 11.5–14.5)
EST. AVERAGE GLUCOSE BLD GHB EST-MCNC: 120 MG/DL
GLOBULIN SER CALC-MCNC: 3.2 G/DL (ref 2–4)
GLUCOSE SERPL-MCNC: 91 MG/DL (ref 65–100)
HBA1C MFR BLD: 5.8 % (ref 4–5.6)
HCT VFR BLD AUTO: 42.4 % (ref 35–47)
HDLC SERPL-MCNC: 75 MG/DL
HDLC SERPL: 2.4 {RATIO} (ref 0–5)
HGB BLD-MCNC: 13.4 G/DL (ref 11.5–16)
IMM GRANULOCYTES # BLD AUTO: 0 K/UL (ref 0–0.04)
IMM GRANULOCYTES NFR BLD AUTO: 0 % (ref 0–0.5)
LDLC SERPL CALC-MCNC: 96 MG/DL (ref 0–100)
LYMPHOCYTES # BLD: 2.7 K/UL (ref 0.8–3.5)
LYMPHOCYTES NFR BLD: 42 % (ref 12–49)
MCH RBC QN AUTO: 28.8 PG (ref 26–34)
MCHC RBC AUTO-ENTMCNC: 31.6 G/DL (ref 30–36.5)
MCV RBC AUTO: 91.2 FL (ref 80–99)
MONOCYTES # BLD: 0.7 K/UL (ref 0–1)
MONOCYTES NFR BLD: 11 % (ref 5–13)
NEUTS SEG # BLD: 2.6 K/UL (ref 1.8–8)
NEUTS SEG NFR BLD: 41 % (ref 32–75)
NRBC # BLD: 0 K/UL (ref 0–0.01)
NRBC BLD-RTO: 0 PER 100 WBC
PLATELET # BLD AUTO: 271 K/UL (ref 150–400)
PMV BLD AUTO: 10.1 FL (ref 8.9–12.9)
POTASSIUM SERPL-SCNC: 4.4 MMOL/L (ref 3.5–5.1)
PROT SERPL-MCNC: 6.9 G/DL (ref 6.4–8.2)
RBC # BLD AUTO: 4.65 M/UL (ref 3.8–5.2)
SODIUM SERPL-SCNC: 139 MMOL/L (ref 136–145)
TRIGL SERPL-MCNC: 60 MG/DL (ref ?–150)
TSH SERPL DL<=0.05 MIU/L-ACNC: 0.71 UIU/ML (ref 0.36–3.74)
VLDLC SERPL CALC-MCNC: 12 MG/DL
WBC # BLD AUTO: 6.4 K/UL (ref 3.6–11)

## 2022-03-18 PROBLEM — I48.0 PAROXYSMAL ATRIAL FIBRILLATION (HCC): Status: ACTIVE | Noted: 2021-04-20

## 2022-03-18 PROBLEM — K80.20 CHOLELITHIASIS: Status: ACTIVE | Noted: 2021-09-17

## 2022-03-19 PROBLEM — E66.813 CLASS 3 SEVERE OBESITY DUE TO EXCESS CALORIES WITHOUT SERIOUS COMORBIDITY IN ADULT: Status: ACTIVE | Noted: 2021-04-20

## 2022-03-19 PROBLEM — G47.33 OSA (OBSTRUCTIVE SLEEP APNEA): Status: ACTIVE | Noted: 2021-04-20

## 2022-03-19 PROBLEM — E66.01 CLASS 3 SEVERE OBESITY DUE TO EXCESS CALORIES WITHOUT SERIOUS COMORBIDITY IN ADULT (HCC): Status: ACTIVE | Noted: 2021-04-20

## 2022-03-20 PROBLEM — I10 ESSENTIAL HYPERTENSION: Status: ACTIVE | Noted: 2021-04-20

## 2022-10-22 ENCOUNTER — HOSPITAL ENCOUNTER (EMERGENCY)
Age: 62
Discharge: HOME | End: 2022-10-22
Attending: EMERGENCY MEDICINE
Payer: MEDICAID

## 2022-10-22 ENCOUNTER — APPOINTMENT (OUTPATIENT)
Dept: GENERAL RADIOLOGY | Age: 62
End: 2022-10-22
Attending: EMERGENCY MEDICINE
Payer: MEDICAID

## 2022-10-22 VITALS
RESPIRATION RATE: 16 BRPM | TEMPERATURE: 98.7 F | HEART RATE: 68 BPM | SYSTOLIC BLOOD PRESSURE: 140 MMHG | DIASTOLIC BLOOD PRESSURE: 83 MMHG | OXYGEN SATURATION: 98 %

## 2022-10-22 DIAGNOSIS — J06.9 VIRAL URI WITH COUGH: Primary | ICD-10-CM

## 2022-10-22 PROCEDURE — 99283 EMERGENCY DEPT VISIT LOW MDM: CPT

## 2022-10-22 PROCEDURE — 71045 X-RAY EXAM CHEST 1 VIEW: CPT

## 2022-10-24 NOTE — ED PROVIDER NOTES
EMERGENCY DEPARTMENT HISTORY AND PHYSICAL EXAM      Date: 10/22/2022  Patient Name: Destiny Dillard    History of Presenting Illness     No chief complaint on file. History Provided By: Patient    HPI: Destiny Dillard, 57 y.o. female with PMHx significant for hypertension, asthma, fibromyalgia, paroxysmal A. fib, rheumatoid arthritis, presents ambulatory to the ED with cc of cough and sore throat. Cough is nonproductive. Tolerating p.o. without difficulty. No nausea vomiting or diarrhea. No chest pain or shortness of breath. No wheezing. No known sick contacts. No fevers or chills. PMHx: Significant for hypertension, asthma, fibromyalgia, paroxysmal A. fib, rheumatoid arthritis  PSHx: Si cataract gnificant for cataract surgery  Social Hx: Former quarter pack a day smoker. Quit in 2010. Drinks alcohol socially. There are no other complaints, changes, or physical findings at this time. PCP: Cam Anthony MD    No current facility-administered medications on file prior to encounter. Current Outpatient Medications on File Prior to Encounter   Medication Sig Dispense Refill    ibuprofen (MOTRIN) 600 mg tablet Take 1 Tablet by mouth every six (6) hours as needed for Pain. 30 Tablet 1    meloxicam (MOBIC) 7.5 mg tablet Take 7.5 mg by mouth daily. Omeprazole delayed release (PRILOSEC D/R) 20 mg tablet Take 20 mg by mouth daily. atorvastatin (LIPITOR) 20 mg tablet Take 20 mg by mouth daily. divalproex DR (Depakote) 250 mg tablet Take 250 mg by mouth three (3) times daily as needed. gabapentin (NEURONTIN) 300 mg capsule Take 300 mg by mouth nightly. topiramate (TOPAMAX) 50 mg tablet Take 50 mg by mouth nightly.       metoprolol tartrate (LOPRESSOR) 25 mg tablet TAKE 1 TABLET BY MOUTH TWICE DAILY FOR BLOOD PRESSURE AND HEART PALPITATIONS      albuterol (PROVENTIL HFA, VENTOLIN HFA, PROAIR HFA) 90 mcg/actuation inhaler Take 2 Puffs by inhalation every four (4) hours as needed for Wheezing. beclomethasone (BECONASE AQ) 42 mcg (0.042 %) nasal spray 2 Puffs by Both Nostrils route two (2) times a day. Indications: inflammation of the nose due to an allergy      FLUoxetine (PROzac) 40 mg capsule TAKE 1 CAPSULE BY MOUTH ONCE DAILY      oxybutynin (DITROPAN) 5 mg tablet daily. cetirizine (ZYRTEC) 10 mg tablet Take 10 mg by mouth. Past History     Past Medical History:  Past Medical History:   Diagnosis Date    AF (paroxysmal atrial fibrillation) ([facility] 2021    Asthma     DJD (degenerative joint disease)     Fibromyalgia     GERD (gastroesophageal reflux disease)     Hypertension     Ill-defined condition     cholestrol    Menopause     age 38    Migraine     Rheumatoid arthritis ([facility]     Sleep disorder     sleep apnea uses CPAP at night       Past Surgical History:  Past Surgical History:   Procedure Laterality Date    HX CATARACT REMOVAL Right 2020    HX HEENT      strabismus correction       Family History:  Family History   Problem Relation Age of Onset    Diabetes Mother     Hypertension Sister        Social History:  Social History     Tobacco Use    Smoking status: Former     Packs/day: 0.25     Types: Cigarettes     Quit date:      Years since quittin.8    Smokeless tobacco: Never    Tobacco comments:     social smoker   Vaping Use    Vaping Use: Never used   Substance Use Topics    Alcohol use: Not Currently     Alcohol/week: 2.0 standard drinks     Types: 2 Glasses of wine per week     Comment: socially    Drug use: No       Allergies: Allergies   Allergen Reactions    Ace Inhibitors Angioedema    Aspirin Other (comments)    Other Medication Rash     Rash reaction to wipes used for cleansing prior to eye surgery. Penicillins Hives    Sulfa (Sulfonamide Antibiotics) Unable to Obtain         Review of Systems   Review of Systems   Constitutional:  Positive for fatigue. Negative for activity change, chills and fever.    HENT:  Positive for sore throat. Negative for congestion. Eyes:  Negative for pain and redness. Respiratory:  Positive for cough. Negative for chest tightness and shortness of breath. Cardiovascular:  Negative for chest pain and palpitations. Gastrointestinal:  Negative for abdominal pain, diarrhea, nausea and vomiting. Genitourinary:  Negative for dysuria, frequency and urgency. Musculoskeletal:  Negative for back pain and neck pain. Skin:  Negative for rash. Neurological:  Negative for syncope, light-headedness and headaches. Psychiatric/Behavioral:  Negative for confusion. All other systems reviewed and are negative. Physical Exam   Physical Exam  Vitals and nursing note reviewed. Constitutional:       General: She is not in acute distress. Appearance: She is well-developed. She is not diaphoretic. HENT:      Head: Normocephalic. Nose: Nose normal.      Mouth/Throat:      Mouth: Mucous membranes are moist.      Pharynx: Oropharynx is clear. No oropharyngeal exudate. Comments: No pharyngeal erythema or exudate. Eyes:      General: No scleral icterus. Conjunctiva/sclera: Conjunctivae normal.      Pupils: Pupils are equal, round, and reactive to light. Neck:      Thyroid: No thyromegaly. Vascular: No JVD. Trachea: No tracheal deviation. Cardiovascular:      Rate and Rhythm: Normal rate and regular rhythm. Heart sounds: No murmur heard. No friction rub. No gallop. Pulmonary:      Effort: Pulmonary effort is normal. No respiratory distress. Breath sounds: Normal breath sounds. No stridor. No wheezing or rales. Abdominal:      General: Bowel sounds are normal. There is no distension. Palpations: Abdomen is soft. Tenderness: There is no abdominal tenderness. There is no guarding or rebound. Musculoskeletal:         General: Normal range of motion. Cervical back: Normal range of motion and neck supple.    Lymphadenopathy:      Cervical: No cervical adenopathy. Skin:     General: Skin is warm and dry. Findings: No erythema or rash. Neurological:      Mental Status: She is alert and oriented to person, place, and time. Cranial Nerves: No cranial nerve deficit. Motor: No abnormal muscle tone. Coordination: Coordination normal.   Psychiatric:         Behavior: Behavior normal.           Diagnostic Study Results     Labs -   No results found for this or any previous visit (from the past 12 hour(s)). Radiologic Studies -   XR CHEST SNGL V   Final Result   No Acute Disease. CT Results  (Last 48 hours)      None          CXR Results  (Last 48 hours)                 10/22/22 1436  XR CHEST SNGL V Final result    Impression:  No Acute Disease. Narrative:  EXAM: Portable CXR. 1426 hours. INDICATION: Chest Pain       FINDINGS:   The lungs appear clear. Heart is normal in size. There is no pulmonary edema. There is no evident pneumothorax or pleural effusion. Medical Decision Making   I am the first provider for this patient. I reviewed the vital signs, available nursing notes, past medical history, past surgical history, family history and social history. Vital Signs-Reviewed the patient's vital signs. No data found. Records Reviewed: Nursing notes reviewed    Provider Notes (Medical Decision Making):   DDX: Viral URI, strep pharyngitis, pneumonia, viral pharyngitis. ED Course:   Initial assessment performed. The patients presenting problems have been discussed, and they are in agreement with the care plan formulated and outlined with them. I have encouraged them to ask questions as they arise throughout their visit. PROGRESS NOTE    Pt reevaluated. Chest x-ray clear. Good room air sats. Suspect viral URI. Discharge home. Recommended outpatient home COVID testing if desired. Patient afebrile nontoxic.   Written by Dari Georges MD     Progress note:    Pt noted to be feeling better and ready for discharge. Updated pt and/or family on all final lab and/or  imaging findings. Will follow up as instructed. All questions have been answered, pt voiced understanding and agreement with plan. Specific return precautions provided as well as instructions to return to the ED should sx worsen at any time. Vital signs stable for discharge. I have also put together some discharge instructions for them that include: 1) educational information regarding their diagnosis, 2) how to care for their diagnosis at home, as well a 3) list of reasons why they would want to return to the ED prior to their follow-up appointment, should their condition change. Written by Dari Georges MD        Critical Care Time:   0    Disposition:  Discharge    PLAN:  1. Discharge Medication List as of 10/22/2022  3:08 PM        2. Follow-up Information       Follow up With Specialties Details Why Contact Tamir Wise MD Internal Medicine Physician Schedule an appointment as soon as possible for a visit in 1 week  [address]  [address] [facility]  [phone]            Return to ED if worse     Diagnosis     Clinical Impression:   1. Viral URI with cough              Please note that this dictation was completed with [employer], the computer voice recognition software. Quite often unanticipated grammatical, syntax, homophones, and other interpretive errors are inadvertently transcribed by the computer software. Please disregard these errors. Please excuse any errors that have escaped final proofreading.

## 2022-10-31 ENCOUNTER — APPOINTMENT (OUTPATIENT)
Dept: GENERAL RADIOLOGY | Age: 62
End: 2022-10-31
Attending: FAMILY MEDICINE
Payer: MEDICAID

## 2022-10-31 ENCOUNTER — HOSPITAL ENCOUNTER (EMERGENCY)
Age: 62
Discharge: HOME | End: 2022-10-31
Attending: FAMILY MEDICINE
Payer: MEDICAID

## 2022-10-31 VITALS
HEART RATE: 73 BPM | OXYGEN SATURATION: 98 % | DIASTOLIC BLOOD PRESSURE: 87 MMHG | TEMPERATURE: 98.1 F | RESPIRATION RATE: 17 BRPM | SYSTOLIC BLOOD PRESSURE: 155 MMHG

## 2022-10-31 DIAGNOSIS — J18.9 COMMUNITY ACQUIRED PNEUMONIA OF LEFT LOWER LOBE OF LUNG: Primary | ICD-10-CM

## 2022-10-31 DIAGNOSIS — I10 HYPERTENSION, UNSPECIFIED TYPE: ICD-10-CM

## 2022-10-31 LAB
FLUAV AG NPH QL IA: NEGATIVE
FLUBV AG NOSE QL IA: NEGATIVE

## 2022-10-31 PROCEDURE — 74011250637 HC RX REV CODE- 250/637: Performed by: FAMILY MEDICINE

## 2022-10-31 PROCEDURE — 99284 EMERGENCY DEPT VISIT MOD MDM: CPT

## 2022-10-31 PROCEDURE — U0005 INFEC AGEN DETEC AMPLI PROBE: HCPCS

## 2022-10-31 PROCEDURE — 71046 X-RAY EXAM CHEST 2 VIEWS: CPT

## 2022-10-31 PROCEDURE — 87804 INFLUENZA ASSAY W/OPTIC: CPT

## 2022-10-31 RX ORDER — DOXYCYCLINE HYCLATE 100 MG
100 TABLET ORAL 2 TIMES DAILY
Qty: 14 TABLET | Refills: 0 | Status: SHIPPED | OUTPATIENT
Start: 2022-10-31 | End: 2022-11-07

## 2022-10-31 RX ORDER — DOXYCYCLINE 100 MG/1
100 CAPSULE ORAL
Status: COMPLETED | OUTPATIENT
Start: 2022-10-31 | End: 2022-10-31

## 2022-10-31 RX ADMIN — DOXYCYCLINE 100 MG: 100 CAPSULE ORAL at 21:25

## 2022-10-31 NOTE — Clinical Note
[address] [place] [address] EMERGENCY DEP  [address] [facility]   [place] [address]  [phone]    Work/School Note    Date: 10/31/2022    To Whom It May concern:    Ladarius Baltazar was seen and treated today in the emergency room by the following provider(s):  Attending Provider: Katherin Logan MD.      Ladarius Baltazar is excused from work/school on 10/31/2022 through 11/2/2022. She is medically clear to return to work/school on 11/3/2022.          Sincerely,          Gil Jeffrey MD

## 2022-10-31 NOTE — ED TRIAGE NOTES
Pt arrived POV with c/o a productive cough x 1 week. Pt states her daughter just got over having covid and she is concerned she might have it as well. Pt alert and oriented x 4 and able to answer all questions. Pt reports taking morning dose of BP medication but has not taken evening dose.

## 2022-10-31 NOTE — Clinical Note
[address] [place] [address] EMERGENCY DEP  [address] [facility]   [place]  [id number]  [phone]    Work/School Note    Date: 10/31/2022    To Whom It May concern:    Destiny Dillard was seen and treated today in the emergency room by the following provider(s):  Attending Provider: Magdalena Shultz MD.      Destiny Dillard is excused from work/school on 10/31/22 and 11/01/22. She is medically clear to return to work/school on 11/2/2022.        Sincerely,          Shaneka Lombardo MD

## 2022-11-01 NOTE — ED PROVIDER NOTES
EMERGENCY DEPARTMENT HISTORY AND PHYSICAL EXAM          Date: 10/31/2022  Patient Name: Jerry Hopkins    History of Presenting Illness     Chief Complaint   Patient presents with    Cough       History Provided By: Patient and Patient's Daughter    HPI: Jerry Hopkins is a 57 y.o. female, pmhx below, who presents to the ED c/o a productive cough that she has had for several days. She was seen in the ED a week ago and her CXR was negative at that time. She denies any fevers or chills, but her energy level has been diminished somewhat. No headaches or myalgias. She has noticed that the cough has gotten worse over the last few days. She thinks that she has taken her metoprolol and other medications as prescribed. Her evening dose she normally takes just before bed. PCP: Hank Begum MD    Allergies: See list  Social Hx: No tobacco, vaping, 2 glasses wine/wk, Illicit Drugs; Lives locally c daughter     There are no other complaints, changes, or physical findings at this time. Current Outpatient Medications   Medication Sig Dispense Refill    doxycycline (VIBRA-TABS) 100 mg tablet Take 1 Tablet by mouth two (2) times a day for 7 days. 14 Tablet 0    ibuprofen (MOTRIN) 600 mg tablet Take 1 Tablet by mouth every six (6) hours as needed for Pain. 30 Tablet 1    meloxicam (MOBIC) 7.5 mg tablet Take 7.5 mg by mouth daily. Omeprazole delayed release (PRILOSEC D/R) 20 mg tablet Take 20 mg by mouth daily. atorvastatin (LIPITOR) 20 mg tablet Take 20 mg by mouth daily. divalproex DR (Depakote) 250 mg tablet Take 250 mg by mouth three (3) times daily as needed. gabapentin (NEURONTIN) 300 mg capsule Take 300 mg by mouth nightly. topiramate (TOPAMAX) 50 mg tablet Take 50 mg by mouth nightly.       metoprolol tartrate (LOPRESSOR) 25 mg tablet TAKE 1 TABLET BY MOUTH TWICE DAILY FOR BLOOD PRESSURE AND HEART PALPITATIONS      albuterol (PROVENTIL HFA, VENTOLIN HFA, PROAIR HFA) 90 mcg/actuation inhaler Take 2 Puffs by inhalation every four (4) hours as needed for Wheezing. beclomethasone (BECONASE AQ) 42 mcg (0.042 %) nasal spray 2 Puffs by Both Nostrils route two (2) times a day. Indications: inflammation of the nose due to an allergy      FLUoxetine (PROzac) 40 mg capsule TAKE 1 CAPSULE BY MOUTH ONCE DAILY      oxybutynin (DITROPAN) 5 mg tablet daily. cetirizine (ZYRTEC) 10 mg tablet Take 10 mg by mouth. Past History     Past Medical History:  Past Medical History:   Diagnosis Date    AF (paroxysmal atrial fibrillation) ([facility] 2021    Asthma     DJD (degenerative joint disease)     Fibromyalgia     GERD (gastroesophageal reflux disease)     Hypertension     Ill-defined condition     cholestrol    Menopause     age 38    Migraine     Rheumatoid arthritis ([facility]     Sleep disorder     sleep apnea uses CPAP at night       Past Surgical History:  Past Surgical History:   Procedure Laterality Date    HX CATARACT REMOVAL Right 2020    HX HEENT      strabismus correction       Family History:  Family History   Problem Relation Age of Onset    Diabetes Mother     Hypertension Sister        Social History:  Social History     Tobacco Use    Smoking status: Former     Packs/day: 0.25     Types: Cigarettes     Quit date:      Years since quittin.8    Smokeless tobacco: Never    Tobacco comments:     social smoker   Vaping Use    Vaping Use: Never used   Substance Use Topics    Alcohol use: Not Currently     Alcohol/week: 2.0 standard drinks     Types: 2 Glasses of wine per week     Comment: socially    Drug use: No       Allergies: Allergies   Allergen Reactions    Ace Inhibitors Angioedema    Aspirin Other (comments)    Other Medication Rash     Rash reaction to wipes used for cleansing prior to eye surgery.     Penicillins Hives    Sulfa (Sulfonamide Antibiotics) Unable to Obtain         Review of Systems   Review of Systems   Constitutional:  Negative for appetite change and fever.   HENT:  Positive for congestion. Negative for facial swelling and sore throat. Respiratory:  Positive for cough. Negative for shortness of breath. Cardiovascular:  Negative for chest pain. Gastrointestinal:  Negative for abdominal pain. Musculoskeletal:  Negative for back pain. Skin:  Negative for rash. Neurological:  Negative for light-headedness and headaches. Hematological:  Does not bruise/bleed easily. Physical Exam   Physical Exam  Vitals reviewed. Constitutional:       General: She is not in acute distress. Appearance: She is well-developed. She is not diaphoretic. HENT:      Head: Normocephalic and atraumatic. Right Ear: External ear normal.      Left Ear: External ear normal.      Nose: Nose normal.      Mouth/Throat:      Mouth: Mucous membranes are moist.   Eyes:      General: No scleral icterus. Right eye: No discharge. Left eye: No discharge. Conjunctiva/sclera: Conjunctivae normal.      Pupils: Pupils are equal, round, and reactive to light. Neck:      Thyroid: No thyromegaly. Trachea: No tracheal deviation. Cardiovascular:      Rate and Rhythm: Normal rate and regular rhythm. Heart sounds: Normal heart sounds. No murmur heard. No friction rub. No gallop. Pulmonary:      Effort: Pulmonary effort is normal. No respiratory distress. Breath sounds: Normal breath sounds. No wheezing or rales. Chest:      Chest wall: No tenderness. Abdominal:      General: Bowel sounds are normal. There is no distension. Palpations: Abdomen is soft. Tenderness: There is no abdominal tenderness. There is no guarding or rebound. Musculoskeletal:         General: No tenderness or deformity. Normal range of motion. Cervical back: Normal range of motion and neck supple. Skin:     General: Skin is warm and dry. Neurological:      Mental Status: She is alert and oriented to person, place, and time. Cranial Nerves:  No cranial nerve deficit. Coordination: Coordination normal.      Deep Tendon Reflexes: Reflexes are normal and symmetric. Reflexes normal.       Diagnostic Study Results     Labs -     Recent Results (from the past 12 hour(s))   INFLUENZA A+B VIRAL AGS    Collection Time: 10/31/22  8:55 PM   Result Value Ref Range    Influenza A Antigen Negative NEG      Influenza B Antigen Negative NEG         Radiologic Studies -   XR CHEST PA LAT   Final Result   Airspace disease in the left lower lobe. CT Results  (Last 48 hours)      None          CXR Results  (Last 48 hours)                 10/31/22 2109  XR CHEST PA LAT Final result    Impression:  Airspace disease in the left lower lobe. Narrative:  EXAM: XR CHEST PA LAT       HISTORY: cough. COMPARISON: 10/22/2022       FINDINGS: 2 view(s) of the chest. There is an area of patchy opacification in   the retrocardiac left lower lobe on the frontal x-ray. This is seen in the   retrocardiac clear space on the lateral. An object overlies the right upper   lobe. No pleural effusion, or pneumothorax. The cardiomediastinal silhouette is   unremarkable. The visualized osseous structures are unremarkable. Surgical clips   are seen in the upper abdomen. Medical Decision Making   I am the first provider for this patient. I reviewed the vital signs, available nursing notes, past medical history, past surgical history, family history and social history. Vital Signs-Reviewed the patient's vital signs. Patient Vitals for the past 12 hrs:   Temp Pulse Resp BP SpO2   10/31/22 2122 -- -- -- (!) 155/87 --   10/31/22 2034 -- -- -- (!) 152/98 --   10/31/22 1940 98.1 °F (36.7 °C) 73 17 (!) 202/103 98 %       Pulse Oximetry Analysis - 73% on RA    Records Reviewed:     Provider Notes (Medical Decision Making):   MDM: Patient with cough for the last week, worsening: pneumonia vs bronchitis vs     ED Course:   Initial assessment performed.  The patients presenting problems have been discussed, and they are in agreement with the care plan formulated and outlined with them. I have encouraged them to ask questions as they arise throughout their visit. PROGRESS NOTE:  Pt given 100 mg doxycycline while in the ED. Her blood pressure was repeated and found to be considerably decreased, to 152/98, with a heat rate of 73. It was repeated an hour later and was similar. Discharge note:  Pt re-evaluated and noted to be feeling better, ready for discharge. Sheba Rubio Updated pt on all final  Xray and lab findings  Will follow up as instructed. All questions have been answered, pt voiced understanding and agreement with plan. Abx were prescribed, pt advised that diarrhea and rash are possible side effects of the medications. Specific return precautions provided as well as instructions to return to the ED should sx worsen at any time. Vital signs stable for discharge. Critical Care Time: 0      Diagnosis     Clinical Impression:   1. Community acquired pneumonia of left lower lobe of lung    2. Hypertension, unspecified type        PLAN:  1. Discharge Medication List as of 10/31/2022 10:07 PM        START taking these medications    Details   doxycycline (VIBRA-TABS) 100 mg tablet Take 1 Tablet by mouth two (2) times a day for 7 days. , Normal, Disp-14 Tablet, R-0           CONTINUE these medications which have NOT CHANGED    Details   ibuprofen (MOTRIN) 600 mg tablet Take 1 Tablet by mouth every six (6) hours as needed for Pain., Normal, Disp-30 Tablet, R-1      meloxicam (MOBIC) 7.5 mg tablet Take 7.5 mg by mouth daily. , Historical Med      Omeprazole delayed release (PRILOSEC D/R) 20 mg tablet Take 20 mg by mouth daily. , Historical Med      atorvastatin (LIPITOR) 20 mg tablet Take 20 mg by mouth daily. , Historical Med      divalproex DR (Depakote) 250 mg tablet Take 250 mg by mouth three (3) times daily as needed., Historical Med      gabapentin (NEURONTIN) 300 mg capsule Take 300 mg by mouth nightly., Historical Med      topiramate (TOPAMAX) 50 mg tablet Take 50 mg by mouth nightly., Historical Med      metoprolol tartrate (LOPRESSOR) 25 mg tablet TAKE 1 TABLET BY MOUTH TWICE DAILY FOR BLOOD PRESSURE AND HEART PALPITATIONS, Historical Med      albuterol (PROVENTIL HFA, VENTOLIN HFA, PROAIR HFA) 90 mcg/actuation inhaler Take 2 Puffs by inhalation every four (4) hours as needed for Wheezing., Historical Med      beclomethasone (BECONASE AQ) 42 mcg (0.042 %) nasal spray 2 Puffs by Both Nostrils route two (2) times a day. Indications: inflammation of the nose due to an allergy, Historical Med      FLUoxetine (PROzac) 40 mg capsule TAKE 1 CAPSULE BY MOUTH ONCE DAILY, Historical Med      oxybutynin (DITROPAN) 5 mg tablet daily. , Historical Med      cetirizine (ZYRTEC) 10 mg tablet Take 10 mg by mouth., Historical Med           2. Follow-up Information       Follow up With Specialties Details Why Contact Info    Juliocesar Ordaz MD Internal Medicine Physician In 3 days  [phone]            Return to ED if worse     Disposition:  Home       ADDENDUM  2 November 1403  Called daughter to inform her of mother's +COVID test. Mom doing OK. At the free clinic now for a follow up appointment. Informed free clinic of her positive test. Daughter says that patient is doing well. Encouraged her to get COVID vaccine when she gets better.

## 2022-11-01 NOTE — DISCHARGE INSTRUCTIONS
--Doxycycline 100 mg twice daily for the next 7 days. Take with food. --Follow up with your doctor this week or next. --Return to the ED if you are getting worse, or if you have fevers, or have shortness of breath.

## 2022-11-02 LAB
SARS-COV-2, XPLCVT: DETECTED
SOURCE, COVRS: ABNORMAL

## 2022-12-02 ENCOUNTER — OFFICE VISIT (OUTPATIENT)
Dept: FAMILY MEDICINE CLINIC | Age: 62
End: 2022-12-02
Payer: MEDICAID

## 2022-12-02 VITALS
DIASTOLIC BLOOD PRESSURE: 85 MMHG | SYSTOLIC BLOOD PRESSURE: 131 MMHG | WEIGHT: 259 LBS | HEIGHT: 66 IN | OXYGEN SATURATION: 98 % | BODY MASS INDEX: 41.62 KG/M2 | RESPIRATION RATE: 18 BRPM | TEMPERATURE: 97 F | HEART RATE: 70 BPM

## 2022-12-02 DIAGNOSIS — G43.919 INTRACTABLE MIGRAINE WITHOUT STATUS MIGRAINOSUS, UNSPECIFIED MIGRAINE TYPE: ICD-10-CM

## 2022-12-02 DIAGNOSIS — E78.5 HYPERLIPIDEMIA, UNSPECIFIED HYPERLIPIDEMIA TYPE: ICD-10-CM

## 2022-12-02 DIAGNOSIS — G47.33 OSA (OBSTRUCTIVE SLEEP APNEA): ICD-10-CM

## 2022-12-02 DIAGNOSIS — Z12.11 ENCOUNTER FOR SCREENING COLONOSCOPY: ICD-10-CM

## 2022-12-02 DIAGNOSIS — Z12.31 ENCOUNTER FOR SCREENING MAMMOGRAM FOR MALIGNANT NEOPLASM OF BREAST: ICD-10-CM

## 2022-12-02 DIAGNOSIS — G25.81 RLS (RESTLESS LEGS SYNDROME): ICD-10-CM

## 2022-12-02 DIAGNOSIS — M25.50 GENERALIZED JOINT PAIN: ICD-10-CM

## 2022-12-02 DIAGNOSIS — Z23 ENCOUNTER FOR IMMUNIZATION: ICD-10-CM

## 2022-12-02 DIAGNOSIS — J45.20 MILD INTERMITTENT ASTHMA WITHOUT COMPLICATION: ICD-10-CM

## 2022-12-02 DIAGNOSIS — M81.0 AGE-RELATED OSTEOPOROSIS WITHOUT CURRENT PATHOLOGICAL FRACTURE: ICD-10-CM

## 2022-12-02 DIAGNOSIS — Z76.89 ENCOUNTER TO ESTABLISH CARE: Primary | ICD-10-CM

## 2022-12-02 DIAGNOSIS — R93.89 ABNORMAL CXR: ICD-10-CM

## 2022-12-02 DIAGNOSIS — I48.91 ATRIAL FIBRILLATION, UNSPECIFIED TYPE (HCC): ICD-10-CM

## 2022-12-02 DIAGNOSIS — G62.9 PERIPHERAL POLYNEUROPATHY: ICD-10-CM

## 2022-12-02 DIAGNOSIS — E55.9 VITAMIN D DEFICIENCY: ICD-10-CM

## 2022-12-02 DIAGNOSIS — I10 ESSENTIAL HYPERTENSION: ICD-10-CM

## 2022-12-02 DIAGNOSIS — M79.7 FIBROMYALGIA: ICD-10-CM

## 2022-12-02 DIAGNOSIS — F33.1 MODERATE EPISODE OF RECURRENT MAJOR DEPRESSIVE DISORDER (HCC): ICD-10-CM

## 2022-12-02 DIAGNOSIS — N32.81 OAB (OVERACTIVE BLADDER): ICD-10-CM

## 2022-12-02 DIAGNOSIS — K21.9 GASTROESOPHAGEAL REFLUX DISEASE, UNSPECIFIED WHETHER ESOPHAGITIS PRESENT: ICD-10-CM

## 2022-12-02 RX ORDER — HYDROCHLOROTHIAZIDE 12.5 MG/1
12.5 TABLET ORAL DAILY
COMMUNITY

## 2022-12-02 RX ORDER — SOLIFENACIN SUCCINATE 10 MG/1
10 TABLET, FILM COATED ORAL DAILY
Qty: 90 TABLET | Refills: 0
Start: 2022-12-02

## 2022-12-02 RX ORDER — METOPROLOL TARTRATE 50 MG/1
50 TABLET ORAL 2 TIMES DAILY
Qty: 180 TABLET | Refills: 1 | Status: SHIPPED | OUTPATIENT
Start: 2022-12-02

## 2022-12-02 RX ORDER — GALCANEZUMAB 120 MG/ML
INJECTION, SOLUTION SUBCUTANEOUS
COMMUNITY

## 2022-12-02 RX ORDER — SPIRONOLACTONE 25 MG
TABLET ORAL
COMMUNITY

## 2022-12-02 RX ORDER — IBUPROFEN 800 MG/1
TABLET ORAL
COMMUNITY
End: 2022-12-02

## 2022-12-02 RX ORDER — VENLAFAXINE 37.5 MG/1
37.5 TABLET ORAL 3 TIMES DAILY
COMMUNITY

## 2022-12-02 RX ORDER — METOPROLOL SUCCINATE 25 MG/1
TABLET, EXTENDED RELEASE ORAL DAILY
COMMUNITY
End: 2022-12-02 | Stop reason: DRUGHIGH

## 2022-12-02 RX ORDER — DICLOFENAC SODIUM 75 MG/1
TABLET, DELAYED RELEASE ORAL
COMMUNITY

## 2022-12-02 NOTE — PROGRESS NOTES
Subjective:     CC: estab care    Jose Alfredo Vallejo is a 60 y.o. female who presents today to [facility]. She is transferring from the [facility]. She is a poor historian but her daughter was on speaker-phone during the visit. About a month ago she was diagnosed with PNA at the ER. Was told to get a repeat CXR. Will order today. HTN  She is on HCTZ and Metoprolol. BP today at [facility], however daughter reports her DBP has been elevated in the 90's. Patient does report some SOB but she also has asthma. She has a little bit of swelling in the feet and ankles. Denies any CP or dizziness. A-fib  Was followed by cardiologist Dr Myrtle Levin until he retired in 1-2022. Has not been seen in a long time. On Metoprolol for rate control. Not anticoagulated. HLD  On lipitor with good control. Will recheck lipids today. Lab Results   Component Value Date/Time    Cholesterol, total 183 12/29/2021 04:00 PM    HDL Cholesterol 75 12/29/2021 04:00 PM    LDL, calculated 96 12/29/2021 04:00 PM    VLDL, calculated 12 12/29/2021 04:00 PM    Triglyceride 60 12/29/2021 04:00 PM    CHOL/HDL Ratio 2.4 12/29/2021 04:00 PM       Migraine headaches  Followed by neuro Dr Tanner Crain. She just started Emgality injections once a month 2 months ago. Currently gets a migraine once every day. RLS  States she takes gabapentin 300 mg daily qHS with good control. (According to  this has not been refilled since 3-2022 and she was only given a 3 month supply)    Neuropathy  She has chronic numbness and tingling in the hands and feet. Is not sure why. Asthma  Uses Albuterol prn about once a week. Does not smoke    Allergies  On Cetirizine with good control. Major depression  She is on Effexor 37.5mg TID, sometimes feels sad. Does not want to increase the dose. OAB  She is on Vesicare, states it helps, has to use Depends at times. BURTON  On CPAP. GERD  On Omeprazole with good control.      Hx of gallbladder removal in [phone]. OP  On Fosamax, calcium, and vit D supplement. Last DEXA- unknown. Will order repeat today. Chronic pain  She has a hx of fibromyalgia but also reports pain and joint swelling in the right knee pain- rec'd an injection recently- it helped somewhat but only temporarily. The provider recommended weight loss. She also has chronic pain in both hands, shoulders, ankles, and feet. She is taking Diclofenac 75mg once a week. Currently not taking Meloxicam due to migraine injections. Vit D deficiency  Lab Results   Component Value Date/Time    Vitamin D 25-Hydroxy 19.9 (L) 12/29/2021 04:00 PM       She takes a Vit D supplement daily. Will recheck Vit D level today. Health maintenance  PAP- done 11-17-22 (Normal)  Mammo- unsure of last mammo, will check previous records.    Colonoscopy- due, referred to [address] [place] [address] today   PNA vaccines- not addressed today  Flu shot- due, will give today  Shingrix- not addressed today  Covid vaccine- not addressed today      Patient Active Problem List   Diagnosis Code    Essential hypertension I10    Paroxysmal atrial fibrillation (HCC) I48.0    BURTON (obstructive sleep apnea) G47.33    Class 3 severe obesity due to excess calories without serious comorbidity in adult [facility]) E66.01    Fibromyalgia M79.7    Migraine G43.909    DJD (degenerative joint disease) M19.90    Cholelithiasis K80.20       Past Medical History:   Diagnosis Date    AF (paroxysmal atrial fibrillation) ([facility] 04/2021    Asthma     DJD (degenerative joint disease)     Fibromyalgia     GERD (gastroesophageal reflux disease)     Hypertension     Ill-defined condition     cholestrol    Menopause     age 38    Migraine     Rheumatoid arthritis ([facility]     Sleep disorder     sleep apnea uses CPAP at night         Current Outpatient Medications:     ibuprofen (MOTRIN) 600 mg tablet, Take 1 Tablet by mouth every six (6) hours as needed for Pain., Disp: 30 Tablet, Rfl: 1    meloxicam (MOBIC) 7.5 mg tablet, Take 7.5 mg by mouth daily. , Disp: , Rfl:     Omeprazole delayed release (PRILOSEC D/R) 20 mg tablet, Take 20 mg by mouth daily. , Disp: , Rfl:     atorvastatin (LIPITOR) 20 mg tablet, Take 20 mg by mouth daily. , Disp: , Rfl:     divalproex DR (Depakote) 250 mg tablet, Take 250 mg by mouth three (3) times daily as needed. , Disp: , Rfl:     gabapentin (NEURONTIN) 300 mg capsule, Take 300 mg by mouth nightly., Disp: , Rfl:     topiramate (TOPAMAX) 50 mg tablet, Take 50 mg by mouth nightly., Disp: , Rfl:     metoprolol tartrate (LOPRESSOR) 25 mg tablet, TAKE 1 TABLET BY MOUTH TWICE DAILY FOR BLOOD PRESSURE AND HEART PALPITATIONS, Disp: , Rfl:     albuterol (PROVENTIL HFA, VENTOLIN HFA, PROAIR HFA) 90 mcg/actuation inhaler, Take 2 Puffs by inhalation every four (4) hours as needed for Wheezing., Disp: , Rfl:     beclomethasone (BECONASE AQ) 42 mcg (0.042 %) nasal spray, 2 Puffs by Both Nostrils route two (2) times a day. Indications: inflammation of the nose due to an allergy, Disp: , Rfl:     FLUoxetine (PROzac) 40 mg capsule, TAKE 1 CAPSULE BY MOUTH ONCE DAILY, Disp: , Rfl:     oxybutynin (DITROPAN) 5 mg tablet, daily. , Disp: , Rfl:     cetirizine (ZYRTEC) 10 mg tablet, Take 10 mg by mouth., Disp: , Rfl:     Allergies   Allergen Reactions    Ace Inhibitors Angioedema    Aspirin Other (comments)    Other Medication Rash     Rash reaction to wipes used for cleansing prior to eye surgery.     Penicillins Hives    Sulfa (Sulfonamide Antibiotics) Unable to Obtain       Past Surgical History:   Procedure Laterality Date    HX CATARACT REMOVAL Right 2020    HX HEENT      strabismus correction       Social History     Tobacco Use   Smoking Status Former    Packs/day: 0.25    Types: Cigarettes    Quit date:     Years since quittin.9   Smokeless Tobacco Never   Tobacco Comments    social smoker       Social History     Socioeconomic History    Marital status:    Tobacco Use    Smoking status: Former     Packs/day: 0.25 Types: Cigarettes     Quit date:      Years since quittin.9    Smokeless tobacco: Never    Tobacco comments:     social smoker   Vaping Use    Vaping Use: Never used   Substance and Sexual Activity    Alcohol use: Not Currently     Alcohol/week: 2.0 standard drinks     Types: 2 Glasses of wine per week     Comment: socially    Drug use: No    Sexual activity: Never       Family History   Problem Relation Age of Onset    Diabetes Mother     Hypertension Sister        ROS:  Gen: denies fever, chills, or fatigue  HEENT:denies H/A, nasal congestion, ear pain, or sore throat  Resp: + occas dyspnea, denies cough or wheezing  CV: denies chest pain, pressure, or palpitations  Extremeties: + swelling in both feet  GI[de-identified] denies abdominal pain, nausea, vomiting, diarrhea, or constipation  : denies dysuria or hematuria, +urinary frequency with occas incontinence  Musculoskeletal: +chronic generalized joint pain, stiffness, and swelling  Neuro: + numbness/tingling in hands and feet, denies dizziness, extremity weakness, tremor, or seizure activity  Skin: denies rashes or new lesions   Psych: denies anxiety, +depression    Objective:     Visit Vitals  /85 (BP 1 Location: Left upper arm, BP Patient Position: At rest, BP Cuff Size: Adult)   Pulse 70   Temp 97 °F (36.1 °C) (Temporal)   Resp 18   Ht 5' 6\" (1.676 m)   Wt 259 lb (117.5 kg)   SpO2 98%   BMI 41.80 kg/m²       General: Alert and oriented. No acute distress. +Obese  HEENT :  Ears:TMs normal. Canals clear. Eyes: Sclera white, conjunctiva clear. PERRLA. Extra ocular movements intact. Nose: Patent. Nasal mucosa pink, non-edematous, and without drainage. Mouth: Pharynx non-erythematous, without exudate   Neck: Supple with FROM. No thyroid-megaly, carotid bruits, or lymphadenopathy  Lungs: Breathing even and unlabored.  All lobes clear to auscultation bilaterally   Heart :RRR, S1 and S2 normal intensity, no extra heart sounds  Extremities: +mild non-pitting edema to both feet   Musculoskeletal: +joint swelling to both hands   Neuro: Cranial nerves grossly normal.  Psych: Mood and thought content appropriate for situation. Dressed appropriately and with good hygiene. Skin: Warm, dry, and intact. No lesions or discoloration. Assessment/ Plan:     Encounter to [facility] care    Abnormal CXR  Had PNA over a month ago  Get repeat CXR    HTN  BP at goal today, however daughter reports her DBP has been elevated in the 90's. Increase Metoprolol to 50mg  Cont HCTZ  Limit sodium in the diet  F/U 1 month    A-fib  Stable  Cont Metoprolol for rate control  She is not on any blood thinners  Go to ER for any CP, SOB, dizziness, or syncope  Referred to cardiologist for further eval.    HLD  Check lipids  Cont lipitor  Low fat diet      Migraine headaches  Per neuro     Neuropathy  Per ortho     Asthma  Stable  Cont Albuterol prn  F/U 6 months or sooner prn if symptoms worsen. Allergies  Well controlled  Cont Cetirizine      Major depression  Stable  Cont Effexor   F/U 6 months or sooner if symptoms worsen    OAB  Stable  Cont  Vesicare  Limit caffeine  F/U 6 months or sooner if symptoms worsen    BURTON  Cont CPAP. GERD  Stable  Cont Omeprazole  Avoid triggers  F/U 6 months    OP  Get repeat DEXA  Check Calcium and Vit D level  Cont Fosamax, calcium, and vit D supplement  F/U 6 months    Chronic pain  Cont NSAID  Referred to rheum for further eval and treatment      Vit D deficiency  Check Vit D level  Cont Vit D supplement daily      Health maintenance  PAP- done 11-17-22 (Normal)  Mammo- unsure of last mammo, will check previous records. Colonoscopy- due, referred to [address] [place] [address] today   PNA vaccines- not addressed today  Flu shot- due, will give today  Shingrix- not addressed today  Covid vaccine- not addressed today      Verbal and written instructions (see AVS) provided. Patient expresses understanding of diagnosis and treatment plan.     Health Maintenance Due Topic Date Due    Hepatitis C Screening  Never done    COVID-19 Vaccine (1) Never done    Cervical cancer screen  Never done    Colorectal Cancer Screening Combo  Never done    Shingrix Vaccine Age 50> (1 of 2) Never done    Breast Cancer Screen Mammogram  01/28/2022    DTaP/Tdap/Td series (2 - Td or Tdap) 07/24/2022    Flu Vaccine (1) 08/01/2022    Depression Screen  12/10/2022               Jaret Mancuso, NP

## 2022-12-02 NOTE — PROGRESS NOTES
Flu shot administered in left deltoid. Patient tolerated medication well. AVS provided to patient with medication information. Patient states understanding.

## 2022-12-02 NOTE — PROGRESS NOTES
1. \"Have you been to the ER, urgent care clinic since your last visit? Hospitalized since your last visit? \" Yes pneumonia    2. \"Have you seen or consulted any other health care providers outside of the [address] [place] [address] since your last visit? \" No     3. For patients aged 39-70: Has the patient had a colonoscopy / FIT/ Cologuard? No      If the patient is female:    4. For patients aged 41-77: Has the patient had a mammogram within the past 2 years? No      5. For patients aged 21-65: Has the patient had a pap smear?  No

## 2022-12-05 ENCOUNTER — TELEPHONE (OUTPATIENT)
Dept: SURGERY | Age: 62
End: 2022-12-05

## 2022-12-05 ENCOUNTER — DOCUMENTATION ONLY (OUTPATIENT)
Dept: FAMILY MEDICINE CLINIC | Age: 62
End: 2022-12-05

## 2022-12-05 NOTE — PROGRESS NOTES
Labs look great other than cholesterol which is a little high. If she was fasting I would recommend increasing lipitor to 40mg daily.  Otherwise cont 20mg daily

## 2022-12-16 ENCOUNTER — HOSPITAL ENCOUNTER (OUTPATIENT)
Dept: GENERAL RADIOLOGY | Age: 62
End: 2022-12-16
Payer: MEDICAID

## 2022-12-16 ENCOUNTER — TRANSCRIBE ORDER (OUTPATIENT)
Dept: REGISTRATION | Age: 62
End: 2022-12-16

## 2022-12-16 DIAGNOSIS — R91.8 LUNG MASS: Primary | ICD-10-CM

## 2022-12-16 DIAGNOSIS — R91.8 LUNG MASS: ICD-10-CM

## 2022-12-16 PROCEDURE — 71046 X-RAY EXAM CHEST 2 VIEWS: CPT

## 2023-01-08 NOTE — PROGRESS NOTES
Destiny Dillard is a 60 y.o. female is here for routine f/u. Hx hypertension, BURTON (CPAP), migraine headaches, DJD, fibromyalgia,obesity. Prev followed at [facility], recently established cared with Manjit Bo NP. Initially seen by Dr Roderick Garcia in 4/2021 after Recent sx of palpitations/tachycardia--dx'd afib by Dr Radha Chaves (not clearly documented)--sent here for evaluation and Holter and see in 4/21. Jacklyn Bloodgood Echo 4/14/21 with normal chambers, valves, function--LVEF 65-70, normal RVSP. No hx DM, TIA/CVA/vascular disease, other. Holter monitor 5/21 negative--no arrhythmias seen. On amlodipine + HCTZ for hypertension. Hx ACEI allergy with angioedema. S/p recent cholecystectomy for symptomatic GB disease, seen in fu by PCP. Last seen by Dr Roderick Garcia in 12/2021:  Occasional palpitations, no prolonged episodes (is not on [address]) . Today    States less episodes of palpitation since PCP adjusted her BB to 50 mg BID. Stable occasional sob    The patient denies chest pain, orthopnea, PND, LE edema, syncope, presyncope or fatigue.          Patient Active Problem List    Diagnosis Date Noted    Mild intermittent asthma without complication 55/94/4848    Hyperlipidemia 12/02/2022    Vitamin D deficiency 12/02/2022    Moderate episode of recurrent major depressive disorder ([address] 12/02/2022    OAB (overactive bladder) 12/02/2022    Peripheral polyneuropathy 12/02/2022    RLS (restless legs syndrome) 12/02/2022    Gastroesophageal reflux disease 12/02/2022    Cholelithiasis 09/17/2021    Essential hypertension 04/20/2021    Paroxysmal atrial fibrillation ([address] 04/20/2021    BURTON (obstructive sleep apnea) 04/20/2021    Class 3 severe obesity due to excess calories without serious comorbidity in adult [facility]) 04/20/2021    Fibromyalgia     Migraine     DJD (degenerative joint disease)       Isidoro Rodríguez NP  Past Medical History:   Diagnosis Date    AF (paroxysmal atrial fibrillation) ([address] 04/2021    Asthma     DJD Physical Therapy   Daily Treatment     Patient Name: Cooper Harvey  Age:  87 y.o., Sex:  male  Medical Record #: [id number]  Today's Date: 12/14/2022     Precautions  Precautions: Fall Risk  Comments: Hearing aids  [place]    Subjective    Pt resting in bed, willing to participate after using restroom     Objective       12/14/22 1001   PT Charge Group   PT Therapeutic Exercise 1   PT Therapeutic Activities 1   PT Total Time Spent   PT Individual Total Time Spent (Mins) 30   Transfer Functional Level of Assist   Bed, Chair, Wheelchair Transfer Standby Assist   Bed Chair Wheelchair Transfer Description Adaptive equipment;Increased time;Set-up of equipment   Toilet Transfers Standby Assist   Toilet Transfer Description Adaptive equipment;Grab bar;Increased time;Set-up of equipment   Standing Lower Body Exercises   Hamstring Curl 2 sets of 10   Hip Flexion 2 sets of 10   Hip Extension 2 sets of 10   Hip Abduction 2 sets of 10   Marching 2 sets of 10   Heel Rise 2 sets of 10   Mini Squat 2 sets of 10       Assessment    Pt tolerated transfers and standing exercises well, requires visual cues for proper form with standing exercises.    Strengths: Able to follow instructions, Effective communication skills, Independent prior level of function, Manages pain appropriately, Motivated for self care and independence, Pleasant and cooperative, Supportive family, Willingly participates in therapeutic activities  Barriers: Decreased endurance, Fatigue, Generalized weakness, Home accessibility, Impaired activity tolerance, Impaired balance, Limited mobility    Plan    Caregiver training Thursday for car transfer and additional hands on training for stairs. collection of [place] IRF-SAMANTHA in preparation for [place] 12/16, complete passport     Passport items to be completed Get in/out of bed safely, in/out of a vehicle, safely use mobility device, walk or wheel around home/community, navigate up and down stairs, show how to get up/down from  (degenerative joint disease)     Fibromyalgia     GERD (gastroesophageal reflux disease)     Hypertension     Ill-defined condition     cholestrol    Menopause     age 38    Migraine     Rheumatoid arthritis ([facility]     Sleep disorder     sleep apnea uses CPAP at night      Past Surgical History:   Procedure Laterality Date    HX CATARACT REMOVAL Right 2020    HX HEENT      strabismus correction     Allergies   Allergen Reactions    Ace Inhibitors Angioedema    Aspirin Other (comments)    Other Medication Rash     Rash reaction to wipes used for cleansing prior to eye surgery.     Penicillins Hives    Sulfa (Sulfonamide Antibiotics) Unable to Obtain      Family History   Problem Relation Age of Onset    Diabetes Mother     Hypertension Sister       Social History     Socioeconomic History    Marital status:      Spouse name: Not on file    Number of children: Not on file    Years of education: Not on file    Highest education level: Not on file   Occupational History    Not on file   Tobacco Use    Smoking status: Former     Packs/day: 0.25     Types: Cigarettes     Quit date:      Years since quittin.0    Smokeless tobacco: Never    Tobacco comments:     social smoker   Vaping Use    Vaping Use: Never used   Substance and Sexual Activity    Alcohol use: Not Currently     Alcohol/week: 2.0 standard drinks     Types: 2 Glasses of wine per week     Comment: socially    Drug use: No    Sexual activity: Never   Other Topics Concern    Not on file   Social History Narrative    Not on file     Social Determinants of Health     Financial Resource Strain: Not on file   Food Insecurity: Not on file   Transportation Needs: Not on file   Physical Activity: Not on file   Stress: Not on file   Social Connections: Not on file   Intimate Partner Violence: Not on file   Housing Stability: Not on file      Current Outpatient Medications   Medication Sig    aspirin 81 mg chewable tablet Take 81 mg by mouth the ground, ensure home is accessible, demonstrate HEP, complete caregiver training    Physical Therapy Problems (Active)       Problem: PT-Long Term Goals       Dates: Start: 12/03/22         Goal: LTG-By discharge, patient will ambulate x 150 feet with FWW and SPV       Dates: Start: 12/03/22            Goal: LTG-By discharge, patient will transfer one surface to another with FWW and SPV       Dates: Start: 12/03/22            Goal: LTG-By discharge, patient will ambulate up/down 4-6 stairs with one HR and SBA       Dates: Start: 12/03/22            Goal: LTG-By discharge, patient will transfer in/out of a car with FWW and SBA       Dates: Start: 12/03/22               daily.    venlafaxine (EFFEXOR) 37.5 mg tablet Take 37.5 mg by mouth three (3) times daily. galcanezumab-gnlm (Emgality Syringe) 120 mg/mL syrg by SubCUTAneous route. diclofenac EC (VOLTAREN) 75 mg EC tablet Take  by mouth. omega 3-dha-epa-fish oil 100-160-1,000 mg cap Take  by mouth. alendronate-vitamin d3 70 mg- 2,800 unit per tablet Take 1 Tablet by mouth every seven (7) days. Calcium-Cholecalciferol, D3, (Calcium 600 with Vitamin D3) 600 mg-10 mcg (400 unit) chew Take  by mouth.    metoprolol tartrate (LOPRESSOR) 50 mg tablet Take 1 Tablet by mouth two (2) times a day. solifenacin (VESICARE) 10 mg tablet Take 1 Tablet by mouth daily. meloxicam (MOBIC) 7.5 mg tablet Take 7.5 mg by mouth daily. Omeprazole delayed release (PRILOSEC D/R) 20 mg tablet Take 20 mg by mouth daily. atorvastatin (LIPITOR) 20 mg tablet Take 20 mg by mouth daily. gabapentin (NEURONTIN) 300 mg capsule Take 300 mg by mouth nightly. topiramate (TOPAMAX) 50 mg tablet Take 50 mg by mouth nightly. albuterol (PROVENTIL HFA, VENTOLIN HFA, PROAIR HFA) 90 mcg/actuation inhaler Take 2 Puffs by inhalation every four (4) hours as needed for Wheezing. beclomethasone (BECONASE AQ) 42 mcg (0.042 %) nasal spray 2 Puffs by Both Nostrils route two (2) times a day. Indications: inflammation of the nose due to an allergy    cetirizine (ZYRTEC) 10 mg tablet Take 10 mg by mouth. No current facility-administered medications for this visit. Review of Symptoms:    CONST  No weight change. No fever, chills, sweats    ENT No visual changes, URI sx, sore throat    CV  See HPI   RESP  No cough, or sputum, wheezing. Also see HPI   GI  No abdominal pain or change in bowel habits. No heartburn or dysphagia. No melena or rectal bleeding.   No dysuria, urgency, frequency, hematuria   MSKEL  No joint pain, swelling. No muscle pain. SKIN  No rash or lesions. NEURO  No headache, syncope, or seizure.    No weakness, loss of sensation, or paresthesias. PSYCH  No low mood or depression  No anxiety. HE/LYMPH  No easy bruising, abnormal bleeding, or enlarged glands. Physical ExamPhysical Exam:    Visit Vitals  BP 98/62 (BP 1 Location: Left upper arm, BP Patient Position: Sitting, BP Cuff Size: Large adult)   Pulse 68   Temp 98.2 °F (36.8 °C) (Temporal)   Resp 18   Ht 5' 6\" (1.676 m)   Wt 261 lb (118.4 kg)   SpO2 98% Comment: ra   BMI 42.13 kg/m²       Gen: NAD  HEENT:  PERRL, throat clear  Neck: no adenopathy, no thyromegaly, no JVD   Heart:  Regular,Nl S1S2,  no murmur, gallop or rub. Lungs:  clear  Abdomen:   Soft, non-tender, bowel sounds are active.    Extremities:  No edema  Pulse: symmetric  Neuro: A&O times 3, No focal neuro deficits    Cardiographics    ECG: NSR    Labs:   Lab Results   Component Value Date/Time    Sodium 140 12/02/2022 04:00 PM    Sodium 139 12/29/2021 04:00 PM    Sodium 139 10/02/2021 07:21 AM    Sodium 142 08/31/2021 07:33 PM    Sodium 139 07/26/2021 05:25 PM    Potassium 3.7 12/02/2022 04:00 PM    Potassium 4.4 12/29/2021 04:00 PM    Potassium 3.8 10/02/2021 07:21 AM    Potassium 3.5 08/31/2021 07:33 PM    Potassium 3.4 (L) 07/26/2021 05:25 PM    Chloride 105 12/02/2022 04:00 PM    Chloride 104 12/29/2021 04:00 PM    Chloride 108 10/02/2021 07:21 AM    Chloride 104 08/31/2021 07:33 PM    Chloride 104 07/26/2021 05:25 PM    CO2 20 12/02/2022 04:00 PM    CO2 24 12/29/2021 04:00 PM    CO2 26 10/02/2021 07:21 AM    CO2 31 08/31/2021 07:33 PM    CO2 26 07/26/2021 05:25 PM    Anion gap 11 12/29/2021 04:00 PM    Anion gap 5 10/02/2021 07:21 AM    Anion gap 7 08/31/2021 07:33 PM    Anion gap 9 07/26/2021 05:25 PM    Anion gap 9 09/07/2018 08:31 AM    Glucose 91 12/02/2022 04:00 PM    Glucose 91 12/29/2021 04:00 PM    Glucose 114 (H) 10/02/2021 07:21 AM    Glucose 72 08/31/2021 07:33 PM    Glucose 116 (H) 07/26/2021 05:25 PM    BUN 21 12/02/2022 04:00 PM    BUN 19 12/29/2021 04:00 PM BUN 19 10/02/2021 07:21 AM    BUN 17 08/31/2021 07:33 PM    BUN 15 07/26/2021 05:25 PM    Creatinine 0.81 12/02/2022 04:00 PM    Creatinine 0.69 12/29/2021 04:00 PM    Creatinine 0.65 10/02/2021 07:21 AM    Creatinine 0.67 08/31/2021 07:33 PM    Creatinine 0.79 07/26/2021 05:25 PM    BUN/Creatinine ratio 26 12/02/2022 04:00 PM    BUN/Creatinine ratio 28 (H) 12/29/2021 04:00 PM    BUN/Creatinine ratio 29 (H) 10/02/2021 07:21 AM    BUN/Creatinine ratio 25 (H) 08/31/2021 07:33 PM    BUN/Creatinine ratio 19 07/26/2021 05:25 PM    GFR est AA >60 12/29/2021 04:00 PM    GFR est AA >60 10/02/2021 07:21 AM    GFR est AA >60 08/31/2021 07:33 PM    GFR est AA >60 07/26/2021 05:25 PM    GFR est AA >60 09/07/2018 08:31 AM    GFR est non-AA >60 12/29/2021 04:00 PM    GFR est non-AA >60 10/02/2021 07:21 AM    GFR est non-AA >60 08/31/2021 07:33 PM    GFR est non-AA >60 07/26/2021 05:25 PM    GFR est non-AA >60 09/07/2018 08:31 AM    Calcium 9.6 12/02/2022 04:00 PM    Calcium 8.8 12/29/2021 04:00 PM    Calcium 8.7 10/02/2021 07:21 AM    Calcium 9.3 08/31/2021 07:33 PM    Calcium 9.0 07/26/2021 05:25 PM    Bilirubin, total 0.4 12/02/2022 04:00 PM    Bilirubin, total 0.4 12/29/2021 04:00 PM    Bilirubin, total 0.6 10/02/2021 07:21 AM    Bilirubin, total 0.9 07/28/2021 01:25 PM    Bilirubin, total 1.2 (H) 07/26/2021 05:25 PM    Alk. phosphatase 82 12/02/2022 04:00 PM    Alk. phosphatase 83 12/29/2021 04:00 PM    Alk. phosphatase 73 10/02/2021 07:21 AM    Alk. phosphatase 72 07/26/2021 05:25 PM    Alk.  phosphatase 72 04/01/2020 10:31 AM    Protein, total 7.1 12/02/2022 04:00 PM    Protein, total 6.9 12/29/2021 04:00 PM    Protein, total 6.8 10/02/2021 07:21 AM    Protein, total 7.4 07/26/2021 05:25 PM    Protein, total 7.0 09/07/2018 08:31 AM    Albumin 4.2 12/02/2022 04:00 PM    Albumin 3.7 12/29/2021 04:00 PM    Albumin 2.9 (L) 10/02/2021 07:21 AM    Albumin 3.3 (L) 07/26/2021 05:25 PM    Albumin 3.7 09/07/2018 08:31 AM    Globulin 3.2 12/29/2021 04:00 PM    Globulin 3.9 10/02/2021 07:21 AM    Globulin 4.1 (H) 07/26/2021 05:25 PM    Globulin 3.3 09/07/2018 08:31 AM    A-G Ratio 1.4 12/02/2022 04:00 PM    A-G Ratio 1.2 12/29/2021 04:00 PM    A-G Ratio 0.7 (L) 10/02/2021 07:21 AM    A-G Ratio 0.8 (L) 07/26/2021 05:25 PM    A-G Ratio 1.1 09/07/2018 08:31 AM    ALT (SGPT) 20 12/02/2022 04:00 PM    ALT (SGPT) 25 12/29/2021 04:00 PM    ALT (SGPT) 61 10/02/2021 07:21 AM    ALT (SGPT) 26 07/28/2021 01:25 PM    ALT (SGPT) 26 07/26/2021 05:25 PM     No results found for: CPK, CPKX, CPX  Lab Results   Component Value Date/Time    Cholesterol, total 187 12/02/2022 04:00 PM    Cholesterol, total 183 12/29/2021 04:00 PM    Cholesterol, total 198 03/24/2021 12:57 PM    Cholesterol, total 252 (H) 10/14/2020 04:51 PM    Cholesterol, total 188 09/07/2018 08:31 AM    HDL Cholesterol 65 12/02/2022 04:00 PM    HDL Cholesterol 75 12/29/2021 04:00 PM    HDL Cholesterol 72 03/24/2021 12:57 PM    HDL Cholesterol 77 10/14/2020 04:51 PM    HDL Cholesterol 67 09/07/2018 08:31 AM    LDL, calculated 111 (H) 12/02/2022 04:00 PM    LDL, calculated 96 12/29/2021 04:00 PM    LDL, calculated 118 (H) 03/24/2021 12:57 PM    LDL, calculated 164.4 (H) 10/14/2020 04:51 PM    LDL, calculated 115.8 (H) 09/07/2018 08:31 AM    Triglyceride 57 12/02/2022 04:00 PM    Triglyceride 60 12/29/2021 04:00 PM    Triglyceride 40 03/24/2021 12:57 PM    Triglyceride 53 10/14/2020 04:51 PM    Triglyceride 26 09/07/2018 08:31 AM    CHOL/HDL Ratio 2.4 12/29/2021 04:00 PM    CHOL/HDL Ratio 2.8 03/24/2021 12:57 PM    CHOL/HDL Ratio 3.3 10/14/2020 04:51 PM    CHOL/HDL Ratio 2.8 09/07/2018 08:31 AM     No results found for this or any previous visit.     Assessment:         Patient Active Problem List    Diagnosis Date Noted    Mild intermittent asthma without complication 77/96/7248    Hyperlipidemia 12/02/2022    Vitamin D deficiency 12/02/2022    Moderate episode of recurrent major depressive disorder ([facility] [address] [address] 12/02/2022    OAB (overactive bladder) 12/02/2022    Peripheral polyneuropathy 12/02/2022    RLS (restless legs syndrome) 12/02/2022    Gastroesophageal reflux disease 12/02/2022    Cholelithiasis 09/17/2021    Essential hypertension 04/20/2021    Paroxysmal atrial fibrillation ([facility] [address] 04/20/2021    BURTON (obstructive sleep apnea) 04/20/2021    Class 3 severe obesity due to excess calories without serious comorbidity in adult [facility]) 04/20/2021    Fibromyalgia     Migraine     DJD (degenerative joint disease)       Hx hypertension, BURTON (CPAP), migraine headaches, DJD, fibromyalgia,obesity. Recent sx of palpitations/tachycardia--dx'd afib by Dr Ricardo Forrest (not clearly documented)--sent here for evaluation and Holter and see in 4/21. Ricardo Gutter Echo 4/14/21 with normal chambers, valves, function--LVEF 65-70, normal RVSP. No hx DM, TIA/CVA/vascular disease, other. Holter monitor 5/21 negative--no arrhythmias seen. On amlodipine + HCTZ for hypertension. Hx ACEI allergy with angioedema. S/p recent cholecystectomy for symptomatic GB disease, seen in fu by PCP. Occasional palpitations, no prolonged episodes (is not on [address])      Plan:     PAF  Echo 4/14/21 with normal chambers, valves, function--LVEF 65-70, normal RVSP  Holter monitor 5/21 negative--no arrhythmias seen.    Continue ASA--stated prev tolerated low dose ASA, had some skin irritation on regular ASA  Continue BB  Repeat echo and 1 mos event  Recall Consider low dose beta blocker vs cardizem if significant afib/palps    HTN  Controlled with BB  Serum Cr 0.81 in 12/2022  Recall Angioedema with ace    HLD  12/2022  Atorva has been increased to 40 mg since  Labs and lipids per PCP    BURTON  On PAP therapy    GERD  On PPI      Hx cholecystectomy for symptomatic GB disease      RTC in 2 mos       Jeremy Hilliard NP

## 2023-01-12 ENCOUNTER — OFFICE VISIT (OUTPATIENT)
Dept: CARDIOLOGY CLINIC | Age: 63
End: 2023-01-12

## 2023-01-12 ENCOUNTER — OFFICE VISIT (OUTPATIENT)
Dept: FAMILY MEDICINE CLINIC | Age: 63
End: 2023-01-12

## 2023-01-12 VITALS
SYSTOLIC BLOOD PRESSURE: 98 MMHG | RESPIRATION RATE: 18 BRPM | OXYGEN SATURATION: 98 % | TEMPERATURE: 98.2 F | HEIGHT: 66 IN | DIASTOLIC BLOOD PRESSURE: 62 MMHG | BODY MASS INDEX: 41.95 KG/M2 | WEIGHT: 261 LBS | HEART RATE: 68 BPM

## 2023-01-12 VITALS
SYSTOLIC BLOOD PRESSURE: 120 MMHG | OXYGEN SATURATION: 99 % | BODY MASS INDEX: 41.91 KG/M2 | DIASTOLIC BLOOD PRESSURE: 78 MMHG | TEMPERATURE: 98.1 F | HEART RATE: 64 BPM | WEIGHT: 260.8 LBS | RESPIRATION RATE: 20 BRPM | HEIGHT: 66 IN

## 2023-01-12 DIAGNOSIS — I10 ESSENTIAL HYPERTENSION: Primary | ICD-10-CM

## 2023-01-12 DIAGNOSIS — G47.33 OBSTRUCTIVE SLEEP APNEA (ADULT) (PEDIATRIC): ICD-10-CM

## 2023-01-12 DIAGNOSIS — E78.5 HYPERLIPIDEMIA, UNSPECIFIED HYPERLIPIDEMIA TYPE: ICD-10-CM

## 2023-01-12 DIAGNOSIS — I48.91 ATRIAL FIBRILLATION, UNSPECIFIED TYPE (HCC): ICD-10-CM

## 2023-01-12 DIAGNOSIS — J45.20 MILD INTERMITTENT ASTHMA WITHOUT COMPLICATION: ICD-10-CM

## 2023-01-12 DIAGNOSIS — I49.1 PAC (PREMATURE ATRIAL CONTRACTION): ICD-10-CM

## 2023-01-12 DIAGNOSIS — I10 ESSENTIAL HYPERTENSION: ICD-10-CM

## 2023-01-12 DIAGNOSIS — I48.0 PAROXYSMAL ATRIAL FIBRILLATION (HCC): ICD-10-CM

## 2023-01-12 DIAGNOSIS — R06.02 SOB (SHORTNESS OF BREATH): Primary | ICD-10-CM

## 2023-01-12 DIAGNOSIS — E78.2 MIXED HYPERLIPIDEMIA: ICD-10-CM

## 2023-01-12 DIAGNOSIS — R00.2 INTERMITTENT PALPITATIONS: ICD-10-CM

## 2023-01-12 PROCEDURE — 99214 OFFICE O/P EST MOD 30 MIN: CPT | Performed by: NURSE PRACTITIONER

## 2023-01-12 PROCEDURE — 3078F DIAST BP <80 MM HG: CPT | Performed by: NURSE PRACTITIONER

## 2023-01-12 PROCEDURE — 3074F SYST BP LT 130 MM HG: CPT | Performed by: NURSE PRACTITIONER

## 2023-01-12 RX ORDER — GUAIFENESIN 100 MG/5ML
81 LIQUID (ML) ORAL DAILY
COMMUNITY

## 2023-01-12 RX ORDER — FLUTICASONE FUROATE AND VILANTEROL 100; 25 UG/1; UG/1
1 POWDER RESPIRATORY (INHALATION) DAILY
Qty: 60 EACH | Refills: 0 | Status: SHIPPED | OUTPATIENT
Start: 2023-01-12 | End: 2023-01-12

## 2023-01-12 RX ORDER — FLUTICASONE PROPIONATE AND SALMETEROL 100; 50 UG/1; UG/1
1 POWDER RESPIRATORY (INHALATION) 2 TIMES DAILY
Qty: 60 EACH | Refills: 0 | Status: SHIPPED | OUTPATIENT
Start: 2023-01-12

## 2023-01-12 RX ORDER — BUDESONIDE AND FORMOTEROL FUMARATE DIHYDRATE 80; 4.5 UG/1; UG/1
2 AEROSOL RESPIRATORY (INHALATION) 2 TIMES DAILY
Qty: 10.2 G | Status: CANCELLED | OUTPATIENT
Start: 2023-01-12

## 2023-01-12 RX ORDER — ATORVASTATIN CALCIUM 40 MG/1
40 TABLET, FILM COATED ORAL DAILY
Qty: 90 TABLET | Refills: 0 | Status: SHIPPED | OUTPATIENT
Start: 2023-01-12

## 2023-01-12 NOTE — PROGRESS NOTES
Subjective:     CC: SOB    Veta Boeck is a 60 y.o. female who presents today with c/o SOB. This is also a 1 month follow up for HTN. She transferred from the [facility] 6 weeks ago. Her daughter was present on speaker phone during the visit. Asthma  Uses Albuterol about once a week. Today we discussed the option of adding a long acting inhaler to see if it improves her SOB. Script sent for [employer]. She caught PNA back in , repet CXR in 12-16-22 showed improved aeration. Daughter would like another CXR to make sure it has completely resolved. Order placed today. HTN  During the last appt her daughter had called on the phone and stated pt's BP had been elevated at home so her Metoprolol was increased to 50mg daily. She was told to cont the HCTZ. BP today is at goal and patient states it has been lower at home, as well. Denies any dizziness or lightheadedness. A-fib  She saw cardiology specialist NP David Valdivia earlier today. Pt reports CORADO so an Echo and Cardiac event monitor were ordered. On Metoprolol for rate control. Not anticoagulated. HLD  On lipitor 20mg daily. Recent LDL was elevated, goal is <70, will increase to 40mg daily.     Lab Results   Component Value Date/Time    Cholesterol, total 187 12/02/2022 04:00 PM    HDL Cholesterol 65 12/02/2022 04:00 PM    LDL, calculated 111 (H) 12/02/2022 04:00 PM    LDL, calculated 96 12/29/2021 04:00 PM    VLDL, calculated 11 12/02/2022 04:00 PM    VLDL, calculated 12 12/29/2021 04:00 PM    Triglyceride 57 12/02/2022 04:00 PM    CHOL/HDL Ratio 2.4 12/29/2021 04:00 PM           Patient Active Problem List   Diagnosis Code    Essential hypertension I10    Paroxysmal atrial fibrillation (HCC) I48.0    BURTON (obstructive sleep apnea) G47.33    Class 3 severe obesity due to excess calories without serious comorbidity in adult [facility]) E66.01    Fibromyalgia M79.7    Migraine G43.909    DJD (degenerative joint disease) M19.90    Cholelithiasis K80.20    Mild intermittent asthma without complication P95.90    Hyperlipidemia E78.5    Vitamin D deficiency E55.9    Moderate episode of recurrent major depressive disorder (HCC) F33.1    OAB (overactive bladder) N32.81    Peripheral polyneuropathy G62.9    RLS (restless legs syndrome) G25.81    Gastroesophageal reflux disease K21.9       Past Medical History:   Diagnosis Date    AF (paroxysmal atrial fibrillation) ([facility] 04/2021    Asthma     DJD (degenerative joint disease)     Fibromyalgia     GERD (gastroesophageal reflux disease)     Hypertension     Ill-defined condition     cholestrol    Menopause     age 38    Migraine     Rheumatoid arthritis ([facility]     Sleep disorder     sleep apnea uses CPAP at night         Current Outpatient Medications:     aspirin 81 mg chewable tablet, Take 81 mg by mouth daily. , Disp: , Rfl:     venlafaxine (EFFEXOR) 37.5 mg tablet, Take 37.5 mg by mouth three (3) times daily. , Disp: , Rfl:     galcanezumab-gnlm (Emgality Syringe) 120 mg/mL syrg, by SubCUTAneous route., Disp: , Rfl:     diclofenac EC (VOLTAREN) 75 mg EC tablet, Take  by mouth., Disp: , Rfl:     omega 3-dha-epa-fish oil 100-160-1,000 mg cap, Take  by mouth., Disp: , Rfl:     alendronate-vitamin d3 70 mg- 2,800 unit per tablet, Take 1 Tablet by mouth every seven (7) days. , Disp: , Rfl:     Calcium-Cholecalciferol, D3, (Calcium 600 with Vitamin D3) 600 mg-10 mcg (400 unit) chew, Take  by mouth., Disp: , Rfl:     metoprolol tartrate (LOPRESSOR) 50 mg tablet, Take 1 Tablet by mouth two (2) times a day., Disp: 180 Tablet, Rfl: 1    solifenacin (VESICARE) 10 mg tablet, Take 1 Tablet by mouth daily. , Disp: 90 Tablet, Rfl: 0    meloxicam (MOBIC) 7.5 mg tablet, Take 7.5 mg by mouth daily. , Disp: , Rfl:     Omeprazole delayed release (PRILOSEC D/R) 20 mg tablet, Take 20 mg by mouth daily. , Disp: , Rfl:     atorvastatin (LIPITOR) 20 mg tablet, Take 20 mg by mouth daily. , Disp: , Rfl:     gabapentin (NEURONTIN) 300 mg capsule, Take 300 mg by mouth nightly., Disp: , Rfl:     topiramate (TOPAMAX) 50 mg tablet, Take 50 mg by mouth nightly., Disp: , Rfl:     albuterol (PROVENTIL HFA, VENTOLIN HFA, PROAIR HFA) 90 mcg/actuation inhaler, Take 2 Puffs by inhalation every four (4) hours as needed for Wheezing., Disp: , Rfl:     beclomethasone (BECONASE AQ) 42 mcg (0.042 %) nasal spray, 2 Puffs by Both Nostrils route two (2) times a day. Indications: inflammation of the nose due to an allergy, Disp: , Rfl:     cetirizine (ZYRTEC) 10 mg tablet, Take 10 mg by mouth., Disp: , Rfl:     Allergies   Allergen Reactions    Ace Inhibitors Angioedema    Aspirin Other (comments)    Other Medication Rash     Rash reaction to wipes used for cleansing prior to eye surgery.     Penicillins Hives    Sulfa (Sulfonamide Antibiotics) Unable to Obtain       Past Surgical History:   Procedure Laterality Date    HX CATARACT REMOVAL Right 2020    HX HEENT      strabismus correction       Social History     Tobacco Use   Smoking Status Former    Packs/day: 0.25    Types: Cigarettes    Quit date:     Years since quittin.0   Smokeless Tobacco Never   Tobacco Comments    social smoker       Social History     Socioeconomic History    Marital status:    Tobacco Use    Smoking status: Former     Packs/day: 0.25     Types: Cigarettes     Quit date:      Years since quittin.0    Smokeless tobacco: Never    Tobacco comments:     social smoker   Vaping Use    Vaping Use: Never used   Substance and Sexual Activity    Alcohol use: Not Currently     Alcohol/week: 2.0 standard drinks     Types: 2 Glasses of wine per week     Comment: socially    Drug use: No    Sexual activity: Never       Family History   Problem Relation Age of Onset    Diabetes Mother     Hypertension Sister        ROS:  Gen: denies fever, chills, or fatigue  HEENT:denies H/A, nasal congestion, ear pain, or sore throat  Resp: + dyspnea on exertion, denies cough or wheezing  CV: denies chest pain, pressure, or palpitations  Extremeties: + swelling in both feet  GI[de-identified] denies abdominal pain, nausea, vomiting, diarrhea, or constipation  : denies dysuria or hematuria, +urinary frequency with occas incontinence  Musculoskeletal: +chronic generalized joint pain, stiffness, and swelling  Neuro: + numbness/tingling in hands and feet, denies dizziness, extremity weakness, tremor, or seizure activity  Skin: denies rashes or new lesions   Psych: denies anxiety, +depression    Objective:     Visit Vitals  /78 (BP 1 Location: Left upper arm)   Pulse 64   Temp 98.1 °F (36.7 °C) (Oral)   Resp 20   Ht 5' 6\" (1.676 m)   Wt 260 lb 12.8 oz (118.3 kg)   SpO2 99%   BMI 42.09 kg/m²       General: Alert and oriented. No acute distress. +Obese  HEENT :   Eyes: Sclera white, conjunctiva clear. PERRLA. Extra ocular movements intact. Neck: Supple with FROM. No thyroid-megaly, carotid bruits, or lymphadenopathy  Lungs: Breathing even and unlabored. All lobes clear to auscultation bilaterally   Heart :RRR, S1 and S2 normal intensity, no extra heart sounds  Extremities: +mild non-pitting edema to both feet   Musculoskeletal: +joint swelling to both hands   Neuro: Cranial nerves grossly normal.  Psych: Mood and thought content appropriate for situation. Dressed appropriately and with good hygiene. Skin: Warm, dry, and intact. No lesions or discoloration. Assessment/ Plan:     Asthma  Reports frequent CORADO  Get CXR  Breo not covered by [employer] sent for Advair- use as directed  Cont Albuterol prn  F/U 1 month    HTN  BP at goal today  Pt reports it is lower at home, as well. Cont Metoprolol and HCTZ  Limit sodium in the diet  F/U 3 months     A-fib  Per cardiology    HLD  Recent LDL elevated  Increase Lipitor from 20 to 40mg daily- new script sent  Low fat diet  F/U 3 months        Verbal and written instructions (see AVS) provided.   Patient expresses understanding of diagnosis and treatment plan.    Health Maintenance Due   Topic Date Due    Hepatitis C Screening  Never done    COVID-19 Vaccine (1) Never done    Pneumococcal 0-64 years (1 - PCV) Never done    Colorectal Cancer Screening Combo  Never done    Shingles Vaccine (1 of 2) Never done    Breast Cancer Screen Mammogram  01/28/2022    DTaP/Tdap/Td series (2 - Td or Tdap) 07/24/2022               Chapis Andrews, NP

## 2023-01-12 NOTE — PROGRESS NOTES
Identified pt with two pt identifiers(name and ). Reviewed record in preparation for visit and have obtained necessary documentation. Chief Complaint   Patient presents with    New Patient     Referred by stone, np    Irregular Heart Beat      Vitals:    23 1140   BP: 98/62   Pulse: 68   Resp: 18   Temp: 98.2 °F (36.8 °C)   TempSrc: Temporal   SpO2: 98%   Weight: 261 lb (118.4 kg)   Height: 5' 6\" (1.676 m)   PainSc:   0 - No pain       Medications reviewed/approved by provider. Health Maintenance Review: Patient reminded of \"due or due soon\" health maintenance. I have asked the patient to contact his/her primary care provider (PCP) for follow-up on his/her health maintenance. Coordination of Care Questionnaire:  :   1) Have you been to an emergency room, urgent care, or hospitalized since your last visit? If yes, where when, and reason for visit? no       2. Have seen or consulted any other health care provider since your last visit? If yes, where when, and reason for visit? NO      Patient is accompanied by self I have received verbal consent from Sharri Bai to discuss any/all medical information while they are present in the room.

## 2023-01-12 NOTE — PROGRESS NOTES
Identified pt with two pt identifiers(name and ). Reviewed record in preparation for visit and have obtained necessary documentation. Chief Complaint   Patient presents with    Shortness of Breath       1. \"Have you been to the ER, urgent care clinic since your last visit? Hospitalized since your last visit? \" No    2. \"Have you seen or consulted any other health care providers outside of the [address] [place] [address] since your last visit? \" No     3. For patients aged 39-70: Has the patient had a colonoscopy / FIT/ Cologuard? No      If the patient is female:    4. For patients aged 41-77: Has the patient had a mammogram within the past 2 years? No      5. For patients aged 21-65: Has the patient had a pap smear?  Yes, No Care Gap Present

## 2023-01-13 ENCOUNTER — CLINICAL SUPPORT (OUTPATIENT)
Dept: CARDIOLOGY CLINIC | Age: 63
End: 2023-01-13

## 2023-01-13 DIAGNOSIS — I48.91 ATRIAL FIBRILLATION, UNSPECIFIED TYPE (HCC): ICD-10-CM

## 2023-01-13 DIAGNOSIS — R00.2 PALPITATIONS: Primary | ICD-10-CM

## 2023-01-13 RX ORDER — PHENOL/SODIUM PHENOLATE
20 AEROSOL, SPRAY (ML) MUCOUS MEMBRANE DAILY
Qty: 90 TABLET | Refills: 3 | Status: SHIPPED | OUTPATIENT
Start: 2023-01-13

## 2023-01-13 RX ORDER — CETIRIZINE HYDROCHLORIDE 10 MG/1
10 TABLET ORAL DAILY
Qty: 90 TABLET | Refills: 3 | Status: SHIPPED | OUTPATIENT
Start: 2023-01-13

## 2023-01-13 RX ORDER — SOLIFENACIN SUCCINATE 10 MG/1
10 TABLET, FILM COATED ORAL DAILY
Qty: 90 TABLET | Refills: 1 | Status: SHIPPED | OUTPATIENT
Start: 2023-01-13

## 2023-01-16 ENCOUNTER — TELEPHONE (OUTPATIENT)
Dept: FAMILY MEDICINE CLINIC | Age: 63
End: 2023-01-16

## 2023-01-16 NOTE — TELEPHONE ENCOUNTER
Prior auth needed for Omeprazole 20 MG Cap. Go to [employer]/priorauthportal and enter TRX code [id number].

## 2023-01-16 NOTE — TELEPHONE ENCOUNTER
Per [employer] medication will be covered if sent as 75mg 1 q day and 37.5mg 1 q day other wise medication will not be covered sent in as Effexor XR 37.5mg 3 caps once daily

## 2023-01-17 RX ORDER — VENLAFAXINE HYDROCHLORIDE 75 MG/1
CAPSULE, EXTENDED RELEASE ORAL
Qty: 90 CAPSULE | Refills: 1 | Status: SHIPPED | OUTPATIENT
Start: 2023-01-17

## 2023-01-17 RX ORDER — VENLAFAXINE HYDROCHLORIDE 37.5 MG/1
CAPSULE, EXTENDED RELEASE ORAL
Qty: 90 CAPSULE | Refills: 1 | Status: SHIPPED | OUTPATIENT
Start: 2023-01-17

## 2023-01-17 NOTE — TELEPHONE ENCOUNTER
Received fax from [employer] Omeprazole has been approved from 01/16/2023 through 01/16/2024. Faxed to [facility].

## 2023-01-19 ENCOUNTER — TELEPHONE (OUTPATIENT)
Dept: FAMILY MEDICINE CLINIC | Age: 63
End: 2023-01-19

## 2023-01-27 ENCOUNTER — HOSPITAL ENCOUNTER (OUTPATIENT)
Dept: ULTRASOUND IMAGING | Age: 63
Discharge: HOME | End: 2023-01-27
Attending: NURSE PRACTITIONER
Payer: MEDICAID

## 2023-01-27 ENCOUNTER — HOSPITAL ENCOUNTER (OUTPATIENT)
Dept: GENERAL RADIOLOGY | Age: 63
End: 2023-01-27
Attending: NURSE PRACTITIONER
Payer: MEDICAID

## 2023-01-27 DIAGNOSIS — I48.0 PAROXYSMAL ATRIAL FIBRILLATION (HCC): ICD-10-CM

## 2023-01-27 DIAGNOSIS — R00.2 INTERMITTENT PALPITATIONS: ICD-10-CM

## 2023-01-27 DIAGNOSIS — G47.33 OBSTRUCTIVE SLEEP APNEA (ADULT) (PEDIATRIC): ICD-10-CM

## 2023-01-27 DIAGNOSIS — I10 ESSENTIAL HYPERTENSION: ICD-10-CM

## 2023-01-27 DIAGNOSIS — R06.02 SOB (SHORTNESS OF BREATH): ICD-10-CM

## 2023-01-27 DIAGNOSIS — I49.1 PAC (PREMATURE ATRIAL CONTRACTION): ICD-10-CM

## 2023-01-27 DIAGNOSIS — E78.2 MIXED HYPERLIPIDEMIA: ICD-10-CM

## 2023-01-27 PROCEDURE — 93306 TTE W/DOPPLER COMPLETE: CPT

## 2023-01-27 PROCEDURE — 71046 X-RAY EXAM CHEST 2 VIEWS: CPT

## 2023-01-28 LAB
ECHO AO ROOT DIAM: 3.2 CM
ECHO AV PEAK GRADIENT: 5 MMHG
ECHO AV PEAK VELOCITY: 1.1 M/S
ECHO EST RA PRESSURE: 10 MMHG
ECHO LA DIAMETER: 3.8 CM
ECHO LA TO AORTIC ROOT RATIO: 1.19
ECHO LA VOL 2C: 72 ML (ref 22–52)
ECHO LA VOL 4C: 57 ML (ref 22–52)
ECHO LA VOLUME AREA LENGTH: 69 ML
ECHO LV E' SEPTAL VELOCITY: 7 CM/S
ECHO LV FRACTIONAL SHORTENING: 36 % (ref 28–44)
ECHO LV INTERNAL DIMENSION DIASTOLIC: 4.5 CM (ref 3.9–5.3)
ECHO LV INTERNAL DIMENSION SYSTOLIC: 2.9 CM
ECHO LV IVSD: 0.9 CM (ref 0.6–0.9)
ECHO LV MASS 2D: 132.8 G (ref 67–162)
ECHO LV POSTERIOR WALL DIASTOLIC: 0.9 CM (ref 0.6–0.9)
ECHO LV RELATIVE WALL THICKNESS RATIO: 0.4
ECHO LVOT AREA: 5.3 CM2
ECHO LVOT DIAM: 2.6 CM
ECHO MV A VELOCITY: 0.83 M/S
ECHO MV E DECELERATION TIME (DT): 241 MS
ECHO MV E VELOCITY: 0.66 M/S
ECHO MV E/A RATIO: 0.8
ECHO MV E/E' SEPTAL: 9.43
ECHO RIGHT VENTRICULAR SYSTOLIC PRESSURE (RVSP): 29 MMHG
ECHO TV REGURGITANT MAX VELOCITY: 2.2 M/S
ECHO TV REGURGITANT PEAK GRADIENT: 19 MMHG

## 2023-01-28 PROCEDURE — 93306 TTE W/DOPPLER COMPLETE: CPT | Performed by: INTERNAL MEDICINE

## 2023-01-28 NOTE — PROGRESS NOTES
Echo showed NORMAL heart pumping strength,  valves look good and healthy.     Continue same meds, will see her in July

## 2023-01-30 NOTE — PROGRESS NOTES
Patient identified by Name and Date of birth. Results given as directed by provider.      Patient daughtersetting up appointment to have additional allergy testing done

## 2023-01-30 NOTE — PROGRESS NOTES
CXR shows lung hyperinflation consistent with asthma, proceed with new inhaler Breo and follow up as planned

## 2023-02-09 ENCOUNTER — TELEPHONE (OUTPATIENT)
Dept: CARDIOLOGY CLINIC | Age: 63
End: 2023-02-09

## 2023-02-09 ENCOUNTER — OFFICE VISIT (OUTPATIENT)
Dept: SURGERY | Age: 63
End: 2023-02-09

## 2023-02-09 VITALS
OXYGEN SATURATION: 99 % | RESPIRATION RATE: 18 BRPM | BODY MASS INDEX: 41.78 KG/M2 | SYSTOLIC BLOOD PRESSURE: 123 MMHG | WEIGHT: 260 LBS | TEMPERATURE: 97.7 F | HEIGHT: 66 IN | DIASTOLIC BLOOD PRESSURE: 87 MMHG | HEART RATE: 62 BPM

## 2023-02-09 DIAGNOSIS — Z12.11 ENCOUNTER FOR SCREENING COLONOSCOPY: Primary | ICD-10-CM

## 2023-02-09 NOTE — TELEPHONE ENCOUNTER
----- Message from Juan Urban NP sent at 1/28/2023  6:01 PM EST -----  Echo showed NORMAL heart pumping strength,  valves look good and healthy. Continue same meds, will see her in July. Spoke with the patient. Verified patient with two patient identifiers. Results given and questions answered. Patient verbalized understanding.

## 2023-02-09 NOTE — PROGRESS NOTES
Subjective:      Leonarda Shanks is an 60 y.o. obese AAF with history of Afib/asthma/GERD/ sleep apnea who is referred for a screening colonoscopy. This would be her first screening test for colon cancer. She denies any significant change in bowel habits. She denies any blood per rectum or pain with bowel movements. She denies any weight loss. She has no family history of IBD/ colon ca. She had a lap gracie for symptomatic cholelithiasis. She is rate controlled for her afib and is not on anticoagulation. She requires a CPAP machine and has 2 pillow orthopnea. New Patient  Pertinent negatives include no chest pain, no abdominal pain, no headaches and no shortness of breath. Colon Cancer Screening  Pertinent negatives include no chest pain, no abdominal pain, no headaches and no shortness of breath.       Patient Active Problem List   Diagnosis Code    Essential hypertension I10    Paroxysmal atrial fibrillation (HCC) I48.0    BURTON (obstructive sleep apnea) G47.33    Class 3 severe obesity due to excess calories without serious comorbidity in adult [facility]) E66.01    Fibromyalgia M79.7    Migraine G43.909    DJD (degenerative joint disease) M19.90    Cholelithiasis K80.20    Mild intermittent asthma without complication J51.71    Hyperlipidemia E78.5    Vitamin D deficiency E55.9    Moderate episode of recurrent major depressive disorder (HCC) F33.1    OAB (overactive bladder) N32.81    Peripheral polyneuropathy G62.9    RLS (restless legs syndrome) G25.81    Gastroesophageal reflux disease K21.9     Patient Active Problem List    Diagnosis Date Noted    Mild intermittent asthma without complication 76/15/4938    Hyperlipidemia 12/02/2022    Vitamin D deficiency 12/02/2022    Moderate episode of recurrent major depressive disorder ([facility] 12/02/2022    OAB (overactive bladder) 12/02/2022    Peripheral polyneuropathy 12/02/2022    RLS (restless legs syndrome) 12/02/2022    Gastroesophageal reflux disease 12/02/2022    Cholelithiasis 09/17/2021    Essential hypertension 04/20/2021    Paroxysmal atrial fibrillation ([address] 04/20/2021    BURTON (obstructive sleep apnea) 04/20/2021    Class 3 severe obesity due to excess calories without serious comorbidity in adult [facility]) 04/20/2021    Fibromyalgia     Migraine     DJD (degenerative joint disease)      Current Outpatient Medications   Medication Sig Dispense Refill    venlafaxine-SR (EFFEXOR-XR) 75 mg capsule Take 1 pill daily with a 37.5mg pill 90 Capsule 1    Omeprazole delayed release (PRILOSEC D/R) 20 mg tablet Take 1 Tablet by mouth daily. 90 Tablet 3    cetirizine (ZYRTEC) 10 mg tablet Take 1 Tablet by mouth daily. 90 Tablet 3    solifenacin (VESICARE) 10 mg tablet Take 1 Tablet by mouth daily. 90 Tablet 1    aspirin 81 mg chewable tablet Take 81 mg by mouth daily. atorvastatin (LIPITOR) 40 mg tablet Take 1 Tablet by mouth daily. 90 Tablet 0    fluticasone propion-salmeteroL (ADVAIR/WIXELA) 100-50 mcg/dose diskus inhaler Take 1 Puff by inhalation two (2) times a day. 60 Each 0    galcanezumab-gnlm (Emgality Syringe) 120 mg/mL syrg by SubCUTAneous route. diclofenac EC (VOLTAREN) 75 mg EC tablet Take  by mouth. omega 3-dha-epa-fish oil 100-160-1,000 mg cap Take  by mouth. alendronate-vitamin d3 70 mg- 2,800 unit per tablet Take 1 Tablet by mouth every seven (7) days. Calcium-Cholecalciferol, D3, (Calcium 600 with Vitamin D3) 600 mg-10 mcg (400 unit) chew Take  by mouth.      metoprolol tartrate (LOPRESSOR) 50 mg tablet Take 1 Tablet by mouth two (2) times a day. 180 Tablet 1    meloxicam (MOBIC) 7.5 mg tablet Take 7.5 mg by mouth daily. gabapentin (NEURONTIN) 300 mg capsule Take 300 mg by mouth nightly. topiramate (TOPAMAX) 50 mg tablet Take 50 mg by mouth nightly. albuterol (PROVENTIL HFA, VENTOLIN HFA, PROAIR HFA) 90 mcg/actuation inhaler Take 2 Puffs by inhalation every four (4) hours as needed for Wheezing.       beclomethasone (BECONASE AQ) 42 mcg (0.042 %) nasal spray 2 Puffs by Both Nostrils route two (2) times a day. Indications: inflammation of the nose due to an allergy      venlafaxine-SR (EFFEXOR-XR) 37.5 mg capsule Take 1 pill daily with a 75mg pill 90 Capsule 1     Allergies   Allergen Reactions    Ace Inhibitors Angioedema    Aspirin Other (comments)    Other Medication Rash     Rash reaction to wipes used for cleansing prior to eye surgery. Penicillins Hives    Sulfa (Sulfonamide Antibiotics) Unable to Obtain    Sulfur Hives     Reaction Type: Allergy     Past Medical History:   Diagnosis Date    AF (paroxysmal atrial fibrillation) ([facility] 04/2021    Asthma     DJD (degenerative joint disease)     Fibromyalgia     GERD (gastroesophageal reflux disease)     Hypertension     Ill-defined condition     cholestrol    Menopause     age 38    Migraine     Rheumatoid arthritis ([facility]     Sleep disorder     sleep apnea uses CPAP at night     Past Surgical History:   Procedure Laterality Date    HX CATARACT REMOVAL Right 09/05/2020    HX HEENT  2021    strabismus correction     Family History   Problem Relation Age of Onset    Diabetes Mother     Hypertension Sister      Social History     Tobacco Use    Smoking status: Never    Smokeless tobacco: Never    Tobacco comments:     social smoker   Substance Use Topics    Alcohol use: Yes     Alcohol/week: 2.0 standard drinks     Types: 2 Glasses of wine per week     Comment: socially        Review of Systems  Review of Systems   Constitutional:  Negative for chills, fever, malaise/fatigue and weight loss. HENT:  Negative for congestion and sore throat. Respiratory:  Negative for cough and shortness of breath. Cardiovascular:  Positive for orthopnea. Negative for chest pain and leg swelling. Gastrointestinal:  Positive for heartburn. Negative for abdominal pain, blood in stool, constipation, diarrhea, nausea and vomiting. Musculoskeletal:  Positive for joint pain and myalgias. Negative for back pain and neck pain. Skin:  Negative for rash. Neurological:  Negative for weakness and headaches. Objective:     Visit Vitals  /87 (BP 1 Location: Left lower arm, BP Patient Position: Sitting, BP Cuff Size: Adult)   Pulse 62   Temp 97.7 °F (36.5 °C) (Temporal)   Resp 18   Ht 5' 6\" (1.676 m)   Wt 260 lb (117.9 kg)   SpO2 99%   BMI 41.97 kg/m²     Physical Exam  Constitutional:       General: She is not in acute distress. Appearance: Normal appearance. She is obese. She is not ill-appearing or toxic-appearing. HENT:      Head: Normocephalic and atraumatic. Mouth/Throat:      Mouth: Mucous membranes are moist.      Pharynx: Oropharynx is clear. Eyes:      General: No scleral icterus. Cardiovascular:      Rate and Rhythm: Rhythm irregular. Pulmonary:      Effort: Pulmonary effort is normal. No respiratory distress. Breath sounds: No wheezing. Abdominal:      General: There is no distension. Palpations: Abdomen is soft. Tenderness: There is no abdominal tenderness. Musculoskeletal:      Cervical back: Normal range of motion and neck supple. Comments: Decreased ROM in her lower extremities with unsteady gait without a cane   Lymphadenopathy:      Cervical: No cervical adenopathy. Skin:     General: Skin is warm. Coloration: Skin is not jaundiced. Neurological:      General: No focal deficit present. Mental Status: She is alert and oriented to person, place, and time. Assessment/Plan:   60year old obese AAF with multiple medical co-morbidities requiring a screening colonoscopy. - I discussed the procedure in detail with patient and daughter. I also discussed risk factors, with hypoxia being the most significant secondary to body habitus, orthopnea, sleep apnea, and asthma. The daughter wishes for the procedure to be done at a larger center. - I will refer patient to Dr Zachariah Barnes in [facility].   I provided the patient with his information and will have my office assist in setting up the office visit.

## 2023-02-09 NOTE — PROGRESS NOTES
Identified pt with two pt identifiers (name and ). Reviewed chart in preparation for visit and have obtained necessary documentation. Kyle Leija is a 60 y.o. female  Chief Complaint   Patient presents with    New Patient    Colon Cancer Screening     There were no vitals taken for this visit. 1. Have you been to the ER, urgent care clinic since your last visit? Hospitalized since your last visit? No    2. Have you seen or consulted any other health care providers outside of the [address] [place] [address] since your last visit? Include any pap smears or colon screening.  No

## 2023-02-15 ENCOUNTER — OFFICE VISIT (OUTPATIENT)
Dept: FAMILY MEDICINE CLINIC | Age: 63
End: 2023-02-15
Payer: MEDICAID

## 2023-02-15 VITALS
BODY MASS INDEX: 42.27 KG/M2 | OXYGEN SATURATION: 97 % | WEIGHT: 263 LBS | SYSTOLIC BLOOD PRESSURE: 138 MMHG | RESPIRATION RATE: 18 BRPM | HEART RATE: 80 BPM | HEIGHT: 66 IN | DIASTOLIC BLOOD PRESSURE: 84 MMHG

## 2023-02-15 DIAGNOSIS — J45.40 MODERATE PERSISTENT ASTHMA WITHOUT COMPLICATION: Primary | ICD-10-CM

## 2023-02-15 PROCEDURE — 3079F DIAST BP 80-89 MM HG: CPT | Performed by: NURSE PRACTITIONER

## 2023-02-15 PROCEDURE — 99213 OFFICE O/P EST LOW 20 MIN: CPT | Performed by: NURSE PRACTITIONER

## 2023-02-15 PROCEDURE — 3075F SYST BP GE 130 - 139MM HG: CPT | Performed by: NURSE PRACTITIONER

## 2023-02-15 NOTE — PROGRESS NOTES
Subjective:     CC: allergies    Artemio Geurrero is a 60 y.o. female who presents today for a 1 week follow up for SOB. She transferred from the [facility] 7 weeks ago. She is a poor historian. Asthma  At the last OV she reported frequent SOB and Albuterol use. She was prescribed Advair but has only been using it as needed and stopped using her Albuterol inhaler. Breo was not covered. CXR showed hyperinflation but no residual PNA infection or mass (She caught PNA back in ). Today she denies any coughing or wheezing today. Black mold issue at home. Someone cleaned it last year. Daughter concerned that mold not gone. Wants allergy testing.  She is on Zyrtec so she will stop it today and follow up in 1 week for the test.           Patient Active Problem List   Diagnosis Code    Essential hypertension I10    Paroxysmal atrial fibrillation (HCC) I48.0    BURTON (obstructive sleep apnea) G47.33    Class 3 severe obesity due to excess calories without serious comorbidity in adult [facility]) E66.01    Fibromyalgia M79.7    Migraine G43.909    DJD (degenerative joint disease) M19.90    Cholelithiasis K80.20    Mild intermittent asthma without complication B96.97    Hyperlipidemia E78.5    Vitamin D deficiency E55.9    Moderate episode of recurrent major depressive disorder (HCC) F33.1    OAB (overactive bladder) N32.81    Peripheral polyneuropathy G62.9    RLS (restless legs syndrome) G25.81    Gastroesophageal reflux disease K21.9       Past Medical History:   Diagnosis Date    [facility] 04/2021    Asthma     DJD (degenerative joint disease)     Fibromyalgia     GERD (gastroesophageal reflux disease)     Hypertension     Ill-defined condition     cholestrol    Menopause     age 38    Migraine     Rheumatoid arthritis ([facility]     Sleep disorder     sleep apnea uses CPAP at night         Current Outpatient Medications:     venlafaxine-SR (EFFEXOR-XR) 75 mg capsule, Take 1 pill daily with a 37.5mg pill, Disp: 90 Capsule, Rfl: 1    venlafaxine-SR (EFFEXOR-XR) 37.5 mg capsule, Take 1 pill daily with a 75mg pill, Disp: 90 Capsule, Rfl: 1    Omeprazole delayed release (PRILOSEC D/R) 20 mg tablet, Take 1 Tablet by mouth daily. , Disp: 90 Tablet, Rfl: 3    cetirizine (ZYRTEC) 10 mg tablet, Take 1 Tablet by mouth daily. , Disp: 90 Tablet, Rfl: 3    solifenacin (VESICARE) 10 mg tablet, Take 1 Tablet by mouth daily. , Disp: 90 Tablet, Rfl: 1    aspirin 81 mg chewable tablet, Take 81 mg by mouth daily. , Disp: , Rfl:     atorvastatin (LIPITOR) 40 mg tablet, Take 1 Tablet by mouth daily. , Disp: 90 Tablet, Rfl: 0    fluticasone propion-salmeteroL (ADVAIR/WIXELA) 100-50 mcg/dose diskus inhaler, Take 1 Puff by inhalation two (2) times a day., Disp: 60 Each, Rfl: 0    galcanezumab-gnlm (Emgality Syringe) 120 mg/mL syrg, by SubCUTAneous route., Disp: , Rfl:     diclofenac EC (VOLTAREN) 75 mg EC tablet, Take  by mouth., Disp: , Rfl:     omega 3-dha-epa-fish oil 100-160-1,000 mg cap, Take  by mouth., Disp: , Rfl:     alendronate-vitamin d3 70 mg- 2,800 unit per tablet, Take 1 Tablet by mouth every seven (7) days. , Disp: , Rfl:     Calcium-Cholecalciferol, D3, (Calcium 600 with Vitamin D3) 600 mg-10 mcg (400 unit) chew, Take  by mouth., Disp: , Rfl:     metoprolol tartrate (LOPRESSOR) 50 mg tablet, Take 1 Tablet by mouth two (2) times a day., Disp: 180 Tablet, Rfl: 1    meloxicam (MOBIC) 7.5 mg tablet, Take 7.5 mg by mouth daily. , Disp: , Rfl:     gabapentin (NEURONTIN) 300 mg capsule, Take 300 mg by mouth nightly., Disp: , Rfl:     topiramate (TOPAMAX) 50 mg tablet, Take 50 mg by mouth nightly., Disp: , Rfl:     albuterol (PROVENTIL HFA, VENTOLIN HFA, PROAIR HFA) 90 mcg/actuation inhaler, Take 2 Puffs by inhalation every four (4) hours as needed for Wheezing., Disp: , Rfl:     beclomethasone (BECONASE AQ) 42 mcg (0.042 %) nasal spray, 2 Puffs by Both Nostrils route two (2) times a day.  Indications: inflammation of the nose due to an allergy, Disp: , Rfl:     Allergies   Allergen Reactions    Ace Inhibitors Angioedema    Aspirin Other (comments)    Other Medication Rash     Rash reaction to wipes used for cleansing prior to eye surgery. Penicillins Hives    Sulfa (Sulfonamide Antibiotics) Unable to Obtain    Sulfur Hives     Reaction Type: Allergy       Past Surgical History:   Procedure Laterality Date    HX CATARACT REMOVAL Right 09/05/2020    HX HEENT  2021    strabismus correction       Social History     Tobacco Use   Smoking Status Never   Smokeless Tobacco Never   Tobacco Comments    social smoker       Social History     Socioeconomic History    Marital status:    Tobacco Use    Smoking status: Never    Smokeless tobacco: Never    Tobacco comments:     social smoker   Vaping Use    Vaping Use: Never used   Substance and Sexual Activity    Alcohol use:  Yes     Alcohol/week: 2.0 standard drinks     Types: 2 Glasses of wine per week     Comment: socially    Drug use: No    Sexual activity: Not Currently     Partners: Male     Social Determinants of Health     Financial Resource Strain: Low Risk     Difficulty of Paying Living Expenses: Not very hard   Food Insecurity: No Food Insecurity    Worried About Running Out of Food in the Last Year: Never true    Ran Out of Food in the Last Year: Never true       Family History   Problem Relation Age of Onset    Diabetes Mother     Hypertension Sister        ROS:  Gen: denies fever, chills, or fatigue  HEENT:denies H/A, nasal congestion, ear pain, or sore throat  Resp: + dyspnea on exertion, denies cough or wheezing  CV: denies chest pain, pressure, or palpitations  Extremeties: + swelling in both feet  GI[de-identified] denies abdominal pain, nausea, vomiting, diarrhea, or constipation  : denies dysuria or hematuria, +urinary frequency with occas incontinence  Musculoskeletal: +chronic generalized joint pain, stiffness, and swelling  Neuro: + numbness/tingling in hands and feet, denies dizziness, extremity weakness, tremor, or seizure activity  Skin: denies rashes or new lesions   Psych: denies anxiety, +depression    Objective:     Visit Vitals  /84 (BP 1 Location: Left upper arm, BP Patient Position: At rest, BP Cuff Size: Adult)   Pulse 80   Resp 18   Ht 5' 6\" (1.676 m)   Wt 263 lb (119.3 kg)   SpO2 97%   BMI 42.45 kg/m²       General: Alert and oriented. No acute distress. +Obese  HEENT :   Eyes: Sclera white, conjunctiva clear. PERRLA. Extra ocular movements intact. Neck: Supple with FROM. No thyroid-megaly, carotid bruits, or lymphadenopathy  Lungs: Breathing even and unlabored. All lobes clear to auscultation bilaterally   Heart :RRR, S1 and S2 normal intensity, no extra heart sounds  Extremities: +mild non-pitting edema to both feet   Musculoskeletal: +joint swelling to both hands   Neuro: Cranial nerves grossly normal.  Psych: Mood and thought content appropriate for situation. Dressed appropriately and with good hygiene. Skin: Warm, dry, and intact. No lesions or discoloration. Assessment/ Plan:     Asthma, moderate persistent  Cont Advair- use as directed  Cont Albuterol prn  Need to get allergy testing- concern for mold allergy  Pt advised to stop her Zyrtec for 1 week and RTO for allergy panel via bloodwork. F/U with me 3 months        Verbal and written instructions (see AVS) provided. Patient expresses understanding of diagnosis and treatment plan. Health Maintenance Due   Topic Date Due    Hepatitis C Screening  Never done    COVID-19 Vaccine (1) Never done    Pneumococcal 0-64 years (1 - PCV) Never done    Colorectal Cancer Screening Combo  Never done    Shingles Vaccine (1 of 2) Never done    Breast Cancer Screen Mammogram  01/28/2022    DTaP/Tdap/Td series (2 - Td or Tdap) 07/24/2022               Joel Live NP

## 2023-02-15 NOTE — PROGRESS NOTES
1. \"Have you been to the ER, urgent care clinic since your last visit? Hospitalized since your last visit? \" No    2. \"Have you seen or consulted any other health care providers outside of the [address] [place] [address] since your last visit? \" No     3. For patients aged 39-70: Has the patient had a colonoscopy / FIT/ Cologuard? No      If the patient is female:    4. For patients aged 41-77: Has the patient had a mammogram within the past 2 years? Yes - no Care Gap present      5. For patients aged 21-65: Has the patient had a pap smear?  NA - based on age or sex

## 2023-02-23 ENCOUNTER — LAB ONLY (OUTPATIENT)
Dept: FAMILY MEDICINE CLINIC | Age: 63
End: 2023-02-23
Payer: MEDICAID

## 2023-02-23 DIAGNOSIS — T78.40XA ALLERGIC DISORDER, INITIAL ENCOUNTER: Primary | ICD-10-CM

## 2023-02-23 PROCEDURE — 36415 COLL VENOUS BLD VENIPUNCTURE: CPT | Performed by: NURSE PRACTITIONER

## 2023-03-01 ENCOUNTER — OFFICE VISIT (OUTPATIENT)
Dept: FAMILY MEDICINE CLINIC | Age: 63
End: 2023-03-01
Payer: MEDICAID

## 2023-03-01 VITALS
OXYGEN SATURATION: 96 % | HEIGHT: 66 IN | WEIGHT: 261 LBS | BODY MASS INDEX: 41.95 KG/M2 | DIASTOLIC BLOOD PRESSURE: 76 MMHG | SYSTOLIC BLOOD PRESSURE: 123 MMHG | RESPIRATION RATE: 18 BRPM | HEART RATE: 67 BPM

## 2023-03-01 DIAGNOSIS — R23.2 HOT FLASHES: ICD-10-CM

## 2023-03-01 DIAGNOSIS — J45.40 MODERATE PERSISTENT ASTHMA WITHOUT COMPLICATION: Primary | ICD-10-CM

## 2023-03-01 PROCEDURE — 3078F DIAST BP <80 MM HG: CPT | Performed by: NURSE PRACTITIONER

## 2023-03-01 PROCEDURE — 99214 OFFICE O/P EST MOD 30 MIN: CPT | Performed by: NURSE PRACTITIONER

## 2023-03-01 PROCEDURE — 3074F SYST BP LT 130 MM HG: CPT | Performed by: NURSE PRACTITIONER

## 2023-03-01 NOTE — PROGRESS NOTES
Subjective:     CC: SOB, hot flashes    Irene Hernandez is a 60 y.o. female who presents today to follow up for chronic SOB and she has a new complaint of hot flashes. She transferred from the [facility] a couple of months ago. Her daughter is accompanying her today. Her chronic SOB is most likely related to her asthma. CXR showed hyperinflation. At a previous appt she reported using her Albuterol frequently. She was prescribed Advair and was advised to use is every day twice a day but she has only been using it as needed. Breo was not covered. SOB continues today. Of note ECHO was done 1-2023, it was normal. We are still waiting on the results of her cardiac event monitor. Daughter reports there is also a \"black mold issue\" at home. Someone cleaned it out last year. Daughter concerned that mold is not completely gone. Wanted her mother to do allergy testing. This was done 2 weeks ago but [facility] states results are not back yet. She is on Zyrtec for allergy symptoms. Hot flashes- started 2 weeks ago. Denies fevers or chills or other s/s of infection. Denies any other associated symptoms. She has not started any new medications. I informed her that her effexor can cause excessvie sweating but she states she has been on this for a long time and does not think that is the cause. She is post menopausal. Will check TSH today and if normal will try something for postmenopausal hot flashes.        Patient Active Problem List   Diagnosis Code    Essential hypertension I10    Paroxysmal atrial fibrillation (HCC) I48.0    BURTON (obstructive sleep apnea) G47.33    Class 3 severe obesity due to excess calories without serious comorbidity in adult [facility]) E66.01    Fibromyalgia M79.7    Migraine G43.909    DJD (degenerative joint disease) M19.90    Cholelithiasis K80.20    Mild intermittent asthma without complication R00.12    Hyperlipidemia E78.5    Vitamin D deficiency E55.9    Moderate episode of recurrent major depressive disorder (HCC) F33.1    OAB (overactive bladder) N32.81    Peripheral polyneuropathy G62.9    RLS (restless legs syndrome) G25.81    Gastroesophageal reflux disease K21.9       Past Medical History:   Diagnosis Date    AF (paroxysmal atrial fibrillation) ([facility] 04/2021    Asthma     DJD (degenerative joint disease)     Fibromyalgia     GERD (gastroesophageal reflux disease)     Hypertension     Ill-defined condition     cholestrol    Menopause     age 38    Migraine     Rheumatoid arthritis (HCC)     Sleep disorder     sleep apnea uses CPAP at night         Current Outpatient Medications:     venlafaxine-SR (EFFEXOR-XR) 75 mg capsule, Take 1 pill daily with a 37.5mg pill, Disp: 90 Capsule, Rfl: 1    venlafaxine-SR (EFFEXOR-XR) 37.5 mg capsule, Take 1 pill daily with a 75mg pill, Disp: 90 Capsule, Rfl: 1    Omeprazole delayed release (PRILOSEC D/R) 20 mg tablet, Take 1 Tablet by mouth daily. , Disp: 90 Tablet, Rfl: 3    cetirizine (ZYRTEC) 10 mg tablet, Take 1 Tablet by mouth daily. , Disp: 90 Tablet, Rfl: 3    solifenacin (VESICARE) 10 mg tablet, Take 1 Tablet by mouth daily. , Disp: 90 Tablet, Rfl: 1    aspirin 81 mg chewable tablet, Take 81 mg by mouth daily. , Disp: , Rfl:     atorvastatin (LIPITOR) 40 mg tablet, Take 1 Tablet by mouth daily. , Disp: 90 Tablet, Rfl: 0    fluticasone propion-salmeteroL (ADVAIR/WIXELA) 100-50 mcg/dose diskus inhaler, Take 1 Puff by inhalation two (2) times a day., Disp: 60 Each, Rfl: 0    galcanezumab-gnlm (Emgality Syringe) 120 mg/mL syrg, by SubCUTAneous route., Disp: , Rfl:     diclofenac EC (VOLTAREN) 75 mg EC tablet, Take  by mouth., Disp: , Rfl:     omega 3-dha-epa-fish oil 100-160-1,000 mg cap, Take  by mouth., Disp: , Rfl:     alendronate-vitamin d3 70 mg- 2,800 unit per tablet, Take 1 Tablet by mouth every seven (7) days. , Disp: , Rfl:     Calcium-Cholecalciferol, D3, (Calcium 600 with Vitamin D3) 600 mg-10 mcg (400 unit) chew, Take  by mouth., Disp: , Rfl: metoprolol tartrate (LOPRESSOR) 50 mg tablet, Take 1 Tablet by mouth two (2) times a day., Disp: 180 Tablet, Rfl: 1    meloxicam (MOBIC) 7.5 mg tablet, Take 7.5 mg by mouth daily. , Disp: , Rfl:     gabapentin (NEURONTIN) 300 mg capsule, Take 300 mg by mouth nightly., Disp: , Rfl:     topiramate (TOPAMAX) 50 mg tablet, Take 50 mg by mouth nightly., Disp: , Rfl:     albuterol (PROVENTIL HFA, VENTOLIN HFA, PROAIR HFA) 90 mcg/actuation inhaler, Take 2 Puffs by inhalation every four (4) hours as needed for Wheezing., Disp: , Rfl:     beclomethasone (BECONASE AQ) 42 mcg (0.042 %) nasal spray, 2 Puffs by Both Nostrils route two (2) times a day. Indications: inflammation of the nose due to an allergy, Disp: , Rfl:     Allergies   Allergen Reactions    Ace Inhibitors Angioedema    Aspirin Other (comments)    Other Medication Rash     Rash reaction to wipes used for cleansing prior to eye surgery. Penicillins Hives    Sulfa (Sulfonamide Antibiotics) Unable to Obtain    Sulfur Hives     Reaction Type: Allergy       Past Surgical History:   Procedure Laterality Date    HX CATARACT REMOVAL Right 09/05/2020    HX HEENT  2021    strabismus correction       Social History     Tobacco Use   Smoking Status Never   Smokeless Tobacco Never   Tobacco Comments    social smoker       Social History     Socioeconomic History    Marital status:    Tobacco Use    Smoking status: Never    Smokeless tobacco: Never    Tobacco comments:     social smoker   Vaping Use    Vaping Use: Never used   Substance and Sexual Activity    Alcohol use:  Yes     Alcohol/week: 2.0 standard drinks     Types: 2 Glasses of wine per week     Comment: socially    Drug use: No    Sexual activity: Not Currently     Partners: Male     Social Determinants of Health     Financial Resource Strain: Low Risk     Difficulty of Paying Living Expenses: Not very hard   Food Insecurity: No Food Insecurity    Worried About Running Out of Food in the Last Year: Never true    Ran Out of Food in the Last Year: Never true       Family History   Problem Relation Age of Onset    Diabetes Mother     Hypertension Sister        ROS:  Gen: denies fever, chills, or fatigue  HEENT:denies H/A, nasal congestion, ear pain, or sore throat  Resp: + dyspnea on exertion, denies cough or wheezing  CV: denies chest pain, pressure, or palpitations  Extremeties: + swelling in both feet  GI[de-identified] denies abdominal pain, nausea, vomiting, diarrhea, or constipation  : denies dysuria or hematuria, +urinary frequency with occas incontinence  Musculoskeletal: +chronic generalized joint pain, stiffness, and swelling  Neuro: + numbness/tingling in hands and feet, denies dizziness, extremity weakness, tremor, or seizure activity  Skin: denies rashes or new lesions   Psych: denies anxiety, +depression    Objective:     Visit Vitals  /76 (BP 1 Location: Left upper arm, BP Patient Position: At rest, BP Cuff Size: Adult)   Pulse 67   Resp 18   Ht 5' 6\" (1.676 m)   Wt 261 lb (118.4 kg)   SpO2 96%   BMI 42.13 kg/m²       General: Alert and oriented. No acute distress. +Obese  HEENT :   Eyes: Sclera white, conjunctiva clear. PERRLA. Extra ocular movements intact. Neck: Supple with FROM. No thyroid-megaly, carotid bruits, or lymphadenopathy  Lungs: Breathing even and unlabored. All lobes clear to auscultation bilaterally   Heart :RRR, S1 and S2 normal intensity, no extra heart sounds  Extremities: +mild non-pitting edema to both feet   Musculoskeletal: +joint swelling to both hands   Neuro: Cranial nerves grossly normal.  Psych: Mood and thought content appropriate for situation. Dressed appropriately and with good hygiene. Skin: Warm, dry, and intact. No lesions or discoloration. Assessment/ Plan:     SOB  Most likely related to poorly controlled asthma but we are still waiting for one cardiac test to result.     Asthma, moderate persistent  She was reminded to use Advair twice a day EVERY day to prevent SOB  Cont Albuterol prn- script sent for spacer (she states she is not sure if she is using it correctly)  F/U 1 month    Hot flashes  Check TSH  She denies any s/s of infection  No new meds  Consider switching Effexor to Paxil   F/U 1 month        Verbal and written instructions (see AVS) provided. Patient expresses understanding of diagnosis and treatment plan. Health Maintenance Due   Topic Date Due    Hepatitis C Screening  Never done    COVID-19 Vaccine (1) Never done    Pneumococcal 0-64 years (1 - PCV) Never done    Colorectal Cancer Screening Combo  Never done    Shingles Vaccine (1 of 2) Never done    Breast Cancer Screen Mammogram  01/28/2022    DTaP/Tdap/Td series (2 - Td or Tdap) 07/24/2022               Ever Boyle, GEOFFREY

## 2023-03-02 LAB
A ALTERNATA IGE QN: <0.1 KU/L
A FUMIGATUS IGE QN: <0.1 KU/L
BERMUDA GRASS IGE QN: <0.1 KU/L
BOXELDER IGE QN: <0.1 KU/L
C HERBARUM IGE QN: <0.1 KU/L
CAT DANDER IGE QN: 3.4 KU/L
CMN PIGWEED IGE QN: <0.1 KU/L
COMMON RAGWEED IGE QN: <0.1 KU/L
COTTONWOOD IGE QN: <0.1 KU/L
D FARINAE IGE QN: <0.1 KU/L
D PTERONYSS IGE QN: <0.1 KU/L
DOG DANDER IGE QN: 5.53 KU/L
IGE SERPL-ACNC: 81 IU/ML (ref 6–495)
JOHNSON GRASS IGE QN: <0.1 KU/L
Lab: ABNORMAL
MOUSE URINE PROT IGE QN: 0.93 KU/L
MT JUNIPER IGE QN: <0.1 KU/L
P NOTATUM IGE QN: <0.1 KU/L
PECAN/HICK TREE IGE QN: <0.1 KU/L
ROACH IGE QN: <0.1 KU/L
SHEEP SORREL IGE QN: <0.1 KU/L
SILVER BIRCH IGE QN: <0.1 KU/L
TIMOTHY IGE QN: <0.1 KU/L
WHITE ELM IGE QN: <0.1 KU/L
WHITE MULBERRY IGE QN: <0.1 KU/L
WHITE OAK IGE QN: <0.1 KU/L

## 2023-03-03 LAB — TSH SERPL DL<=0.005 MIU/L-ACNC: 1.01 UIU/ML (ref 0.45–4.5)

## 2023-03-03 NOTE — PROGRESS NOTES
No. Most women have hot flashes when they first begin menopause, not several years later. I am doubtful that these are related to menopause. Medication side effects however can occur any time, no matter how long the patient has been on the medication. I am leaning towards the hot flashes are a side effect of effexor. That is my reasoning.

## 2023-03-03 NOTE — PROGRESS NOTES
Patient identified by Name and Date of birth. Results given as directed by provider. Are there any other benefits to switching other than hot flashes.

## 2023-03-03 NOTE — PROGRESS NOTES
Patient identified by Name and Date of birth. Results given as directed by provider.      Daughter wants to think on it for a while and talk to her mother

## 2023-03-03 NOTE — PROGRESS NOTES
TSH is normal. I would suggest switching the effexor to paxil which should help with the hot flashes. Let me know if she is agreeable.

## 2023-04-03 ENCOUNTER — TELEPHONE (OUTPATIENT)
Dept: CARDIOLOGY CLINIC | Age: 63
End: 2023-04-03

## 2023-04-04 NOTE — TELEPHONE ENCOUNTER
Please advise no arrhythmias on event monitor. Rare early heartbeats from the upper chambers of the heart. Continue same and follow-up as scheduled.

## 2023-04-05 ENCOUNTER — OFFICE VISIT (OUTPATIENT)
Dept: FAMILY MEDICINE CLINIC | Age: 63
End: 2023-04-05
Payer: MEDICAID

## 2023-04-05 PROCEDURE — 99214 OFFICE O/P EST MOD 30 MIN: CPT | Performed by: NURSE PRACTITIONER

## 2023-04-05 PROCEDURE — 3078F DIAST BP <80 MM HG: CPT | Performed by: NURSE PRACTITIONER

## 2023-04-05 PROCEDURE — 3074F SYST BP LT 130 MM HG: CPT | Performed by: NURSE PRACTITIONER

## 2023-04-05 NOTE — PROGRESS NOTES
1. \"Have you been to the ER, urgent care clinic since your last visit? Hospitalized since your last visit? \" No    2. \"Have you seen or consulted any other health care providers outside of the [address] [place] [address] since your last visit? \" No     3. For patients aged 39-70: Has the patient had a colonoscopy / FIT/ Cologuard? No  Will schedule    If the patient is female:    4. For patients aged 41-77: Has the patient had a mammogram within the past 2 years? No  Will schedule     5. For patients aged 21-65: Has the patient had a pap smear?  Yes - no Care Gap present

## 2023-04-05 NOTE — PROGRESS NOTES
Subjective:     CC: asthma, arthritis, HTN, HLD, obesity    Mami Gross is a 60 y.o. female who presents today to follow up for asthma and other chronic medical problems. She transferred from the [facility] last year. She is a poor historian. Asthma  States her SOB is better with more regular use of her Advair inhaler. Chronic pain  She has a hx of fibromyalgia and reports pain in the hands, shoulders, ankles, knees, and feet. Sees ortho. Rec's injections every so often. Planning on following up soon. HTN  She is on Metoprolol. BP today at goal. Denies any CP or dizziness. SOB has gotten better with Advair use. A-fib  She is followed by cardiology. On Metoprolol for rate control. Not anticoagulated. Recent event monitor showed only a few PACs. Obesity  Weight today 260#, BMI 42. She would like to lose weight. She is requesting a referral to a dietician and weight loss specialist.     HLD  On lipitor. Lab Results   Component Value Date/Time    Cholesterol, total 187 12/02/2022 04:00 PM    HDL Cholesterol 65 12/02/2022 04:00 PM    LDL, calculated 111 (H) 12/02/2022 04:00 PM    LDL, calculated 96 12/29/2021 04:00 PM    VLDL, calculated 11 12/02/2022 04:00 PM    VLDL, calculated 12 12/29/2021 04:00 PM    Triglyceride 57 12/02/2022 04:00 PM    CHOL/HDL Ratio 2.4 12/29/2021 04:00 PM       Migraine headaches  Followed by neuro Dr Saima Andrews. She is on monthly Emgality injections. RLS  States she takes gabapentin 300 mg daily qHS with good control. Neuropathy  She has chronic numbness and tingling in the hands and feet. Is not sure why. OAB  She is on Vesicare, states it helps, has to use Depends at times. BURTON  On CPAP. GERD  On Omeprazole with good control. Hx of gallbladder removal in 4-2022. OP  On Fosamax, calcium, and vit D supplement. Last DEXA- unknown. Repeat DEXA was ordered back in December but never completed.       Vit D deficiency  Lab Results Component Value Date/Time    Vitamin D 25-Hydroxy 19.9 (L) 12/29/2021 04:00 PM    VITAMIN D, 25-HYDROXY 46.8 12/02/2022 04:00 PM       She takes a Vit D supplement daily.        Health maintenance  PAP- done 11-17-22 (Normal)  Mammo- unsure of last mammo, repeat mammo was ordered back in December but never completed  Colonoscopy- due, she was referred to Dr Andi Callahan a few months ago but they were never able to get in touch with her   PNA vaccines- not addressed today  Shingrix- not addressed today  Covid vaccine- not addressed today      Patient Active Problem List   Diagnosis Code    Essential hypertension I10    Paroxysmal atrial fibrillation (HCC) I48.0    BURTON (obstructive sleep apnea) G47.33    Class 3 severe obesity due to excess calories without serious comorbidity in adult [facility]) E66.01    Fibromyalgia M79.7    Migraine G43.909    DJD (degenerative joint disease) M19.90    Cholelithiasis K80.20    Mild intermittent asthma without complication W19.59    Hyperlipidemia E78.5    Vitamin D deficiency E55.9    Moderate episode of recurrent major depressive disorder (HCC) F33.1    OAB (overactive bladder) N32.81    Peripheral polyneuropathy G62.9    RLS (restless legs syndrome) G25.81    Gastroesophageal reflux disease K21.9       Past Medical History:   Diagnosis Date    AF (paroxysmal atrial fibrillation) ([facility] [address] 04/2021    Asthma     DJD (degenerative joint disease)     Fibromyalgia     GERD (gastroesophageal reflux disease)     Hypertension     Ill-defined condition     cholestrol    Menopause     age 38    Migraine     Rheumatoid arthritis (HCC)     Sleep disorder     sleep apnea uses CPAP at night         Current Outpatient Medications:     inhalational spacing device, Use with Albuterol inhaler, Disp: 1 Each, Rfl: 0    venlafaxine-SR (EFFEXOR-XR) 75 mg capsule, Take 1 pill daily with a 37.5mg pill, Disp: 90 Capsule, Rfl: 1    venlafaxine-SR (EFFEXOR-XR) 37.5 mg capsule, Take 1 pill daily with a 75mg pill, Disp: 90 Capsule, Rfl: 1    Omeprazole delayed release (PRILOSEC D/R) 20 mg tablet, Take 1 Tablet by mouth daily. , Disp: 90 Tablet, Rfl: 3    cetirizine (ZYRTEC) 10 mg tablet, Take 1 Tablet by mouth daily. , Disp: 90 Tablet, Rfl: 3    solifenacin (VESICARE) 10 mg tablet, Take 1 Tablet by mouth daily. , Disp: 90 Tablet, Rfl: 1    aspirin 81 mg chewable tablet, Take 81 mg by mouth daily. , Disp: , Rfl:     atorvastatin (LIPITOR) 40 mg tablet, Take 1 Tablet by mouth daily. , Disp: 90 Tablet, Rfl: 0    fluticasone propion-salmeteroL (ADVAIR/WIXELA) 100-50 mcg/dose diskus inhaler, Take 1 Puff by inhalation two (2) times a day., Disp: 60 Each, Rfl: 0    galcanezumab-gnlm (Emgality Syringe) 120 mg/mL syrg, by SubCUTAneous route., Disp: , Rfl:     diclofenac EC (VOLTAREN) 75 mg EC tablet, Take  by mouth., Disp: , Rfl:     omega 3-dha-epa-fish oil 100-160-1,000 mg cap, Take  by mouth., Disp: , Rfl:     alendronate-vitamin d3 70 mg- 2,800 unit per tablet, Take 1 Tablet by mouth every seven (7) days. , Disp: , Rfl:     Calcium-Cholecalciferol, D3, (Calcium 600 with Vitamin D3) 600 mg-10 mcg (400 unit) chew, Take  by mouth., Disp: , Rfl:     metoprolol tartrate (LOPRESSOR) 50 mg tablet, Take 1 Tablet by mouth two (2) times a day., Disp: 180 Tablet, Rfl: 1    meloxicam (MOBIC) 7.5 mg tablet, Take 7.5 mg by mouth daily. , Disp: , Rfl:     gabapentin (NEURONTIN) 300 mg capsule, Take 300 mg by mouth nightly., Disp: , Rfl:     topiramate (TOPAMAX) 50 mg tablet, Take 50 mg by mouth nightly., Disp: , Rfl:     albuterol (PROVENTIL HFA, VENTOLIN HFA, PROAIR HFA) 90 mcg/actuation inhaler, Take 2 Puffs by inhalation every four (4) hours as needed for Wheezing., Disp: , Rfl:     beclomethasone (BECONASE AQ) 42 mcg (0.042 %) nasal spray, 2 Puffs by Both Nostrils route two (2) times a day.  Indications: inflammation of the nose due to an allergy, Disp: , Rfl:     Allergies   Allergen Reactions    Ace Inhibitors Angioedema    Aspirin Other (comments) Other Medication Rash     Rash reaction to wipes used for cleansing prior to eye surgery. Penicillins Hives    Sulfa (Sulfonamide Antibiotics) Unable to Obtain    Sulfur Hives     Reaction Type: Allergy       Past Surgical History:   Procedure Laterality Date    HX CATARACT REMOVAL Right 09/05/2020    HX HEENT  2021    strabismus correction       Social History     Tobacco Use   Smoking Status Never   Smokeless Tobacco Never   Tobacco Comments    social smoker       Social History     Socioeconomic History    Marital status:    Tobacco Use    Smoking status: Never    Smokeless tobacco: Never    Tobacco comments:     social smoker   Vaping Use    Vaping Use: Never used   Substance and Sexual Activity    Alcohol use:  Yes     Alcohol/week: 2.0 standard drinks     Types: 2 Glasses of wine per week     Comment: socially    Drug use: No    Sexual activity: Not Currently     Partners: Male     Social Determinants of Health     Financial Resource Strain: Low Risk     Difficulty of Paying Living Expenses: Not very hard   Food Insecurity: No Food Insecurity    Worried About Running Out of Food in the Last Year: Never true    Ran Out of Food in the Last Year: Never true       Family History   Problem Relation Age of Onset    Diabetes Mother     Hypertension Sister        ROS:  Gen: denies fever, chills, or fatigue  HEENT:denies H/A, nasal congestion, ear pain, or sore throat  Resp: + occas dyspnea- improved, denies cough or wheezing  CV: denies chest pain, pressure, or palpitations  Extremeties: denies swelling  GI[de-identified] denies abdominal pain, nausea, vomiting, diarrhea, or constipation  : denies dysuria or hematuria, +chronic urinary frequency with occas incontinence  Musculoskeletal: +chronic generalized joint pain, stiffness, and swelling  Neuro: + chronic numbness/tingling in hands and feet, denies dizziness, extremity weakness, tremor, or seizure activity  Skin: denies rashes or new lesions   Psych: denies anxiety, +depression- stable    Objective:     Visit Vitals  /65 (BP 1 Location: Left upper arm)   Pulse 96   Temp 97.9 °F (36.6 °C) (Temporal)   Resp 18   Ht 5' 6\" (1.676 m)   Wt 260 lb 6 oz (118.1 kg)   SpO2 96%   BMI 42.03 kg/m²       General: Alert and oriented. No acute distress. +Obese  HEENT :  Eyes: Sclera white, conjunctiva clear. PERRLA. Extra ocular movements intact. Neck: Supple with FROM. No thyroid-megaly, carotid bruits, or lymphadenopathy  Lungs: Breathing even and unlabored. All lobes clear to auscultation bilaterally   Heart :RRR, S1 and S2 normal intensity, no extra heart sounds  Extremities: +mild non-pitting edema to both feet   Musculoskeletal: +joint swelling to both hands   Neuro: Cranial nerves grossly normal.  Psych: Mood and thought content appropriate for situation. Dressed appropriately and with good hygiene. Skin: Warm, dry, and intact. No lesions or discoloration. Assessment/ Plan:     Asthma  Well controlled  Cont Advair BID  Cont Albuterol prn  Avoid triggers  F/U 6 months or sooner prn if symptoms worsen. HTN  BP at goal today, however daughter reports her DBP has been elevated in the 90's. Increase Metoprolol to 50mg  Cont HCTZ  Limit sodium in the diet  F/U 1 month    A-fib  Stable  Cont Metoprolol for rate control  She is not on any blood thinners  Go to ER for any CP, SOB, dizziness, or syncope  Referred to cardiologist for further eval.    Obesity  Weight 260#, BMI 42  Referred to dietician and weight loss clinic today     Chronic pain  F/U with ortho    HLD  Cont lipitor  Low fat diet    Migraine headaches  Per neuro     Major depression  Stable  Cont Effexor   F/U 6 months or sooner if symptoms worsen    OAB  Stable  Cont  Vesicare  Limit caffeine    BURTON  Cont CPAP.     GERD  Stable  Cont Omeprazole  Avoid triggers    OP  Cont Fosamax, calcium, and vit D supplement    Vit D deficiency  Cont Vit D supplement daily        Verbal and written instructions (see AVS) provided. Patient expresses understanding of diagnosis and treatment plan. Health Maintenance Due   Topic Date Due    Hepatitis C Screening  Never done    COVID-19 Vaccine (1) Never done    Pneumococcal 0-64 years (1 - PCV) Never done    Colorectal Cancer Screening Combo  Never done    Shingles Vaccine (1 of 2) Never done    Breast Cancer Screen Mammogram  01/28/2022    DTaP/Tdap/Td series (2 - Td or Tdap) 07/24/2022               Oliver Vazquez NP

## 2023-04-07 ENCOUNTER — DOCUMENTATION ONLY (OUTPATIENT)
Dept: FAMILY MEDICINE CLINIC | Age: 63
End: 2023-04-07

## 2023-04-26 ENCOUNTER — DOCUMENTATION ONLY (OUTPATIENT)
Dept: FAMILY MEDICINE CLINIC | Age: 63
End: 2023-04-26

## 2023-05-02 ENCOUNTER — HOSPITAL ENCOUNTER (OUTPATIENT)
Dept: NUTRITION | Age: 63
Discharge: HOME | End: 2023-05-02

## 2023-05-09 ENCOUNTER — CLINICAL DOCUMENTATION (OUTPATIENT)
Age: 63
End: 2023-05-09

## 2023-05-22 ENCOUNTER — HOSPITAL ENCOUNTER (OUTPATIENT)
Facility: HOSPITAL | Age: 63
Discharge: HOME | End: 2023-05-25
Payer: MEDICAID

## 2023-05-22 DIAGNOSIS — M25.561 RIGHT KNEE PAIN, UNSPECIFIED CHRONICITY: ICD-10-CM

## 2023-05-22 DIAGNOSIS — M25.562 LEFT KNEE PAIN, UNSPECIFIED CHRONICITY: ICD-10-CM

## 2023-05-22 PROCEDURE — 73562 X-RAY EXAM OF KNEE 3: CPT

## 2023-05-22 PROCEDURE — 97803 MED NUTRITION INDIV SUBSEQ: CPT

## 2023-05-25 ENCOUNTER — NURSE TRIAGE (OUTPATIENT)
Dept: OTHER | Facility: CLINIC | Age: 63
End: 2023-05-25

## 2023-05-25 NOTE — TELEPHONE ENCOUNTER
[facility]    Received call from [address] at [facility] with [employer] Complaint. Subjective: Caller states \"decrease mobility; off balance\"     Current Symptoms:   Calling to get referral for PT   Shots in knees from ortho   Off balance - transferring problems    Onset:   Worse than normal - for a while - a couple months     Pain Severity:   Now has back pain     Temperature:    None     What has been tried:   None     Recommended disposition:   2 weeks - warm transfer to [address] advice provided, patient verbalizes understanding; denies any other questions or concerns; instructed to call back for any new or worsening symptoms. Attention Provider: Thank you for allowing me to participate in the care of your patient. The patient was connected to triage in response to information provided to the ECC/PSC. Please do not respond through this encounter as the response is not directed to a shared pool.     Reason for Disposition   Weakness is a chronic symptom (recurrent or ongoing AND lasting > 4 weeks)    Protocols used: Weakness (Generalized) and Fatigue-ADULT-[place]

## 2023-05-25 NOTE — DISCHARGE INSTR - DIET
Good nutrition is important when healing from an illness, injury, or surgery. Follow any nutrition recommendations given to you during your hospital stay. If you were given an oral nutrition supplement while in the hospital, continue to take this supplement at home. You can take it with meals, in-between meals, and/or before bedtime. These supplements can be purchased at most local grocery stores, pharmacies, and chain [employer]-stores. If you have any questions about your diet or nutrition, call the hospital and ask for the dietitian. Gabapentin Counseling: I discussed with the patient the risks of gabapentin including but not limited to dizziness, somnolence, fatigue and ataxia.

## 2023-06-06 ENCOUNTER — TELEPHONE (OUTPATIENT)
Age: 63
End: 2023-06-06

## 2023-06-06 ENCOUNTER — OFFICE VISIT (OUTPATIENT)
Age: 63
End: 2023-06-06
Payer: MEDICAID

## 2023-06-06 VITALS
HEART RATE: 55 BPM | WEIGHT: 253.2 LBS | DIASTOLIC BLOOD PRESSURE: 75 MMHG | SYSTOLIC BLOOD PRESSURE: 128 MMHG | TEMPERATURE: 98.1 F | HEIGHT: 66 IN | RESPIRATION RATE: 16 BRPM | BODY MASS INDEX: 40.69 KG/M2 | OXYGEN SATURATION: 97 %

## 2023-06-06 DIAGNOSIS — E55.9 VITAMIN D DEFICIENCY, UNSPECIFIED: ICD-10-CM

## 2023-06-06 DIAGNOSIS — Z23 NEED FOR VACCINATION: ICD-10-CM

## 2023-06-06 DIAGNOSIS — M17.0 PRIMARY OSTEOARTHRITIS OF BOTH KNEES: Primary | ICD-10-CM

## 2023-06-06 DIAGNOSIS — G89.29 CHRONIC LOW BACK PAIN WITHOUT SCIATICA, UNSPECIFIED BACK PAIN LATERALITY: ICD-10-CM

## 2023-06-06 DIAGNOSIS — M81.0 AGE-RELATED OSTEOPOROSIS WITHOUT CURRENT PATHOLOGICAL FRACTURE: ICD-10-CM

## 2023-06-06 DIAGNOSIS — M54.50 CHRONIC LOW BACK PAIN WITHOUT SCIATICA, UNSPECIFIED BACK PAIN LATERALITY: ICD-10-CM

## 2023-06-06 PROBLEM — Z87.898 HISTORY OF PREDIABETES: Status: ACTIVE | Noted: 2023-06-06

## 2023-06-06 PROCEDURE — 90677 PCV20 VACCINE IM: CPT | Performed by: NURSE PRACTITIONER

## 2023-06-06 PROCEDURE — 90471 IMMUNIZATION ADMIN: CPT | Performed by: NURSE PRACTITIONER

## 2023-06-06 PROCEDURE — 3078F DIAST BP <80 MM HG: CPT | Performed by: NURSE PRACTITIONER

## 2023-06-06 PROCEDURE — 3074F SYST BP LT 130 MM HG: CPT | Performed by: NURSE PRACTITIONER

## 2023-06-06 PROCEDURE — 99214 OFFICE O/P EST MOD 30 MIN: CPT | Performed by: NURSE PRACTITIONER

## 2023-06-06 SDOH — ECONOMIC STABILITY: FOOD INSECURITY: WITHIN THE PAST 12 MONTHS, THE FOOD YOU BOUGHT JUST DIDN'T LAST AND YOU DIDN'T HAVE MONEY TO GET MORE.: NEVER TRUE

## 2023-06-06 SDOH — ECONOMIC STABILITY: FOOD INSECURITY: WITHIN THE PAST 12 MONTHS, YOU WORRIED THAT YOUR FOOD WOULD RUN OUT BEFORE YOU GOT MONEY TO BUY MORE.: NEVER TRUE

## 2023-06-06 SDOH — ECONOMIC STABILITY: INCOME INSECURITY: HOW HARD IS IT FOR YOU TO PAY FOR THE VERY BASICS LIKE FOOD, HOUSING, MEDICAL CARE, AND HEATING?: NOT HARD AT ALL

## 2023-06-06 SDOH — ECONOMIC STABILITY: HOUSING INSECURITY
IN THE LAST 12 MONTHS, WAS THERE A TIME WHEN YOU DID NOT HAVE A STEADY PLACE TO SLEEP OR SLEPT IN A SHELTER (INCLUDING NOW)?: NO

## 2023-06-06 NOTE — PROGRESS NOTES
1. \"Have you been to the ER, urgent care clinic since your last visit? Hospitalized since your last visit? \" No    2. \"Have you seen or consulted any other health care providers outside of the [address] [place] [address] since your last visit? \" No     3. For patients aged 39-70: Has the patient had a colonoscopy / FIT/ Cologuard? No     If the patient is female:    4. For patients aged 41-77: Has the patient had a mammogram within the past 2 years? No    5. For patients aged 21-65: Has the patient had a pap smear?  No
After obtaining consent, and per orders of NP, injection of Prenar 20 given in Left deltoid by Fallon Mejia LPN. Patient instructed to remain in clinic for 20 minutes afterwards, and to report any adverse reaction to me immediately.
Alert and oriented. No acute distress. +Obese  HEENT :  Eyes: Sclera white, conjunctiva clear. PERRLA. Extra ocular movements intact. Neck: Supple with FROM  Lungs: Breathing even and unlabored. All lobes clear to auscultation bilaterally   Heart :RRR, S1 and S2 normal intensity, no extra heart sounds  Extremities: +mild non-pitting edema to both feet   Musculoskeletal: +joint swelling to both knees  Neuro: Cranial nerves grossly normal.  Psych: Mood and thought content appropriate for situation. Dressed appropriately and with good hygiene. Skin: Warm, dry, and intact. No lesions or discoloration. Assessment/ Plan:      OA of both knees  Referred to PT  F/U prn if symptoms worsen or do not improve. Chronic low back pain without sciatica  Referred to PT  F/U prn if symptoms worsen or do not improve. OP with Vit D deficiency  Due for repeat DEXA- she needs to schedule  Check CMP and Vit D level  Cont Fosamax and vit D supplement- refilled      Health maintenance  Prevnar 20 given today                     Verbal and written instructions (see AVS) provided. Patient expresses understanding of diagnosis and treatment plan. Health Maintenance Due   Topic Date Due    COVID-19 Vaccine (1) Never done    Pneumococcal 0-64 years Vaccine (1 - PCV) Never done    Hepatitis C screen  Never done    Colorectal Cancer Screen  Never done    Shingles vaccine (1 of 2) Never done    Breast cancer screen  01/28/2022    DTaP/Tdap/Td vaccine (2 - Td or Tdap) 07/24/2022    A1C test (Diabetic or Prediabetic)  12/29/2022         No follow-up provider specified. March Samuel.  Luzma Morel NP

## 2023-06-07 ENCOUNTER — CLINICAL DOCUMENTATION (OUTPATIENT)
Age: 63
End: 2023-06-07

## 2023-06-07 RX ORDER — ALENDRONATE SODIUM 70 MG/1
70 TABLET ORAL
Qty: 12 TABLET | Refills: 1 | Status: SHIPPED | OUTPATIENT
Start: 2023-06-07

## 2023-06-08 LAB
25(OH)D3+25(OH)D2 SERPL-MCNC: 71 NG/ML (ref 30–100)
ALBUMIN SERPL-MCNC: 4.1 G/DL (ref 3.8–4.8)
ALBUMIN/GLOB SERPL: 1.8 {RATIO} (ref 1.2–2.2)
ALP SERPL-CCNC: 94 IU/L (ref 44–121)
ALT SERPL-CCNC: 17 IU/L (ref 0–32)
AST SERPL-CCNC: 16 IU/L (ref 0–40)
BILIRUB SERPL-MCNC: 0.4 MG/DL (ref 0–1.2)
BUN SERPL-MCNC: 11 MG/DL (ref 8–27)
BUN/CREAT SERPL: 14 (ref 12–28)
CALCIUM SERPL-MCNC: 9.7 MG/DL (ref 8.7–10.3)
CHLORIDE SERPL-SCNC: 105 MMOL/L (ref 96–106)
CO2 SERPL-SCNC: 24 MMOL/L (ref 20–29)
CREAT SERPL-MCNC: 0.81 MG/DL (ref 0.57–1)
EGFRCR SERPLBLD CKD-EPI 2021: 82 ML/MIN/1.73
GLOBULIN SER CALC-MCNC: 2.3 G/DL (ref 1.5–4.5)
GLUCOSE SERPL-MCNC: 101 MG/DL (ref 70–99)
POTASSIUM SERPL-SCNC: 4 MMOL/L (ref 3.5–5.2)
PROT SERPL-MCNC: 6.4 G/DL (ref 6–8.5)
SODIUM SERPL-SCNC: 143 MMOL/L (ref 134–144)

## 2023-06-26 ENCOUNTER — HOSPITAL ENCOUNTER (OUTPATIENT)
Facility: HOSPITAL | Age: 63
Discharge: HOME | End: 2023-06-29
Payer: MEDICAID

## 2023-06-26 PROCEDURE — 97803 MED NUTRITION INDIV SUBSEQ: CPT

## 2023-06-29 ENCOUNTER — OFFICE VISIT (OUTPATIENT)
Age: 63
End: 2023-06-29

## 2023-06-29 VITALS
WEIGHT: 245 LBS | TEMPERATURE: 97.9 F | OXYGEN SATURATION: 98 % | RESPIRATION RATE: 26 BRPM | HEART RATE: 65 BPM | SYSTOLIC BLOOD PRESSURE: 118 MMHG | HEIGHT: 66 IN | DIASTOLIC BLOOD PRESSURE: 86 MMHG | BODY MASS INDEX: 39.37 KG/M2

## 2023-06-29 DIAGNOSIS — E78.2 MIXED HYPERLIPIDEMIA: ICD-10-CM

## 2023-06-29 DIAGNOSIS — R55 SYNCOPE, UNSPECIFIED SYNCOPE TYPE: Primary | ICD-10-CM

## 2023-06-29 DIAGNOSIS — I48.0 PAROXYSMAL ATRIAL FIBRILLATION (HCC): ICD-10-CM

## 2023-06-29 DIAGNOSIS — R00.2 INTERMITTENT PALPITATIONS: ICD-10-CM

## 2023-06-29 DIAGNOSIS — I10 ESSENTIAL (PRIMARY) HYPERTENSION: ICD-10-CM

## 2023-06-29 DIAGNOSIS — G47.33 OBSTRUCTIVE SLEEP APNEA (ADULT) (PEDIATRIC): ICD-10-CM

## 2023-06-29 DIAGNOSIS — R00.2 PALPITATIONS: ICD-10-CM

## 2023-06-29 ASSESSMENT — PATIENT HEALTH QUESTIONNAIRE - PHQ9
SUM OF ALL RESPONSES TO PHQ9 QUESTIONS 1 & 2: 2
SUM OF ALL RESPONSES TO PHQ QUESTIONS 1-9: 2
1. LITTLE INTEREST OR PLEASURE IN DOING THINGS: 1
SUM OF ALL RESPONSES TO PHQ QUESTIONS 1-9: 2
2. FEELING DOWN, DEPRESSED OR HOPELESS: 1

## 2023-07-03 ENCOUNTER — TELEPHONE (OUTPATIENT)
Age: 63
End: 2023-07-03

## 2023-07-03 ENCOUNTER — HOSPITAL ENCOUNTER (OUTPATIENT)
Facility: HOSPITAL | Age: 63
Discharge: HOME | End: 2023-07-06
Payer: MEDICAID

## 2023-07-03 ENCOUNTER — OFFICE VISIT (OUTPATIENT)
Age: 63
End: 2023-07-03
Payer: MEDICAID

## 2023-07-03 VITALS
TEMPERATURE: 98.7 F | HEIGHT: 66 IN | WEIGHT: 246.13 LBS | SYSTOLIC BLOOD PRESSURE: 111 MMHG | HEART RATE: 72 BPM | BODY MASS INDEX: 39.55 KG/M2 | RESPIRATION RATE: 18 BRPM | OXYGEN SATURATION: 97 % | DIASTOLIC BLOOD PRESSURE: 73 MMHG

## 2023-07-03 DIAGNOSIS — R55 SYNCOPE, UNSPECIFIED SYNCOPE TYPE: ICD-10-CM

## 2023-07-03 DIAGNOSIS — G89.29 CHRONIC LEFT SHOULDER PAIN: Primary | ICD-10-CM

## 2023-07-03 DIAGNOSIS — M25.512 CHRONIC LEFT SHOULDER PAIN: ICD-10-CM

## 2023-07-03 DIAGNOSIS — G89.29 CHRONIC LEFT SHOULDER PAIN: ICD-10-CM

## 2023-07-03 DIAGNOSIS — M25.512 CHRONIC LEFT SHOULDER PAIN: Primary | ICD-10-CM

## 2023-07-03 PROCEDURE — 99214 OFFICE O/P EST MOD 30 MIN: CPT | Performed by: NURSE PRACTITIONER

## 2023-07-03 PROCEDURE — 3078F DIAST BP <80 MM HG: CPT | Performed by: NURSE PRACTITIONER

## 2023-07-03 PROCEDURE — 72050 X-RAY EXAM NECK SPINE 4/5VWS: CPT

## 2023-07-03 PROCEDURE — 73030 X-RAY EXAM OF SHOULDER: CPT

## 2023-07-03 PROCEDURE — 3074F SYST BP LT 130 MM HG: CPT | Performed by: NURSE PRACTITIONER

## 2023-07-03 RX ORDER — PREDNISONE 10 MG/1
TABLET ORAL
Qty: 21 TABLET | Refills: 0 | Status: SHIPPED | OUTPATIENT
Start: 2023-07-03

## 2023-07-03 RX ORDER — SOLIFENACIN SUCCINATE 10 MG/1
10 TABLET, FILM COATED ORAL DAILY
Qty: 90 TABLET | Refills: 1 | Status: SHIPPED | OUTPATIENT
Start: 2023-07-03

## 2023-07-03 RX ORDER — ATORVASTATIN CALCIUM 40 MG/1
40 TABLET, FILM COATED ORAL DAILY
Qty: 90 TABLET | Refills: 1 | Status: SHIPPED | OUTPATIENT
Start: 2023-07-03

## 2023-07-03 RX ORDER — DICLOFENAC SODIUM 75 MG/1
75 TABLET, DELAYED RELEASE ORAL 2 TIMES DAILY
Qty: 180 TABLET | Refills: 1 | Status: SHIPPED | OUTPATIENT
Start: 2023-07-03

## 2023-07-03 SDOH — ECONOMIC STABILITY: FOOD INSECURITY: WITHIN THE PAST 12 MONTHS, YOU WORRIED THAT YOUR FOOD WOULD RUN OUT BEFORE YOU GOT MONEY TO BUY MORE.: NEVER TRUE

## 2023-07-03 SDOH — ECONOMIC STABILITY: INCOME INSECURITY: HOW HARD IS IT FOR YOU TO PAY FOR THE VERY BASICS LIKE FOOD, HOUSING, MEDICAL CARE, AND HEATING?: NOT VERY HARD

## 2023-07-03 SDOH — ECONOMIC STABILITY: FOOD INSECURITY: WITHIN THE PAST 12 MONTHS, THE FOOD YOU BOUGHT JUST DIDN'T LAST AND YOU DIDN'T HAVE MONEY TO GET MORE.: NEVER TRUE

## 2023-07-03 ASSESSMENT — PATIENT HEALTH QUESTIONNAIRE - PHQ9
SUM OF ALL RESPONSES TO PHQ9 QUESTIONS 1 & 2: 1
SUM OF ALL RESPONSES TO PHQ QUESTIONS 1-9: 1
SUM OF ALL RESPONSES TO PHQ QUESTIONS 1-9: 1
1. LITTLE INTEREST OR PLEASURE IN DOING THINGS: 0
SUM OF ALL RESPONSES TO PHQ QUESTIONS 1-9: 1
2. FEELING DOWN, DEPRESSED OR HOPELESS: 1
SUM OF ALL RESPONSES TO PHQ QUESTIONS 1-9: 1

## 2023-07-03 NOTE — PROGRESS NOTES
Subjective:     CC: arm pain    Светлана Green is a 60 y.o. female who presents today with c/o chronic left arm pain. Pain starts in the shoulder and radiates all the way down to the fingers. Fingers go numb (all fingers). Denies any extremity weakness. Pain aggravated when she tries to get dressed or reaches behind her. Has been taking tylenol. She also has some pain in her neck. Rcc'd the Prevnar 20 vaccine on 6-6-23 in the left deltoid. She believes the pain started after that. She went to the ER on 6-11-23 after a syncopal episode. Patient reports \"I just can't handle the heat. My heart will start racing and I get dizzy. \"      She saw cardiology several months ago and was worked up for palpitations. There was mention of possible A-fib. She was advised to schedule a follow up appt and saw NP Vijaya Garza on 6-29-23. They discussed the option of an implantable loop monitor but patient is not sure about this. She may just repeat the 1 month event monitor. She is currently seeing a neurologist at [facility] for migraines. She was advised to see him for seizure work up but has not done this yet.        Patient Active Problem List   Diagnosis    Cholelithiasis    Paroxysmal atrial fibrillation (HCC)    JOSEFINA (obstructive sleep apnea)    Class 3 severe obesity due to excess calories without serious comorbidity in adult [facility])    Migraine    DJD (degenerative joint disease)    Fibromyalgia    Essential hypertension    Mild intermittent asthma without complication    Hyperlipidemia    Vitamin D deficiency    Moderate episode of recurrent major depressive disorder (HCC)    OAB (overactive bladder)    Peripheral polyneuropathy    RLS (restless legs syndrome)    Gastroesophageal reflux disease    History of prediabetes       Past Medical History:   Diagnosis Date    AF (paroxysmal atrial fibrillation) ([facility]) 04/2021    Asthma     DJD (degenerative joint disease)     Fibromyalgia     GERD (gastroesophageal reflux disease)

## 2023-07-03 NOTE — TELEPHONE ENCOUNTER
Patient LVM stating that she wants to talk to NP Pauline Meter referring to Loop Monitor, stated that she had questions and wants to rethink about procedure.

## 2023-07-03 NOTE — PROGRESS NOTES
1. \"Have you been to the ER, urgent care clinic since your last visit? Hospitalized since your last visit? \" No    2. \"Have you seen or consulted any other health care providers outside of the [address] [place] [address] since your last visit? \" Yes When: cardiology 6-29-23      3. For patients aged 43-73: Has the patient had a colonoscopy / FIT/ Cologuard? No not at this time    If the patient is female:    4. For patients aged 43-66: Has the patient had a mammogram within the past 2 years? No not at this time     5. For patients aged 21-65: Has the patient had a pap smear? NA - based on age    Chief Complaint   Patient presents with    Arm Pain     left       There were no vitals filed for this visit.

## 2023-07-05 PROCEDURE — 99285 EMERGENCY DEPT VISIT HI MDM: CPT

## 2023-07-05 PROCEDURE — 96365 THER/PROPH/DIAG IV INF INIT: CPT

## 2023-07-06 ENCOUNTER — APPOINTMENT (OUTPATIENT)
Facility: HOSPITAL | Age: 63
End: 2023-07-06
Payer: MEDICAID

## 2023-07-06 ENCOUNTER — HOSPITAL ENCOUNTER (EMERGENCY)
Facility: HOSPITAL | Age: 63
Discharge: HOME | End: 2023-07-06
Attending: FAMILY MEDICINE
Payer: MEDICAID

## 2023-07-06 VITALS
SYSTOLIC BLOOD PRESSURE: 122 MMHG | TEMPERATURE: 98.1 F | OXYGEN SATURATION: 99 % | HEIGHT: 66 IN | WEIGHT: 244 LBS | DIASTOLIC BLOOD PRESSURE: 67 MMHG | RESPIRATION RATE: 18 BRPM | HEART RATE: 78 BPM | BODY MASS INDEX: 39.21 KG/M2

## 2023-07-06 DIAGNOSIS — N10 ACUTE PYELONEPHRITIS: Primary | ICD-10-CM

## 2023-07-06 LAB
ALBUMIN SERPL-MCNC: 3.7 G/DL (ref 3.5–5)
ALBUMIN/GLOB SERPL: 1.1 (ref 1.1–2.2)
ALP SERPL-CCNC: 117 U/L (ref 45–117)
ALT SERPL-CCNC: 76 U/L (ref 12–78)
ANION GAP SERPL CALC-SCNC: 12 MMOL/L (ref 5–15)
APPEARANCE UR: ABNORMAL
AST SERPL-CCNC: 100 U/L (ref 15–37)
BACTERIA URNS QL MICRO: ABNORMAL /HPF
BASOPHILS # BLD: 0 K/UL (ref 0–0.1)
BASOPHILS NFR BLD: 0 % (ref 0–1)
BILIRUB SERPL-MCNC: 0.5 MG/DL (ref 0.2–1)
BILIRUB UR QL: NEGATIVE
BUN SERPL-MCNC: 16 MG/DL (ref 6–20)
BUN/CREAT SERPL: 19 (ref 12–20)
CALCIUM SERPL-MCNC: 9.2 MG/DL (ref 8.5–10.1)
CHLORIDE SERPL-SCNC: 104 MMOL/L (ref 97–108)
CO2 SERPL-SCNC: 27 MMOL/L (ref 21–32)
COLOR UR: ABNORMAL
CREAT SERPL-MCNC: 0.86 MG/DL (ref 0.55–1.02)
DIFFERENTIAL METHOD BLD: ABNORMAL
EOSINOPHIL # BLD: 0.1 K/UL (ref 0–0.4)
EOSINOPHIL NFR BLD: 1 % (ref 0–7)
EPITH CASTS URNS QL MICRO: ABNORMAL /LPF
ERYTHROCYTE [DISTWIDTH] IN BLOOD BY AUTOMATED COUNT: 14.6 % (ref 11.5–14.5)
FLUAV RNA SPEC QL NAA+PROBE: NOT DETECTED
FLUBV RNA SPEC QL NAA+PROBE: NOT DETECTED
GLOBULIN SER CALC-MCNC: 3.5 G/DL (ref 2–4)
GLUCOSE SERPL-MCNC: 121 MG/DL (ref 65–100)
GLUCOSE UR STRIP.AUTO-MCNC: NEGATIVE MG/DL
HCT VFR BLD AUTO: 41.4 % (ref 35–47)
HGB BLD-MCNC: 13.2 G/DL (ref 11.5–16)
HGB UR QL STRIP: ABNORMAL
IMM GRANULOCYTES # BLD AUTO: 0 K/UL (ref 0–0.04)
IMM GRANULOCYTES NFR BLD AUTO: 0 % (ref 0–0.5)
KETONES UR QL STRIP.AUTO: NEGATIVE MG/DL
LACTATE SERPL-SCNC: 0.9 MMOL/L (ref 0.4–2)
LEUKOCYTE ESTERASE UR QL STRIP.AUTO: ABNORMAL
LYMPHOCYTES # BLD: 0.9 K/UL (ref 0.8–3.5)
LYMPHOCYTES NFR BLD: 7 % (ref 12–49)
MCH RBC QN AUTO: 28.6 PG (ref 26–34)
MCHC RBC AUTO-ENTMCNC: 31.9 G/DL (ref 30–36.5)
MCV RBC AUTO: 89.6 FL (ref 80–99)
MONOCYTES # BLD: 0.5 K/UL (ref 0–1)
MONOCYTES NFR BLD: 4 % (ref 5–13)
NEUTS SEG # BLD: 11.9 K/UL (ref 1.8–8)
NEUTS SEG NFR BLD: 88 % (ref 32–75)
NITRITE UR QL STRIP.AUTO: NEGATIVE
NRBC # BLD: 0 K/UL (ref 0–0.01)
NRBC BLD-RTO: 0 PER 100 WBC
PH UR STRIP: 6.5 (ref 5–8)
PLATELET # BLD AUTO: 237 K/UL (ref 150–400)
PLATELET COMMENT: ABNORMAL
PMV BLD AUTO: 8.9 FL (ref 8.9–12.9)
POTASSIUM SERPL-SCNC: 3.6 MMOL/L (ref 3.5–5.1)
PROT SERPL-MCNC: 7.2 G/DL (ref 6.4–8.2)
PROT UR STRIP-MCNC: NEGATIVE MG/DL
RBC # BLD AUTO: 4.62 M/UL (ref 3.8–5.2)
RBC #/AREA URNS HPF: ABNORMAL /HPF (ref 0–5)
RBC MORPH BLD: ABNORMAL
SARS-COV-2 RNA RESP QL NAA+PROBE: NOT DETECTED
SODIUM SERPL-SCNC: 143 MMOL/L (ref 136–145)
SP GR UR REFRACTOMETRY: 1.01 (ref 1–1.03)
TROPONIN I SERPL HS-MCNC: 6 NG/L (ref 0–51)
URINE CULTURE IF INDICATED: ABNORMAL
UROBILINOGEN UR QL STRIP.AUTO: 2 EU/DL (ref 0.2–1)
WBC # BLD AUTO: 13.4 K/UL (ref 3.6–11)
WBC URNS QL MICRO: ABNORMAL /HPF (ref 0–4)

## 2023-07-06 PROCEDURE — 80053 COMPREHEN METABOLIC PANEL: CPT

## 2023-07-06 PROCEDURE — 87040 BLOOD CULTURE FOR BACTERIA: CPT

## 2023-07-06 PROCEDURE — 87636 SARSCOV2 & INF A&B AMP PRB: CPT

## 2023-07-06 PROCEDURE — 71045 X-RAY EXAM CHEST 1 VIEW: CPT

## 2023-07-06 PROCEDURE — 93005 ELECTROCARDIOGRAM TRACING: CPT | Performed by: FAMILY MEDICINE

## 2023-07-06 PROCEDURE — 74177 CT ABD & PELVIS W/CONTRAST: CPT

## 2023-07-06 PROCEDURE — 87086 URINE CULTURE/COLONY COUNT: CPT

## 2023-07-06 PROCEDURE — 6360000002 HC RX W HCPCS: Performed by: FAMILY MEDICINE

## 2023-07-06 PROCEDURE — 36415 COLL VENOUS BLD VENIPUNCTURE: CPT

## 2023-07-06 PROCEDURE — 84484 ASSAY OF TROPONIN QUANT: CPT

## 2023-07-06 PROCEDURE — 87150 DNA/RNA AMPLIFIED PROBE: CPT

## 2023-07-06 PROCEDURE — 87077 CULTURE AEROBIC IDENTIFY: CPT

## 2023-07-06 PROCEDURE — 81001 URINALYSIS AUTO W/SCOPE: CPT

## 2023-07-06 PROCEDURE — 85025 COMPLETE CBC W/AUTO DIFF WBC: CPT

## 2023-07-06 PROCEDURE — 96365 THER/PROPH/DIAG IV INF INIT: CPT

## 2023-07-06 PROCEDURE — 87186 SC STD MICRODIL/AGAR DIL: CPT

## 2023-07-06 PROCEDURE — 6370000000 HC RX 637 (ALT 250 FOR IP): Performed by: FAMILY MEDICINE

## 2023-07-06 PROCEDURE — 83605 ASSAY OF LACTIC ACID: CPT

## 2023-07-06 PROCEDURE — 6360000004 HC RX CONTRAST MEDICATION: Performed by: FAMILY MEDICINE

## 2023-07-06 RX ORDER — LEVOFLOXACIN 5 MG/ML
750 INJECTION, SOLUTION INTRAVENOUS
Status: COMPLETED | OUTPATIENT
Start: 2023-07-06 | End: 2023-07-06

## 2023-07-06 RX ORDER — LEVOFLOXACIN 750 MG/1
750 TABLET ORAL DAILY
Qty: 7 TABLET | Refills: 0 | Status: SHIPPED | OUTPATIENT
Start: 2023-07-06 | End: 2023-07-13

## 2023-07-06 RX ORDER — ACETAMINOPHEN 500 MG
1000 TABLET ORAL
Status: COMPLETED | OUTPATIENT
Start: 2023-07-06 | End: 2023-07-06

## 2023-07-06 RX ADMIN — IOPAMIDOL 100 ML: 612 INJECTION, SOLUTION INTRAVENOUS at 06:23

## 2023-07-06 RX ADMIN — ACETAMINOPHEN 1000 MG: 500 TABLET ORAL at 07:13

## 2023-07-06 RX ADMIN — LEVOFLOXACIN 750 MG: 5 INJECTION, SOLUTION INTRAVENOUS at 07:14

## 2023-07-06 ASSESSMENT — PAIN SCALES - GENERAL
PAINLEVEL_OUTOF10: 10
PAINLEVEL_OUTOF10: 0

## 2023-07-06 ASSESSMENT — PAIN - FUNCTIONAL ASSESSMENT: PAIN_FUNCTIONAL_ASSESSMENT: 0-10

## 2023-07-06 ASSESSMENT — PAIN DESCRIPTION - LOCATION: LOCATION: GENERALIZED

## 2023-07-06 ASSESSMENT — LIFESTYLE VARIABLES
HOW MANY STANDARD DRINKS CONTAINING ALCOHOL DO YOU HAVE ON A TYPICAL DAY: PATIENT DOES NOT DRINK
HOW OFTEN DO YOU HAVE A DRINK CONTAINING ALCOHOL: NEVER

## 2023-07-06 NOTE — DISCHARGE INSTRUCTIONS
Tylenol every 4 as if needed for headache or fever, return for uncontrolled nausea vomiting or for worsening symptoms, uncontrolled pain, problems as noted above. Levaquin 750 mg once a day for the next 7 days. Follow-up urine culture results with primary care doctor within the next 4 days. Try to increase fluid intake over the next 2 days.

## 2023-07-06 NOTE — ED PROVIDER NOTES
Progress Note:  Bcx- 1/4 Bottle with gram neg rods, pt on AB.  Nothing further at this      Elio Carolina,   07/07/23 2557

## 2023-07-06 NOTE — ED TRIAGE NOTES
Patient has been having chills since yesterday. No OTC was taken. She also states that her arm and back also have been hurting for quite awhile.

## 2023-07-06 NOTE — ED PROVIDER NOTES
[facility] EMERGENCY DEP  EMERGENCY DEPARTMENT ENCOUNTER       Pt Name: Shayla Avila  MRN: [id number]  [address] [facility] 1960  Date of evaluation: 7/5/2023  Provider: Arland Mortimer, MD   PCP: JAGDISH Simmons - NP  Note Started: 2:57 AM EDT 7/6/23     CHIEF COMPLAINT       Chief Complaint   Patient presents with    Chills     Patient started to get the \"chills\" yesterday. HISTORY OF PRESENT ILLNESS: 1 or more elements      History From: Patient, patients daiughter  Limitations in obtaining HPI include None     Shayla Avila is a 60 y.o. female who presents with daughter complaining of chills. She felt hot at times but fever was not documented. She has had no sweats. Symptoms started tonight. She also has some substernal chest pain which is poorly described. Patient has had left arm numbness and pain for several days, after having a syncopal spell last month. She has recently seen her primary care doctor who hAS treated this with diclofenac and prednisone. Daughter states that the patient gets short of breath with minimal exertion and has easy fatigability. No rhinorrhea. No sore throat. Patient states that it hurts some to breathe, occurring intermittently. No hemoptysis. No diarrhea. No dysuria urgency or frequency. She has mild diffuse abdominal pain, it was noted tonight upon palpation. Is unclear how long this is been present. Patient had a syncopal spell about a month ago and was released back home. She stated tonight that she felt almost as if she was going to pass out, but she did not pass out. .     Nursing Notes were all reviewed and agreed with or any disagreements were addressed in the HPI. REVIEW OF SYSTEMS      Review of Systems   All other systems reviewed and are negative. Positives and Pertinent negatives as per HPI.     PAST HISTORY     Past Medical History:  Past Medical History:   Diagnosis Date    AF (paroxysmal atrial fibrillation) ([address]) 04/2021    Asthma     DJD

## 2023-07-07 ENCOUNTER — HOSPITAL ENCOUNTER (EMERGENCY)
Facility: HOSPITAL | Age: 63
Discharge: HOME | End: 2023-07-07
Attending: EMERGENCY MEDICINE
Payer: MEDICAID

## 2023-07-07 ENCOUNTER — TELEPHONE (OUTPATIENT)
Age: 63
End: 2023-07-07

## 2023-07-07 VITALS
RESPIRATION RATE: 14 BRPM | DIASTOLIC BLOOD PRESSURE: 82 MMHG | OXYGEN SATURATION: 96 % | HEIGHT: 66 IN | TEMPERATURE: 98.1 F | WEIGHT: 245 LBS | BODY MASS INDEX: 39.37 KG/M2 | HEART RATE: 88 BPM | SYSTOLIC BLOOD PRESSURE: 126 MMHG

## 2023-07-07 DIAGNOSIS — N30.00 ACUTE CYSTITIS WITHOUT HEMATURIA: Primary | ICD-10-CM

## 2023-07-07 PROBLEM — M50.30 DDD (DEGENERATIVE DISC DISEASE), CERVICAL: Status: ACTIVE | Noted: 2023-07-07

## 2023-07-07 PROBLEM — M19.012 OSTEOARTHRITIS OF LEFT SHOULDER: Status: ACTIVE | Noted: 2023-07-07

## 2023-07-07 LAB
ANION GAP SERPL CALC-SCNC: 11 MMOL/L (ref 5–15)
APPEARANCE UR: CLEAR
BACTERIA SPEC CULT: NORMAL
BACTERIA URNS QL MICRO: ABNORMAL /HPF
BASOPHILS # BLD: 0 K/UL (ref 0–0.1)
BASOPHILS NFR BLD: 1 % (ref 0–1)
BILIRUB UR QL: NEGATIVE
BUN SERPL-MCNC: 13 MG/DL (ref 6–20)
BUN/CREAT SERPL: 14 (ref 12–20)
CALCIUM SERPL-MCNC: 9.2 MG/DL (ref 8.5–10.1)
CC UR VC: NORMAL
CHLORIDE SERPL-SCNC: 105 MMOL/L (ref 97–108)
CO2 SERPL-SCNC: 27 MMOL/L (ref 21–32)
COLOR UR: ABNORMAL
CREAT SERPL-MCNC: 0.91 MG/DL (ref 0.55–1.02)
DIFFERENTIAL METHOD BLD: ABNORMAL
EOSINOPHIL # BLD: 0.2 K/UL (ref 0–0.4)
EOSINOPHIL NFR BLD: 3 % (ref 0–7)
EPITH CASTS URNS QL MICRO: ABNORMAL /LPF
ERYTHROCYTE [DISTWIDTH] IN BLOOD BY AUTOMATED COUNT: 14.6 % (ref 11.5–14.5)
GLUCOSE SERPL-MCNC: 110 MG/DL (ref 65–100)
GLUCOSE UR STRIP.AUTO-MCNC: NEGATIVE MG/DL
HCT VFR BLD AUTO: 39.9 % (ref 35–47)
HGB BLD-MCNC: 12.6 G/DL (ref 11.5–16)
HGB UR QL STRIP: ABNORMAL
IMM GRANULOCYTES # BLD AUTO: 0 K/UL (ref 0–0.04)
IMM GRANULOCYTES NFR BLD AUTO: 0 % (ref 0–0.5)
KETONES UR QL STRIP.AUTO: NEGATIVE MG/DL
LACTATE SERPL-SCNC: 1 MMOL/L (ref 0.4–2)
LEUKOCYTE ESTERASE UR QL STRIP.AUTO: ABNORMAL
LYMPHOCYTES # BLD: 0.9 K/UL (ref 0.8–3.5)
LYMPHOCYTES NFR BLD: 14 % (ref 12–49)
MCH RBC QN AUTO: 28.3 PG (ref 26–34)
MCHC RBC AUTO-ENTMCNC: 31.6 G/DL (ref 30–36.5)
MCV RBC AUTO: 89.7 FL (ref 80–99)
MONOCYTES # BLD: 0.7 K/UL (ref 0–1)
MONOCYTES NFR BLD: 10 % (ref 5–13)
NEUTS SEG # BLD: 4.6 K/UL (ref 1.8–8)
NEUTS SEG NFR BLD: 72 % (ref 32–75)
NITRITE UR QL STRIP.AUTO: NEGATIVE
NRBC # BLD: 0 K/UL (ref 0–0.01)
NRBC BLD-RTO: 0 PER 100 WBC
PH UR STRIP: 5 (ref 5–8)
PLATELET # BLD AUTO: 204 K/UL (ref 150–400)
PMV BLD AUTO: 9 FL (ref 8.9–12.9)
POTASSIUM SERPL-SCNC: 3 MMOL/L (ref 3.5–5.1)
PROT UR STRIP-MCNC: ABNORMAL MG/DL
RBC # BLD AUTO: 4.45 M/UL (ref 3.8–5.2)
RBC #/AREA URNS HPF: ABNORMAL /HPF (ref 0–5)
SERVICE CMNT-IMP: NORMAL
SODIUM SERPL-SCNC: 143 MMOL/L (ref 136–145)
SP GR UR REFRACTOMETRY: 1.02 (ref 1–1.03)
URINE CULTURE IF INDICATED: ABNORMAL
UROBILINOGEN UR QL STRIP.AUTO: 4 EU/DL (ref 0.2–1)
WBC # BLD AUTO: 6.5 K/UL (ref 3.6–11)
WBC URNS QL MICRO: ABNORMAL /HPF (ref 0–4)

## 2023-07-07 PROCEDURE — 36415 COLL VENOUS BLD VENIPUNCTURE: CPT

## 2023-07-07 PROCEDURE — 81001 URINALYSIS AUTO W/SCOPE: CPT

## 2023-07-07 PROCEDURE — 87040 BLOOD CULTURE FOR BACTERIA: CPT

## 2023-07-07 PROCEDURE — 99283 EMERGENCY DEPT VISIT LOW MDM: CPT

## 2023-07-07 PROCEDURE — 80048 BASIC METABOLIC PNL TOTAL CA: CPT

## 2023-07-07 PROCEDURE — 83605 ASSAY OF LACTIC ACID: CPT

## 2023-07-07 PROCEDURE — 85025 COMPLETE CBC W/AUTO DIFF WBC: CPT

## 2023-07-07 ASSESSMENT — PAIN SCALES - GENERAL
PAINLEVEL_OUTOF10: 0

## 2023-07-07 ASSESSMENT — PAIN - FUNCTIONAL ASSESSMENT
PAIN_FUNCTIONAL_ASSESSMENT: NONE - DENIES PAIN
PAIN_FUNCTIONAL_ASSESSMENT: 0-10

## 2023-07-07 ASSESSMENT — LIFESTYLE VARIABLES
HOW MANY STANDARD DRINKS CONTAINING ALCOHOL DO YOU HAVE ON A TYPICAL DAY: PATIENT DOES NOT DRINK
HOW OFTEN DO YOU HAVE A DRINK CONTAINING ALCOHOL: NEVER
HOW OFTEN DO YOU HAVE A DRINK CONTAINING ALCOHOL: NEVER

## 2023-07-07 NOTE — ED PROVIDER NOTES
DISCONTINUED MEDICATIONS:  Discharge Medication List as of 7/7/2023  6:09 PM          I am the Primary Clinician of Record. Elo Greenwood DO (electronically signed)    (Please note that parts of this dictation were completed with voice recognition software. Quite often unanticipated grammatical, syntax, homophones, and other interpretive errors are inadvertently transcribed by the computer software. Please disregards these errors.  Please excuse any errors that have escaped final proofreading.)          Geronimo Barnes DO  07/07/23 6879

## 2023-07-07 NOTE — ED NOTES
Pt  not able to urinate at this time    pt given water to drink as requested     Guanakito Augustine, OMA  07/07/23 2995

## 2023-07-07 NOTE — ED TRIAGE NOTES
Asked by Dr. Hogue Cool to rturn to Ed for 1 of 2 cultures coming back positive.  Pt was seen here for UTI and placed on atbx

## 2023-07-07 NOTE — DISCHARGE INSTRUCTIONS
Continue on antibiotic that was prescribed last night, your next dose will be this evening    Your blood work look re-markedly better tonight

## 2023-07-08 LAB
ACCESSION NUMBER, LLC1M: ABNORMAL
ACINETOBACTER CALCOAC BAUMANNII COMPLEX BY PCR: NOT DETECTED
B FRAGILIS DNA BLD POS QL NAA+NON-PROBE: NOT DETECTED
BACTERIA SPEC CULT: ABNORMAL
BACTERIA SPEC CULT: ABNORMAL
BIOFIRE TEST COMMENT: ABNORMAL
BLACTX-M ISLT/SPM QL: NOT DETECTED
BLAIMP ISLT/SPM QL: NOT DETECTED
BLAKPC BLD POS QL NAA+NON-PROBE: NOT DETECTED
BLAOXA-48-LIKE ISLT/SPM QL: NOT DETECTED
BLAVIM ISLT/SPM QL: NOT DETECTED
C ALBICANS DNA BLD POS QL NAA+NON-PROBE: NOT DETECTED
C AURIS DNA BLD POS QL NAA+NON-PROBE: NOT DETECTED
C GATTII+NEOFOR DNA BLD POS QL NAA+N-PRB: NOT DETECTED
C GLABRATA DNA BLD POS QL NAA+NON-PROBE: NOT DETECTED
C KRUSEI DNA BLD POS QL NAA+NON-PROBE: NOT DETECTED
C PARAP DNA BLD POS QL NAA+NON-PROBE: NOT DETECTED
C TROPICLS DNA BLD POS QL NAA+NON-PROBE: NOT DETECTED
COLISTIN RES MCR-1 ISLT/SPM QL: NOT DETECTED
E CLOAC COMP DNA BLD POS NAA+NON-PROBE: NOT DETECTED
E COLI DNA BLD POS QL NAA+NON-PROBE: DETECTED
E FAECALIS DNA BLD POS QL NAA+NON-PROBE: NOT DETECTED
E FAECIUM DNA BLD POS QL NAA+NON-PROBE: NOT DETECTED
ENTEROBACTERALES DNA BLD POS NAA+N-PRB: DETECTED
GP B STREP DNA BLD POS QL NAA+NON-PROBE: NOT DETECTED
HAEM INFLU DNA BLD POS QL NAA+NON-PROBE: NOT DETECTED
K OXYTOCA DNA BLD POS QL NAA+NON-PROBE: NOT DETECTED
KLEBSIELLA SP DNA BLD POS QL NAA+NON-PRB: NOT DETECTED
KLEBSIELLA SP DNA BLD POS QL NAA+NON-PRB: NOT DETECTED
L MONOCYTOG DNA BLD POS QL NAA+NON-PROBE: NOT DETECTED
N MEN DNA BLD POS QL NAA+NON-PROBE: NOT DETECTED
P AERUGINOSA DNA BLD POS NAA+NON-PROBE: NOT DETECTED
PROTEUS SP DNA BLD POS QL NAA+NON-PROBE: NOT DETECTED
RESISTANT GENE NDM BY PCR: NOT DETECTED
RESISTANT GENE TARGETS: ABNORMAL
S AUREUS DNA BLD POS QL NAA+NON-PROBE: NOT DETECTED
S AUREUS+CONS DNA BLD POS NAA+NON-PROBE: NOT DETECTED
S EPIDERMIDIS DNA BLD POS QL NAA+NON-PRB: NOT DETECTED
S LUGDUNENSIS DNA BLD POS QL NAA+NON-PRB: NOT DETECTED
S MALTOPHILIA DNA BLD POS QL NAA+NON-PRB: NOT DETECTED
S MARCESCENS DNA BLD POS NAA+NON-PROBE: NOT DETECTED
S PNEUM DNA BLD POS QL NAA+NON-PROBE: NOT DETECTED
S PYO DNA BLD POS QL NAA+NON-PROBE: NOT DETECTED
SALMONELLA DNA BLD POS QL NAA+NON-PROBE: NOT DETECTED
SERVICE CMNT-IMP: ABNORMAL
STREPTOCOCCUS DNA BLD POS NAA+NON-PROBE: NOT DETECTED

## 2023-07-09 LAB
BACTERIA SPEC CULT: NORMAL
BACTERIA SPEC CULT: NORMAL
EKG ATRIAL RATE: 92 BPM
EKG DIAGNOSIS: NORMAL
EKG P AXIS: 41 DEGREES
EKG P-R INTERVAL: 150 MS
EKG Q-T INTERVAL: 378 MS
EKG QRS DURATION: 74 MS
EKG QTC CALCULATION (BAZETT): 467 MS
EKG R AXIS: -8 DEGREES
EKG T AXIS: 55 DEGREES
EKG VENTRICULAR RATE: 92 BPM
SERVICE CMNT-IMP: NORMAL
SERVICE CMNT-IMP: NORMAL

## 2023-07-10 ENCOUNTER — TELEPHONE (OUTPATIENT)
Age: 63
End: 2023-07-10

## 2023-07-10 DIAGNOSIS — M19.012 PRIMARY OSTEOARTHRITIS OF LEFT SHOULDER: ICD-10-CM

## 2023-07-10 DIAGNOSIS — M50.30 DDD (DEGENERATIVE DISC DISEASE), CERVICAL: Primary | ICD-10-CM

## 2023-07-10 LAB
BACTERIA SPEC CULT: ABNORMAL
BACTERIA SPEC CULT: ABNORMAL
SERVICE CMNT-IMP: ABNORMAL

## 2023-07-10 NOTE — TELEPHONE ENCOUNTER
Patient is willing to take PT. Patient is now on Antibiotic want to know if she can still her prednisone.   Not taking diclofenac as advised by ayah.

## 2023-07-10 NOTE — TELEPHONE ENCOUNTER
Yes she can take Prednisone now. She told me she is currently doing PT for another issue so if we can find out where she is going I can send over another referral requesting them to work on her neck and shoulder.

## 2023-07-11 NOTE — TELEPHONE ENCOUNTER
Patient advised about prednisone.   Patient is going to [facility]

## 2023-07-12 LAB
BACTERIA SPEC CULT: ABNORMAL
BACTERIA SPEC CULT: ABNORMAL
BACTERIA SPEC CULT: NORMAL
SERVICE CMNT-IMP: ABNORMAL
SERVICE CMNT-IMP: NORMAL

## 2023-07-13 ENCOUNTER — TELEPHONE (OUTPATIENT)
Age: 63
End: 2023-07-13

## 2023-07-17 ENCOUNTER — CLINICAL DOCUMENTATION (OUTPATIENT)
Age: 63
End: 2023-07-17

## 2023-07-31 RX ORDER — TIZANIDINE 4 MG/1
4 TABLET ORAL 4 TIMES DAILY PRN
Qty: 40 TABLET | Refills: 0 | Status: SHIPPED | OUTPATIENT
Start: 2023-07-31 | End: 2023-08-25 | Stop reason: SDUPTHER

## 2023-08-03 ENCOUNTER — HOSPITAL ENCOUNTER (OUTPATIENT)
Facility: HOSPITAL | Age: 63
Discharge: HOME | End: 2023-08-03
Payer: MEDICAID

## 2023-08-03 PROCEDURE — 97803 MED NUTRITION INDIV SUBSEQ: CPT

## 2023-08-03 RX ORDER — VENLAFAXINE HYDROCHLORIDE 37.5 MG/1
CAPSULE, EXTENDED RELEASE ORAL
Qty: 90 CAPSULE | Refills: 1 | Status: SHIPPED | OUTPATIENT
Start: 2023-08-03

## 2023-08-10 NOTE — PROGRESS NOTES
taken soon. It has been a few yrs.        Individualized Goals:         Goals #1 6-8 hr window to eat-she is meeting this goal most of the time through tracking    #2 Zero calorie beverages-she did this most of the time    #3 Exercise 15 mins-3-5x-does ROM but was not able to start PT yet this is going to be rescheduled    #4                Jake Nieto, RD

## 2023-08-14 DIAGNOSIS — M50.30 DDD (DEGENERATIVE DISC DISEASE), CERVICAL: Primary | ICD-10-CM

## 2023-08-14 RX ORDER — FLUTICASONE PROPIONATE AND SALMETEROL 100; 50 UG/1; UG/1
1 POWDER RESPIRATORY (INHALATION) EVERY 12 HOURS
Qty: 1 EACH | Refills: 5 | Status: SHIPPED | OUTPATIENT
Start: 2023-08-14

## 2023-08-14 RX ORDER — ALENDRONATE SODIUM 70 MG/1
70 TABLET ORAL
Qty: 12 TABLET | Refills: 1 | Status: SHIPPED | OUTPATIENT
Start: 2023-08-14

## 2023-08-14 RX ORDER — GABAPENTIN 300 MG/1
300 CAPSULE ORAL NIGHTLY
Qty: 90 CAPSULE | Refills: 0 | Status: SHIPPED | OUTPATIENT
Start: 2023-08-14 | End: 2023-11-12

## 2023-08-14 NOTE — TELEPHONE ENCOUNTER
Medication Refill Request    [place] Sil[place] is requesting a refill of the following medication(s):     Advair inhaler 100/50  Gabapentin 300 MG  Alendronate 70 MG  Please send refill to:      [address], [address] [phone] [facility] [address] [facility] [phone]  [facility] [address]  Phone: [phone] Fax: [phone]

## 2023-08-21 ENCOUNTER — TELEPHONE (OUTPATIENT)
Age: 63
End: 2023-08-21

## 2023-08-21 ENCOUNTER — HOSPITAL ENCOUNTER (EMERGENCY)
Facility: HOSPITAL | Age: 63
Discharge: HOME | End: 2023-08-21
Payer: MEDICAID

## 2023-08-21 VITALS
HEIGHT: 66 IN | SYSTOLIC BLOOD PRESSURE: 110 MMHG | HEART RATE: 64 BPM | BODY MASS INDEX: 38.41 KG/M2 | DIASTOLIC BLOOD PRESSURE: 67 MMHG | RESPIRATION RATE: 20 BRPM | WEIGHT: 239 LBS | TEMPERATURE: 98 F

## 2023-08-21 DIAGNOSIS — T30.0 BURN: Primary | ICD-10-CM

## 2023-08-21 PROCEDURE — 99282 EMERGENCY DEPT VISIT SF MDM: CPT

## 2023-08-21 PROCEDURE — 16020 DRESS/DEBRID P-THICK BURN S: CPT

## 2023-08-21 RX ORDER — BACITRACIN ZINC 500 [USP'U]/G
OINTMENT TOPICAL
Status: DISCONTINUED | OUTPATIENT
Start: 2023-08-21 | End: 2023-08-22 | Stop reason: HOSPADM

## 2023-08-21 ASSESSMENT — PAIN DESCRIPTION - DESCRIPTORS: DESCRIPTORS: BURNING

## 2023-08-21 ASSESSMENT — PAIN DESCRIPTION - LOCATION: LOCATION: HAND

## 2023-08-21 ASSESSMENT — PAIN SCALES - GENERAL: PAINLEVEL_OUTOF10: 8

## 2023-08-21 ASSESSMENT — PAIN DESCRIPTION - ORIENTATION: ORIENTATION: LEFT

## 2023-08-21 ASSESSMENT — PAIN - FUNCTIONAL ASSESSMENT: PAIN_FUNCTIONAL_ASSESSMENT: 0-10
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
## 2023-08-23 NOTE — ED PROVIDER NOTES
known as: ZYRTEC     Cholecalciferol 50 MCG (2000 UT) Tabs  Take 1 tablet by mouth daily     diclofenac 75 MG EC tablet  Commonly known as: VOLTAREN  Take 1 tablet by mouth 2 times daily     Emgality 120 MG/ML Sosy  Generic drug: Galcanezumab-gnlm     fluticasone-salmeterol 100-50 MCG/ACT Aepb diskus inhaler  Commonly known as: Advair Diskus  Inhale 1 puff into the lungs in the morning and 1 puff in the evening.     gabapentin 300 MG capsule  Commonly known as: NEURONTIN  Take 1 capsule by mouth nightly for 90 days. Max Daily Amount: 300 mg     metoprolol tartrate 50 MG tablet  Commonly known as: LOPRESSOR     omeprazole 20 MG tablet  Commonly known as: PRILOSEC OTC     predniSONE 10 MG tablet  Commonly known as: DELTASONE  take 6 pills today then take 1 less pill every day until gone (#21, 0R)     solifenacin 10 MG tablet  Commonly known as: VESICARE  Take 1 tablet by mouth daily     tiZANidine 4 MG tablet  Commonly known as: ZANAFLEX  Take 1 tablet by mouth 4 times daily as needed (pain)     topiramate 50 MG tablet  Commonly known as: TOPAMAX     * venlafaxine 75 MG extended release capsule  Commonly known as: EFFEXOR XR     * venlafaxine 37.5 MG extended release capsule  Commonly known as: EFFEXOR XR  TAKE 1 CAPSULE BY MOUTH ONCE DAILY WITH  75MG  CAP           * This list has 2 medication(s) that are the same as other medications prescribed for you. Read the directions carefully, and ask your doctor or other care provider to review them with you. DISCONTINUED MEDICATIONS:  Discharge Medication List as of 8/21/2023  9:14 PM          I am the Primary Clinician of Record. Jamison Orellana MD (electronically signed)      (Please note that parts of this dictation were completed with voice recognition software. Quite often unanticipated grammatical, syntax, homophones, and other interpretive errors are inadvertently transcribed by the computer software. Please disregards these errors.  Please excuse

## 2023-08-24 ENCOUNTER — TELEPHONE (OUTPATIENT)
Age: 63
End: 2023-08-24

## 2023-08-24 ENCOUNTER — OFFICE VISIT (OUTPATIENT)
Age: 63
End: 2023-08-24
Payer: MEDICAID

## 2023-08-24 VITALS
DIASTOLIC BLOOD PRESSURE: 70 MMHG | TEMPERATURE: 98.4 F | HEIGHT: 66 IN | RESPIRATION RATE: 16 BRPM | HEART RATE: 96 BPM | OXYGEN SATURATION: 99 % | BODY MASS INDEX: 38.73 KG/M2 | WEIGHT: 241 LBS | SYSTOLIC BLOOD PRESSURE: 119 MMHG

## 2023-08-24 DIAGNOSIS — M81.0 AGE-RELATED OSTEOPOROSIS WITHOUT CURRENT PATHOLOGICAL FRACTURE: ICD-10-CM

## 2023-08-24 DIAGNOSIS — F33.1 MODERATE EPISODE OF RECURRENT MAJOR DEPRESSIVE DISORDER (HCC): ICD-10-CM

## 2023-08-24 DIAGNOSIS — I10 ESSENTIAL (PRIMARY) HYPERTENSION: ICD-10-CM

## 2023-08-24 DIAGNOSIS — M25.50 GENERALIZED JOINT PAIN: ICD-10-CM

## 2023-08-24 DIAGNOSIS — Z79.899 CONTROLLED SUBSTANCE AGREEMENT SIGNED: ICD-10-CM

## 2023-08-24 DIAGNOSIS — T23.262D PARTIAL THICKNESS BURN OF BACK OF LEFT HAND, SUBSEQUENT ENCOUNTER: Primary | ICD-10-CM

## 2023-08-24 DIAGNOSIS — J45.40 MODERATE PERSISTENT ASTHMA, UNCOMPLICATED: ICD-10-CM

## 2023-08-24 DIAGNOSIS — N32.81 OVERACTIVE BLADDER: ICD-10-CM

## 2023-08-24 DIAGNOSIS — I48.0 PAROXYSMAL ATRIAL FIBRILLATION (HCC): ICD-10-CM

## 2023-08-24 DIAGNOSIS — M50.30 DDD (DEGENERATIVE DISC DISEASE), CERVICAL: ICD-10-CM

## 2023-08-24 PROCEDURE — 3078F DIAST BP <80 MM HG: CPT | Performed by: NURSE PRACTITIONER

## 2023-08-24 PROCEDURE — 3074F SYST BP LT 130 MM HG: CPT | Performed by: NURSE PRACTITIONER

## 2023-08-24 PROCEDURE — 99214 OFFICE O/P EST MOD 30 MIN: CPT | Performed by: NURSE PRACTITIONER

## 2023-08-24 SDOH — ECONOMIC STABILITY: FOOD INSECURITY: WITHIN THE PAST 12 MONTHS, YOU WORRIED THAT YOUR FOOD WOULD RUN OUT BEFORE YOU GOT MONEY TO BUY MORE.: NEVER TRUE

## 2023-08-24 SDOH — ECONOMIC STABILITY: FOOD INSECURITY: WITHIN THE PAST 12 MONTHS, THE FOOD YOU BOUGHT JUST DIDN'T LAST AND YOU DIDN'T HAVE MONEY TO GET MORE.: NEVER TRUE

## 2023-08-24 SDOH — ECONOMIC STABILITY: INCOME INSECURITY: HOW HARD IS IT FOR YOU TO PAY FOR THE VERY BASICS LIKE FOOD, HOUSING, MEDICAL CARE, AND HEATING?: NOT HARD AT ALL

## 2023-08-24 ASSESSMENT — PATIENT HEALTH QUESTIONNAIRE - PHQ9
SUM OF ALL RESPONSES TO PHQ QUESTIONS 1-9: 0
SUM OF ALL RESPONSES TO PHQ QUESTIONS 1-9: 0
SUM OF ALL RESPONSES TO PHQ9 QUESTIONS 1 & 2: 0
2. FEELING DOWN, DEPRESSED OR HOPELESS: 0
SUM OF ALL RESPONSES TO PHQ QUESTIONS 1-9: 0
SUM OF ALL RESPONSES TO PHQ QUESTIONS 1-9: 0
1. LITTLE INTEREST OR PLEASURE IN DOING THINGS: 0

## 2023-08-25 RX ORDER — TIZANIDINE 4 MG/1
4 TABLET ORAL 4 TIMES DAILY PRN
Qty: 90 TABLET | Refills: 0 | Status: SHIPPED | OUTPATIENT
Start: 2023-08-25

## 2023-08-25 RX ORDER — GABAPENTIN 600 MG/1
600 TABLET ORAL
Qty: 90 TABLET | Refills: 0
Start: 2023-08-25 | End: 2023-11-25

## 2023-08-30 ENCOUNTER — OFFICE VISIT (OUTPATIENT)
Age: 63
End: 2023-08-30
Payer: MEDICAID

## 2023-08-30 VITALS
WEIGHT: 242.6 LBS | TEMPERATURE: 98.2 F | BODY MASS INDEX: 38.99 KG/M2 | HEIGHT: 66 IN | SYSTOLIC BLOOD PRESSURE: 118 MMHG | DIASTOLIC BLOOD PRESSURE: 62 MMHG | OXYGEN SATURATION: 95 % | RESPIRATION RATE: 16 BRPM | HEART RATE: 82 BPM

## 2023-08-30 DIAGNOSIS — T23.262S PARTIAL THICKNESS BURN OF BACK OF LEFT HAND, SEQUELA: Primary | ICD-10-CM

## 2023-08-30 DIAGNOSIS — E78.5 HYPERLIPIDEMIA, UNSPECIFIED HYPERLIPIDEMIA TYPE: ICD-10-CM

## 2023-08-30 DIAGNOSIS — Z87.898 HISTORY OF PREDIABETES: ICD-10-CM

## 2023-08-30 DIAGNOSIS — Z11.59 ENCOUNTER FOR HEPATITIS C SCREENING TEST FOR LOW RISK PATIENT: ICD-10-CM

## 2023-08-30 PROCEDURE — 3074F SYST BP LT 130 MM HG: CPT | Performed by: NURSE PRACTITIONER

## 2023-08-30 PROCEDURE — 99213 OFFICE O/P EST LOW 20 MIN: CPT | Performed by: NURSE PRACTITIONER

## 2023-08-30 PROCEDURE — 3078F DIAST BP <80 MM HG: CPT | Performed by: NURSE PRACTITIONER

## 2023-08-30 PROCEDURE — 36415 COLL VENOUS BLD VENIPUNCTURE: CPT | Performed by: NURSE PRACTITIONER

## 2023-08-30 RX ORDER — CEFDINIR 300 MG/1
300 CAPSULE ORAL 2 TIMES DAILY
Qty: 20 CAPSULE | Refills: 0 | Status: SHIPPED | OUTPATIENT
Start: 2023-08-30 | End: 2023-09-09

## 2023-08-30 SDOH — ECONOMIC STABILITY: FOOD INSECURITY: WITHIN THE PAST 12 MONTHS, YOU WORRIED THAT YOUR FOOD WOULD RUN OUT BEFORE YOU GOT MONEY TO BUY MORE.: NEVER TRUE

## 2023-08-30 SDOH — ECONOMIC STABILITY: INCOME INSECURITY: HOW HARD IS IT FOR YOU TO PAY FOR THE VERY BASICS LIKE FOOD, HOUSING, MEDICAL CARE, AND HEATING?: NOT HARD AT ALL

## 2023-08-30 SDOH — ECONOMIC STABILITY: FOOD INSECURITY: WITHIN THE PAST 12 MONTHS, THE FOOD YOU BOUGHT JUST DIDN'T LAST AND YOU DIDN'T HAVE MONEY TO GET MORE.: NEVER TRUE

## 2023-08-30 ASSESSMENT — PATIENT HEALTH QUESTIONNAIRE - PHQ9
SUM OF ALL RESPONSES TO PHQ QUESTIONS 1-9: 0
SUM OF ALL RESPONSES TO PHQ QUESTIONS 1-9: 0
SUM OF ALL RESPONSES TO PHQ9 QUESTIONS 1 & 2: 0
2. FEELING DOWN, DEPRESSED OR HOPELESS: 0
1. LITTLE INTEREST OR PLEASURE IN DOING THINGS: 0
SUM OF ALL RESPONSES TO PHQ QUESTIONS 1-9: 0
SUM OF ALL RESPONSES TO PHQ QUESTIONS 1-9: 0

## 2023-08-31 LAB
CHOLEST SERPL-MCNC: 165 MG/DL (ref 100–199)
HBA1C MFR BLD: 5.8 % (ref 4.8–5.6)
HDLC SERPL-MCNC: 59 MG/DL
IMP & REVIEW OF LAB RESULTS: NORMAL
LDLC SERPL CALC-MCNC: 95 MG/DL (ref 0–99)
TRIGL SERPL-MCNC: 57 MG/DL (ref 0–149)
VLDLC SERPL CALC-MCNC: 11 MG/DL (ref 5–40)

## 2023-09-01 LAB — HCV IGG SERPL QL IA: NON REACTIVE

## 2023-09-12 ENCOUNTER — TELEPHONE (OUTPATIENT)
Age: 63
End: 2023-09-12

## 2023-09-12 DIAGNOSIS — M50.30 DDD (DEGENERATIVE DISC DISEASE), CERVICAL: ICD-10-CM

## 2023-09-12 DIAGNOSIS — M25.50 GENERALIZED JOINT PAIN: ICD-10-CM

## 2023-09-12 RX ORDER — GABAPENTIN 600 MG/1
600 TABLET ORAL
Qty: 90 TABLET | Refills: 0 | Status: SHIPPED | OUTPATIENT
Start: 2023-09-12 | End: 2023-12-13

## 2023-09-12 NOTE — TELEPHONE ENCOUNTER
I sent over a 90 day supply last month but it looks like they only gave her a 30 day supply. I have just sent over a refill for another 90 day supply.

## 2023-09-13 ENCOUNTER — OFFICE VISIT (OUTPATIENT)
Age: 63
End: 2023-09-13
Payer: MEDICAID

## 2023-09-13 VITALS — HEIGHT: 66 IN | BODY MASS INDEX: 39.16 KG/M2 | RESPIRATION RATE: 18 BRPM

## 2023-09-13 DIAGNOSIS — T23.262S PARTIAL THICKNESS BURN OF BACK OF LEFT HAND, SEQUELA: Primary | ICD-10-CM

## 2023-09-13 PROCEDURE — 99212 OFFICE O/P EST SF 10 MIN: CPT | Performed by: NURSE PRACTITIONER

## 2023-09-21 NOTE — PROGRESS NOTES
ASSESSMENT and PLAN  1. Paroxysmal atrial fibrillation (HCC)  Database incomplete. Serial monitors since 4/2021 have been normal.  Asymptomatic. No additional testing recommended at this time. 2. Essential hypertension  Well-controlled. No changes recommended. Managed by PCP. Follow-up in a year the patient has been instructed and agrees to call our office with any issues or other concerns related to their cardiac condition(s) and/or complaint(s). CHIEF COMPLAINT  Routine follow-up    HPI:    Lino Albrecht is a 60 y.o. female here for follow-up. She was last seen in the office 6/29/2023 by Scooter Jacome NP. A prolonged event monitor was obtained to evaluate recent syncope which and demonstrated no tachy or bradyarrhythmias. She has a history of atrial fibrillation but serial monitors since 2021 have been normal.  No acute issues have developed since her last visit. She denies palpitations or sustained heart racing. She denies chest pain and she has not had recurrent syncope. PERTINENT CARDIAC HISTORY: (Records reviewed and summarized below)  Atrial fibrillation, paroxysmal  - database incomplete  ==> Echo 4/14/2021, EF 70%, valves nl, RV nl  ==> 48-hour monitor 4/20/2021, HR  bpm, avg 65 bpm, no arrhythmias  ==> 16-day EM 2/2023, HR  bpm, avg 71 bpm, 1% PACs.  ==> Echo 1/27/2023, EF 65%, valves nl, RV nl, PASP 29  ==> 20-day EM 7/2023, HR  bpm, avg 79 bpm, no arrhythmias    Hypertension  Hyperlipidemia  Prediabetes  Obesity  GERD  Asthma  Obstructive sleep apnea, uses CPAP  Fibromyalgia  Migraines  Depression  Restless leg syndrome  Peripheral neuropathy    Past medical history, past surgical history, family history, social history, and medications were all reviewed with the patient today and updated as necessary. Current Outpatient Medications   Medication Sig    gabapentin (NEURONTIN) 600 MG tablet Take 1 tablet by mouth nightly for 92 days.  Max Daily Amount:

## 2023-09-26 ENCOUNTER — TRANSCRIBE ORDERS (OUTPATIENT)
Facility: HOSPITAL | Age: 63
End: 2023-09-26

## 2023-09-26 DIAGNOSIS — Z12.31 SCREENING MAMMOGRAM FOR BREAST CANCER: Primary | ICD-10-CM

## 2023-09-27 ENCOUNTER — TELEPHONE (OUTPATIENT)
Age: 63
End: 2023-09-27

## 2023-09-27 ENCOUNTER — OFFICE VISIT (OUTPATIENT)
Age: 63
End: 2023-09-27
Payer: MEDICAID

## 2023-09-27 VITALS
HEIGHT: 66 IN | RESPIRATION RATE: 24 BRPM | BODY MASS INDEX: 37.61 KG/M2 | DIASTOLIC BLOOD PRESSURE: 82 MMHG | WEIGHT: 234 LBS | OXYGEN SATURATION: 97 % | TEMPERATURE: 97.1 F | SYSTOLIC BLOOD PRESSURE: 112 MMHG | HEART RATE: 81 BPM

## 2023-09-27 DIAGNOSIS — I48.0 PAROXYSMAL ATRIAL FIBRILLATION (HCC): Primary | ICD-10-CM

## 2023-09-27 DIAGNOSIS — I10 ESSENTIAL HYPERTENSION: ICD-10-CM

## 2023-09-27 PROCEDURE — 99213 OFFICE O/P EST LOW 20 MIN: CPT | Performed by: INTERNAL MEDICINE

## 2023-09-27 PROCEDURE — 3079F DIAST BP 80-89 MM HG: CPT | Performed by: INTERNAL MEDICINE

## 2023-09-27 PROCEDURE — 3074F SYST BP LT 130 MM HG: CPT | Performed by: INTERNAL MEDICINE

## 2023-09-27 ASSESSMENT — PATIENT HEALTH QUESTIONNAIRE - PHQ9
SUM OF ALL RESPONSES TO PHQ QUESTIONS 1-9: 2
2. FEELING DOWN, DEPRESSED OR HOPELESS: 1
SUM OF ALL RESPONSES TO PHQ QUESTIONS 1-9: 2
SUM OF ALL RESPONSES TO PHQ QUESTIONS 1-9: 2
1. LITTLE INTEREST OR PLEASURE IN DOING THINGS: 1
SUM OF ALL RESPONSES TO PHQ9 QUESTIONS 1 & 2: 2
SUM OF ALL RESPONSES TO PHQ QUESTIONS 1-9: 2

## 2023-09-27 NOTE — TELEPHONE ENCOUNTER
----- Message from Jake Gill sent at 9/27/2023 11:30 AM EDT -----  Subject: Message to Provider    QUESTIONS  Information for Provider? Pt had a fall on Sunday, is having back and   right leg pain - would like to be seen today 09/27 if anything opens up. States that she is having some difficulty getting around and has been   using a heating pad but it only helps while in use. Pain has been causing   sleep issues. ---------------------------------------------------------------------------  --------------  Jose A MANSFIELD  [phone]; OK to leave message on voicemail  ---------------------------------------------------------------------------  --------------  SCRIPT ANSWERS  Relationship to Patient?  Self

## 2023-09-28 ENCOUNTER — OFFICE VISIT (OUTPATIENT)
Age: 63
End: 2023-09-28
Payer: MEDICAID

## 2023-09-28 VITALS
BODY MASS INDEX: 37.45 KG/M2 | RESPIRATION RATE: 18 BRPM | DIASTOLIC BLOOD PRESSURE: 77 MMHG | OXYGEN SATURATION: 98 % | HEIGHT: 66 IN | HEART RATE: 82 BPM | WEIGHT: 233 LBS | SYSTOLIC BLOOD PRESSURE: 118 MMHG

## 2023-09-28 DIAGNOSIS — S89.81XA HYPEREXTENSION INJURY OF RIGHT KNEE, INITIAL ENCOUNTER: ICD-10-CM

## 2023-09-28 DIAGNOSIS — M15.9 PRIMARY OSTEOARTHRITIS INVOLVING MULTIPLE JOINTS: Primary | ICD-10-CM

## 2023-09-28 DIAGNOSIS — E66.01 CLASS 3 SEVERE OBESITY DUE TO EXCESS CALORIES WITHOUT SERIOUS COMORBIDITY WITH BODY MASS INDEX (BMI) OF 45.0 TO 49.9 IN ADULT (HCC): ICD-10-CM

## 2023-09-28 DIAGNOSIS — S39.012A STRAIN OF LUMBAR REGION, INITIAL ENCOUNTER: ICD-10-CM

## 2023-09-28 PROCEDURE — 3078F DIAST BP <80 MM HG: CPT | Performed by: FAMILY MEDICINE

## 2023-09-28 PROCEDURE — 3074F SYST BP LT 130 MM HG: CPT | Performed by: FAMILY MEDICINE

## 2023-09-28 PROCEDURE — 99213 OFFICE O/P EST LOW 20 MIN: CPT | Performed by: FAMILY MEDICINE

## 2023-09-28 SDOH — ECONOMIC STABILITY: FOOD INSECURITY: WITHIN THE PAST 12 MONTHS, YOU WORRIED THAT YOUR FOOD WOULD RUN OUT BEFORE YOU GOT MONEY TO BUY MORE.: NEVER TRUE

## 2023-09-28 SDOH — ECONOMIC STABILITY: INCOME INSECURITY: HOW HARD IS IT FOR YOU TO PAY FOR THE VERY BASICS LIKE FOOD, HOUSING, MEDICAL CARE, AND HEATING?: NOT HARD AT ALL

## 2023-09-28 SDOH — ECONOMIC STABILITY: FOOD INSECURITY: WITHIN THE PAST 12 MONTHS, THE FOOD YOU BOUGHT JUST DIDN'T LAST AND YOU DIDN'T HAVE MONEY TO GET MORE.: NEVER TRUE

## 2023-09-28 ASSESSMENT — ENCOUNTER SYMPTOMS
EYE REDNESS: 0
SINUS PRESSURE: 0
CHEST TIGHTNESS: 0
NAUSEA: 0
WHEEZING: 0
BLOOD IN STOOL: 0
ANAL BLEEDING: 0
COUGH: 0
FACIAL SWELLING: 0
VOICE CHANGE: 0
SORE THROAT: 0
COLOR CHANGE: 0
EYE PAIN: 0
BACK PAIN: 1
RHINORRHEA: 0
DIARRHEA: 0
CONSTIPATION: 0
ABDOMINAL PAIN: 0
ABDOMINAL DISTENTION: 0
SHORTNESS OF BREATH: 0

## 2023-09-28 ASSESSMENT — PATIENT HEALTH QUESTIONNAIRE - PHQ9
SUM OF ALL RESPONSES TO PHQ QUESTIONS 1-9: 0
SUM OF ALL RESPONSES TO PHQ QUESTIONS 1-9: 0
6. FEELING BAD ABOUT YOURSELF - OR THAT YOU ARE A FAILURE OR HAVE LET YOURSELF OR YOUR FAMILY DOWN: 0
8. MOVING OR SPEAKING SO SLOWLY THAT OTHER PEOPLE COULD HAVE NOTICED. OR THE OPPOSITE, BEING SO FIGETY OR RESTLESS THAT YOU HAVE BEEN MOVING AROUND A LOT MORE THAN USUAL: 0
1. LITTLE INTEREST OR PLEASURE IN DOING THINGS: 0
3. TROUBLE FALLING OR STAYING ASLEEP: 0
SUM OF ALL RESPONSES TO PHQ9 QUESTIONS 1 & 2: 0
10. IF YOU CHECKED OFF ANY PROBLEMS, HOW DIFFICULT HAVE THESE PROBLEMS MADE IT FOR YOU TO DO YOUR WORK, TAKE CARE OF THINGS AT HOME, OR GET ALONG WITH OTHER PEOPLE: 0
SUM OF ALL RESPONSES TO PHQ QUESTIONS 1-9: 0
2. FEELING DOWN, DEPRESSED OR HOPELESS: 0
SUM OF ALL RESPONSES TO PHQ QUESTIONS 1-9: 0
7. TROUBLE CONCENTRATING ON THINGS, SUCH AS READING THE NEWSPAPER OR WATCHING TELEVISION: 0
9. THOUGHTS THAT YOU WOULD BE BETTER OFF DEAD, OR OF HURTING YOURSELF: 0
4. FEELING TIRED OR HAVING LITTLE ENERGY: 0
5. POOR APPETITE OR OVEREATING: 0

## 2023-09-28 ASSESSMENT — ANXIETY QUESTIONNAIRES
6. BECOMING EASILY ANNOYED OR IRRITABLE: 0
IF YOU CHECKED OFF ANY PROBLEMS ON THIS QUESTIONNAIRE, HOW DIFFICULT HAVE THESE PROBLEMS MADE IT FOR YOU TO DO YOUR WORK, TAKE CARE OF THINGS AT HOME, OR GET ALONG WITH OTHER PEOPLE: NOT DIFFICULT AT ALL
4. TROUBLE RELAXING: 0
7. FEELING AFRAID AS IF SOMETHING AWFUL MIGHT HAPPEN: 0
GAD7 TOTAL SCORE: 0
2. NOT BEING ABLE TO STOP OR CONTROL WORRYING: 0
3. WORRYING TOO MUCH ABOUT DIFFERENT THINGS: 0
5. BEING SO RESTLESS THAT IT IS HARD TO SIT STILL: 0
1. FEELING NERVOUS, ANXIOUS, OR ON EDGE: 0

## 2023-09-28 NOTE — PROGRESS NOTES
Lorena Daniels is a 60 y.o. female who presents with the following:  Chief Complaint   Patient presents with    Fall     Patient had fall on Sunday 9/24/23       Patient was washing her and slipped in her bathroom on Sunday falling mostly on her right side and pulled the posterior side of her right knee. The patient's knees generally hurt anyhow because she describes the arthritis is bone-on-bone. The patient has worked on dropping weight and is dropped about 30 pounds but I told her she needed to drop about another 40 before the orthopedic surgeons would consider replacing her knees. Patient states that she also pulled her back and she is somewhat sore in the lower lumbar area but she is having no radiculopathy and no sharp stabbing pains just a dull ache. The patient has her left wrist wrapped up but she says this is not from the fall but it is from a burn which is recovering nicely. The patient states that her gabapentin and tizanidine and diclofenac have been helping considerably. Patient denies any numbness tingling or weakness just the normal arthritis in her knees. Allergies   Allergen Reactions    Ace Inhibitors Angioedema    Aspirin Other (See Comments)    Penicillins Hives    Sulfa Antibiotics      Other reaction(s): Unable to Obtain    Sulfur Hives     Reaction Type: Allergy       Current Outpatient Medications   Medication Sig Dispense Refill    gabapentin (NEURONTIN) 600 MG tablet Take 1 tablet by mouth nightly for 92 days. Max Daily Amount: 600 mg 90 tablet 0    tiZANidine (ZANAFLEX) 4 MG tablet Take 1 tablet by mouth 4 times daily as needed (pain) 90 tablet 0    alendronate (FOSAMAX) 70 MG tablet Take 1 tablet by mouth every 7 days 12 tablet 1    fluticasone-salmeterol (ADVAIR DISKUS) 100-50 MCG/ACT AEPB diskus inhaler Inhale 1 puff into the lungs in the morning and 1 puff in the evening.  1 each 5    venlafaxine (EFFEXOR XR) 37.5 MG extended release capsule TAKE 1 CAPSULE BY MOUTH ONCE

## 2023-10-10 ENCOUNTER — HOSPITAL ENCOUNTER (OUTPATIENT)
Facility: HOSPITAL | Age: 63
Discharge: HOME | End: 2023-10-13
Payer: MEDICAID

## 2023-10-10 DIAGNOSIS — Z12.31 SCREENING MAMMOGRAM FOR BREAST CANCER: ICD-10-CM

## 2023-10-10 PROCEDURE — 77063 BREAST TOMOSYNTHESIS BI: CPT

## 2023-10-17 ENCOUNTER — TELEPHONE (OUTPATIENT)
Age: 63
End: 2023-10-17

## 2023-10-20 RX ORDER — VENLAFAXINE HYDROCHLORIDE 75 MG/1
CAPSULE, EXTENDED RELEASE ORAL
Qty: 90 CAPSULE | Refills: 1 | Status: SHIPPED | OUTPATIENT
Start: 2023-10-20

## 2023-10-20 NOTE — TELEPHONE ENCOUNTER
Medication Refill Request    [facility] is requesting a refill of the following medication(s):   Venlafaxine 75 mg (90 Day)  Please send refill to:      [address], [address] [phone] Chris Lopez [phone]  [facility] [address]  Phone: [phone] Fax: [phone]

## 2023-12-02 RX ORDER — TIZANIDINE 4 MG/1
4 TABLET ORAL 4 TIMES DAILY PRN
Qty: 90 TABLET | Refills: 0 | Status: SHIPPED | OUTPATIENT
Start: 2023-12-02

## 2024-01-11 ENCOUNTER — OFFICE VISIT (OUTPATIENT)
Age: 64
End: 2024-01-11
Payer: COMMERCIAL

## 2024-01-11 VITALS
DIASTOLIC BLOOD PRESSURE: 89 MMHG | BODY MASS INDEX: 38.57 KG/M2 | WEIGHT: 240 LBS | TEMPERATURE: 98.2 F | OXYGEN SATURATION: 99 % | HEART RATE: 53 BPM | SYSTOLIC BLOOD PRESSURE: 130 MMHG | RESPIRATION RATE: 20 BRPM | HEIGHT: 66 IN

## 2024-01-11 DIAGNOSIS — I48.0 PAROXYSMAL ATRIAL FIBRILLATION (HCC): ICD-10-CM

## 2024-01-11 DIAGNOSIS — N32.81 OAB (OVERACTIVE BLADDER): ICD-10-CM

## 2024-01-11 DIAGNOSIS — J45.40 MODERATE PERSISTENT ASTHMA, UNCOMPLICATED: Primary | ICD-10-CM

## 2024-01-11 DIAGNOSIS — M15.9 PRIMARY OSTEOARTHRITIS INVOLVING MULTIPLE JOINTS: ICD-10-CM

## 2024-01-11 DIAGNOSIS — Z12.11 SCREENING FOR COLON CANCER: ICD-10-CM

## 2024-01-11 DIAGNOSIS — E55.9 VITAMIN D DEFICIENCY: ICD-10-CM

## 2024-01-11 DIAGNOSIS — G47.33 OSA (OBSTRUCTIVE SLEEP APNEA): ICD-10-CM

## 2024-01-11 DIAGNOSIS — I10 ESSENTIAL HYPERTENSION: ICD-10-CM

## 2024-01-11 DIAGNOSIS — R73.03 PREDIABETES: ICD-10-CM

## 2024-01-11 DIAGNOSIS — K59.00 CONSTIPATION, UNSPECIFIED CONSTIPATION TYPE: ICD-10-CM

## 2024-01-11 DIAGNOSIS — M81.0 AGE-RELATED OSTEOPOROSIS WITHOUT CURRENT PATHOLOGICAL FRACTURE: ICD-10-CM

## 2024-01-11 DIAGNOSIS — M79.7 FIBROMYALGIA: ICD-10-CM

## 2024-01-11 PROCEDURE — 3079F DIAST BP 80-89 MM HG: CPT | Performed by: NURSE PRACTITIONER

## 2024-01-11 PROCEDURE — 99214 OFFICE O/P EST MOD 30 MIN: CPT | Performed by: NURSE PRACTITIONER

## 2024-01-11 PROCEDURE — 3075F SYST BP GE 130 - 139MM HG: CPT | Performed by: NURSE PRACTITIONER

## 2024-01-11 RX ORDER — METOPROLOL TARTRATE 50 MG/1
50 TABLET, FILM COATED ORAL 2 TIMES DAILY
Qty: 60 TABLET | Refills: 0 | OUTPATIENT
Start: 2024-01-11

## 2024-01-11 RX ORDER — ATORVASTATIN CALCIUM 40 MG/1
40 TABLET, FILM COATED ORAL DAILY
Qty: 90 TABLET | Refills: 1 | Status: SHIPPED | OUTPATIENT
Start: 2024-01-11

## 2024-01-11 RX ORDER — METOPROLOL TARTRATE 50 MG/1
50 TABLET, FILM COATED ORAL 2 TIMES DAILY
Qty: 60 TABLET | Refills: 5 | Status: SHIPPED | OUTPATIENT
Start: 2024-01-11

## 2024-01-11 SDOH — ECONOMIC STABILITY: INCOME INSECURITY: HOW HARD IS IT FOR YOU TO PAY FOR THE VERY BASICS LIKE FOOD, HOUSING, MEDICAL CARE, AND HEATING?: NOT HARD AT ALL

## 2024-01-11 SDOH — ECONOMIC STABILITY: FOOD INSECURITY: WITHIN THE PAST 12 MONTHS, THE FOOD YOU BOUGHT JUST DIDN'T LAST AND YOU DIDN'T HAVE MONEY TO GET MORE.: NEVER TRUE

## 2024-01-11 SDOH — ECONOMIC STABILITY: FOOD INSECURITY: WITHIN THE PAST 12 MONTHS, YOU WORRIED THAT YOUR FOOD WOULD RUN OUT BEFORE YOU GOT MONEY TO BUY MORE.: NEVER TRUE

## 2024-01-11 ASSESSMENT — PATIENT HEALTH QUESTIONNAIRE - PHQ9
SUM OF ALL RESPONSES TO PHQ9 QUESTIONS 1 & 2: 0
5. POOR APPETITE OR OVEREATING: 0
10. IF YOU CHECKED OFF ANY PROBLEMS, HOW DIFFICULT HAVE THESE PROBLEMS MADE IT FOR YOU TO DO YOUR WORK, TAKE CARE OF THINGS AT HOME, OR GET ALONG WITH OTHER PEOPLE: 0
SUM OF ALL RESPONSES TO PHQ QUESTIONS 1-9: 0
3. TROUBLE FALLING OR STAYING ASLEEP: 0
4. FEELING TIRED OR HAVING LITTLE ENERGY: 0
SUM OF ALL RESPONSES TO PHQ QUESTIONS 1-9: 0
2. FEELING DOWN, DEPRESSED OR HOPELESS: 0
SUM OF ALL RESPONSES TO PHQ QUESTIONS 1-9: 0
SUM OF ALL RESPONSES TO PHQ QUESTIONS 1-9: 0
9. THOUGHTS THAT YOU WOULD BE BETTER OFF DEAD, OR OF HURTING YOURSELF: 0
1. LITTLE INTEREST OR PLEASURE IN DOING THINGS: 0
7. TROUBLE CONCENTRATING ON THINGS, SUCH AS READING THE NEWSPAPER OR WATCHING TELEVISION: 0
6. FEELING BAD ABOUT YOURSELF - OR THAT YOU ARE A FAILURE OR HAVE LET YOURSELF OR YOUR FAMILY DOWN: 0
8. MOVING OR SPEAKING SO SLOWLY THAT OTHER PEOPLE COULD HAVE NOTICED. OR THE OPPOSITE, BEING SO FIGETY OR RESTLESS THAT YOU HAVE BEEN MOVING AROUND A LOT MORE THAN USUAL: 0

## 2024-01-11 ASSESSMENT — ANXIETY QUESTIONNAIRES
GAD7 TOTAL SCORE: 4
6. BECOMING EASILY ANNOYED OR IRRITABLE: 0
5. BEING SO RESTLESS THAT IT IS HARD TO SIT STILL: 0
3. WORRYING TOO MUCH ABOUT DIFFERENT THINGS: 1
4. TROUBLE RELAXING: 1
7. FEELING AFRAID AS IF SOMETHING AWFUL MIGHT HAPPEN: 0
2. NOT BEING ABLE TO STOP OR CONTROL WORRYING: 1
1. FEELING NERVOUS, ANXIOUS, OR ON EDGE: 1

## 2024-01-11 NOTE — PROGRESS NOTES
Subjective:     CC: asthma, A-fib, fibromyalgia    Jenny Cain is a 63 y.o. female who presents today to follow up for a routine 6 month follow up for asthma, A-fib, fibromyalgia, and other chronic medical problems.    She is accompanied by her daughter.     Asthma  She is using Advair daily with Albuterol prn, symptoms currently under good control.     Chronic pain  She has a hx of fibromyalgia and reports pain in the hands, shoulders, ankles, knees, and feet.   She has osteoarthritis of bilateral knees. She is in a wheelchair today.   She also has OA of bilateral shoulders and Cervical DDD.     She takes Diclofenac 75mg BID, Tizanidine 4mg QID prn, and Gabapentin 600mg q HS.     She was seeing rheumatologist Dr Castaneda. Thinks he may be retiring. She will let me know if she needs a new referral to another doctor.     Constipation  She mentions she has had to strain to have a bowel movement lately. Recommended OTC fiber supp daily. She does eat a lot of vegetables.      HTN  She is on Metoprolol. BP today at goal.        A-fib  She is followed by cardiologist Dr Myles. On Metoprolol for rate control. Not anticoagulated. Last seen 9-2024, note states she needs to follow up in 1 year.       She went to the ER on 6-11-23 after a syncopal episode. Daughter states that she was washing her cat and asked her mom to come into the bathroom to hand her a towel. When patient walked into the bathroom she felt woozy so daughter sat her down on the toilet and then patient lost of consciousness for about 7 seconds. Per ER note: \" Daughter reports she was clammy.  Daughter held her she did not fall hit her head.  She reports the eyes did roll back.\"     \"Patient awoke and realized that she had passed out.  She reports she has chest pain palpitation shortness of breath and headaches.  She reports 1 episode of nausea vomiting after the passing out spell.  She reports she had a previous passing out spell months ago, did not

## 2024-01-11 NOTE — TELEPHONE ENCOUNTER
Patient requesting refill on     Requested Prescriptions     Pending Prescriptions Disp Refills    metoprolol tartrate (LOPRESSOR) 50 MG tablet [Pharmacy Med Name: Metoprolol Tartrate 50 MG Oral Tablet] 60 tablet 0     Sig: Take 1 tablet by mouth twice daily        Last OV 9/13/2023

## 2024-01-31 ENCOUNTER — NURSE ONLY (OUTPATIENT)
Age: 64
End: 2024-01-31
Payer: COMMERCIAL

## 2024-01-31 DIAGNOSIS — I10 ESSENTIAL HYPERTENSION: ICD-10-CM

## 2024-01-31 DIAGNOSIS — E55.9 VITAMIN D DEFICIENCY: ICD-10-CM

## 2024-01-31 DIAGNOSIS — R73.03 PREDIABETES: ICD-10-CM

## 2024-01-31 LAB
ALBUMIN SERPL-MCNC: 3.8 G/DL (ref 3.5–5)
ALBUMIN/GLOB SERPL: 1.3 (ref 1.1–2.2)
ALP SERPL-CCNC: 109 U/L (ref 45–117)
ALT SERPL-CCNC: 73 U/L (ref 12–78)
ANION GAP SERPL CALC-SCNC: 8 MMOL/L (ref 5–15)
AST SERPL-CCNC: 29 U/L (ref 15–37)
BASOPHILS # BLD: 0.1 K/UL (ref 0–0.1)
BASOPHILS NFR BLD: 1 % (ref 0–1)
BILIRUB SERPL-MCNC: 0.6 MG/DL (ref 0.2–1)
BUN SERPL-MCNC: 17 MG/DL (ref 6–20)
BUN/CREAT SERPL: 22 (ref 12–20)
CALCIUM SERPL-MCNC: 9.1 MG/DL (ref 8.5–10.1)
CHLORIDE SERPL-SCNC: 109 MMOL/L (ref 97–108)
CO2 SERPL-SCNC: 27 MMOL/L (ref 21–32)
CREAT SERPL-MCNC: 0.77 MG/DL (ref 0.55–1.02)
DIFFERENTIAL METHOD BLD: ABNORMAL
EOSINOPHIL # BLD: 0.3 K/UL (ref 0–0.4)
EOSINOPHIL NFR BLD: 4 % (ref 0–7)
ERYTHROCYTE [DISTWIDTH] IN BLOOD BY AUTOMATED COUNT: 15 % (ref 11.5–14.5)
GLOBULIN SER CALC-MCNC: 2.9 G/DL (ref 2–4)
GLUCOSE SERPL-MCNC: 117 MG/DL (ref 65–100)
HCT VFR BLD AUTO: 41.9 % (ref 35–47)
HGB BLD-MCNC: 13.1 G/DL (ref 11.5–16)
IMM GRANULOCYTES # BLD AUTO: 0 K/UL (ref 0–0.04)
IMM GRANULOCYTES NFR BLD AUTO: 0 % (ref 0–0.5)
LYMPHOCYTES # BLD: 1.4 K/UL (ref 0.8–3.5)
LYMPHOCYTES NFR BLD: 19 % (ref 12–49)
MCH RBC QN AUTO: 29.2 PG (ref 26–34)
MCHC RBC AUTO-ENTMCNC: 31.3 G/DL (ref 30–36.5)
MCV RBC AUTO: 93.5 FL (ref 80–99)
MONOCYTES # BLD: 0.8 K/UL (ref 0–1)
MONOCYTES NFR BLD: 11 % (ref 5–13)
NEUTS SEG # BLD: 4.9 K/UL (ref 1.8–8)
NEUTS SEG NFR BLD: 65 % (ref 32–75)
NRBC # BLD: 0 K/UL (ref 0–0.01)
NRBC BLD-RTO: 0 PER 100 WBC
PLATELET # BLD AUTO: 241 K/UL (ref 150–400)
PMV BLD AUTO: 9.9 FL (ref 8.9–12.9)
POTASSIUM SERPL-SCNC: 3.6 MMOL/L (ref 3.5–5.1)
PROT SERPL-MCNC: 6.7 G/DL (ref 6.4–8.2)
RBC # BLD AUTO: 4.48 M/UL (ref 3.8–5.2)
SODIUM SERPL-SCNC: 144 MMOL/L (ref 136–145)
WBC # BLD AUTO: 7.6 K/UL (ref 3.6–11)

## 2024-01-31 PROCEDURE — 36415 COLL VENOUS BLD VENIPUNCTURE: CPT | Performed by: NURSE PRACTITIONER

## 2024-02-01 LAB
25(OH)D3 SERPL-MCNC: 47.6 NG/ML (ref 30–100)
EST. AVERAGE GLUCOSE BLD GHB EST-MCNC: 117 MG/DL
HBA1C MFR BLD: 5.7 % (ref 4–5.6)

## 2024-02-02 NOTE — TELEPHONE ENCOUNTER
Patient requesting refill on     Requested Prescriptions     Pending Prescriptions Disp Refills    venlafaxine (EFFEXOR XR) 37.5 MG extended release capsule 90 capsule 1    venlafaxine (EFFEXOR XR) 75 MG extended release capsule 90 capsule 1     Sig: Take 1 pill daily with a 37.5mg pill        Last OV 1/11/2024

## 2024-02-02 NOTE — TELEPHONE ENCOUNTER
Medication Refill Request    Jenny Cain is requesting a refill of the following medication(s):     Venlafaxine 37.5 ER  Venlafaxine 75 ER    (90 days)      Please send refill to:     [facility] [address] [place] [address] - [address] [facility] [phone] - F [phone]  [address] [facility] [address]  Phone: [phone] Fax: [phone]

## 2024-02-03 RX ORDER — VENLAFAXINE HYDROCHLORIDE 37.5 MG/1
CAPSULE, EXTENDED RELEASE ORAL
Qty: 90 CAPSULE | Refills: 1 | Status: SHIPPED | OUTPATIENT
Start: 2024-02-03

## 2024-02-03 RX ORDER — VENLAFAXINE HYDROCHLORIDE 75 MG/1
CAPSULE, EXTENDED RELEASE ORAL
Qty: 90 CAPSULE | Refills: 1 | Status: SHIPPED | OUTPATIENT
Start: 2024-02-03

## 2024-02-09 ENCOUNTER — TELEPHONE (OUTPATIENT)
Age: 64
End: 2024-02-09

## 2024-02-09 RX ORDER — SOLIFENACIN SUCCINATE 10 MG/1
10 TABLET, FILM COATED ORAL DAILY
Qty: 90 TABLET | Refills: 0 | Status: SHIPPED | OUTPATIENT
Start: 2024-02-09

## 2024-02-09 NOTE — TELEPHONE ENCOUNTER
Requesting refill on:    solifenacin (VESICARE) 10 MG tablet [[id number]]     LOV 1/11/24    [facility] [address] [place] [address] -  [phone] - F [phone] [[id number]]

## 2024-02-10 RX ORDER — SOLIFENACIN SUCCINATE 10 MG/1
10 TABLET, FILM COATED ORAL DAILY
Qty: 90 TABLET | Refills: 0 | OUTPATIENT
Start: 2024-02-10

## 2024-02-22 ENCOUNTER — OFFICE VISIT (OUTPATIENT)
Age: 64
End: 2024-02-22
Payer: COMMERCIAL

## 2024-02-22 DIAGNOSIS — M17.0 BILATERAL PRIMARY OSTEOARTHRITIS OF KNEE: Primary | ICD-10-CM

## 2024-02-22 PROCEDURE — 20610 DRAIN/INJ JOINT/BURSA W/O US: CPT | Performed by: ORTHOPAEDIC SURGERY

## 2024-02-22 PROCEDURE — 99203 OFFICE O/P NEW LOW 30 MIN: CPT | Performed by: ORTHOPAEDIC SURGERY

## 2024-02-22 RX ORDER — BETAMETHASONE SODIUM PHOSPHATE AND BETAMETHASONE ACETATE 3; 3 MG/ML; MG/ML
6 INJECTION, SUSPENSION INTRA-ARTICULAR; INTRALESIONAL; INTRAMUSCULAR; SOFT TISSUE ONCE
Status: COMPLETED | OUTPATIENT
Start: 2024-02-22 | End: 2024-02-22

## 2024-02-22 RX ADMIN — BETAMETHASONE SODIUM PHOSPHATE AND BETAMETHASONE ACETATE 6 MG: 3; 3 INJECTION, SUSPENSION INTRA-ARTICULAR; INTRALESIONAL; INTRAMUSCULAR; SOFT TISSUE at 12:03

## 2024-02-22 RX ADMIN — BETAMETHASONE SODIUM PHOSPHATE AND BETAMETHASONE ACETATE 6 MG: 3; 3 INJECTION, SUSPENSION INTRA-ARTICULAR; INTRALESIONAL; INTRAMUSCULAR; SOFT TISSUE at 12:02

## 2024-02-22 ASSESSMENT — PATIENT HEALTH QUESTIONNAIRE - PHQ9
SUM OF ALL RESPONSES TO PHQ QUESTIONS 1-9: 0
SUM OF ALL RESPONSES TO PHQ QUESTIONS 1-9: 0
1. LITTLE INTEREST OR PLEASURE IN DOING THINGS: 0
2. FEELING DOWN, DEPRESSED OR HOPELESS: 0
SUM OF ALL RESPONSES TO PHQ QUESTIONS 1-9: 0
SUM OF ALL RESPONSES TO PHQ9 QUESTIONS 1 & 2: 0
SUM OF ALL RESPONSES TO PHQ QUESTIONS 1-9: 0

## 2024-02-22 NOTE — PROGRESS NOTES
extension, tibialis anterior, EHL, and FHL.  Sensation is intact to light touch in the L1-S1 dermatomes.  Capillary refill is less than 2 seconds in the toes.    Diagnostics:    Pertinent Diagnostics:  Xrays are available of the bilateral knee, they indicate severe  osteoarthritis of the knee joints, no significant other findings, no other osseus abnormalities, fractures, or dislocations.         Ms. Cain has a reminder for a \"due or due soon\" health maintenance. I have asked that she contact her primary care provider for follow-up on this health maintenance.         Date of Procedure: 2/22/2024  PROCEDURE NOTE: Left knee injection of Celestone    Consent was obtained from the patient. The correct site was identified after confirmation with the patient.  Following identification and confirmation of the correct site with the patient, the superolateral left knee was prepped in the normal sterile fashion.  A local anesthetic of 1% lidocaine without epinephrine was then administered to the local tissues.  Following, an injection of a mixture of  6 mg Celestone and 1% lidocaine without epinephrine was administered to the left knee.  The needle was then withdrawn and the injection site dressed with a sterile bandage at the conclusion.  The procedure was well tolerated by the patient.  No immediate adverse reactions were noted.  Post injection instructions were given.      Diagnosis: Left Knee OsteoarthritisDate of Procedure: 2/22/2024  PROCEDURE NOTE: Right knee injection of Celestone    Consent was obtained from the patient. The correct site was identified after confirmation with the patient.  Following identification and confirmation of the correct site with the patient, the superolateral right knee was prepped in the normal sterile fashion.  A local anesthetic of 1% lidocaine without epinephrine was then administered to the local tissues.  Following, an injection of a mixture of  6 mg Celestone and 1% lidocaine

## 2024-02-26 RX ORDER — CETIRIZINE HYDROCHLORIDE 10 MG/1
10 TABLET, FILM COATED ORAL DAILY
Qty: 90 TABLET | Refills: 1 | Status: SHIPPED | OUTPATIENT
Start: 2024-02-26

## 2024-02-26 NOTE — TELEPHONE ENCOUNTER
Patient requesting refill on     Requested Prescriptions     Pending Prescriptions Disp Refills    EQ ALLERGY RELIEF, CETIRIZINE, 10 MG tablet [Pharmacy Med Name: EQ Allergy Relief (Cetirizine) 10 MG Oral Tablet] 30 tablet 0     Sig: Take 1 tablet by mouth once daily        Last OV 1/11/2024

## 2024-02-29 ENCOUNTER — SCHEDULED TELEPHONE ENCOUNTER (OUTPATIENT)
Age: 64
End: 2024-02-29
Payer: COMMERCIAL

## 2024-02-29 DIAGNOSIS — M17.0 BILATERAL PRIMARY OSTEOARTHRITIS OF KNEE: Primary | ICD-10-CM

## 2024-02-29 PROCEDURE — 99441 PR PHYS/QHP TELEPHONE EVALUATION 5-10 MIN: CPT | Performed by: ORTHOPAEDIC SURGERY

## 2024-02-29 NOTE — PROGRESS NOTES
knee replacements in the future. Patient was in [place] at the time of consultation.    I was in the office while conducting this encounter.    Consent:  She and/or her healthcare decision maker is aware that this patient-initiated Telehealth encounter is a billable service, with coverage as determined by her insurance carrier. She is aware that she may receive a bill and has provided verbal consent to proceed: Yes    This virtual visit was conducted telephone encounter only. -  I affirm this is a Patient Initiated Episode with an Established Patient who has not had a related appointment within my department in the past 7 days or scheduled within the next 24 hours.  Note: this encounter is not billable if this call serves to triage the patient into an appointment for the relevant concern.      Total Time: minutes: 5-10 minutes.        Ms. Cain has a reminder for a \"due or due soon\" health maintenance. I have asked that she contact her primary care provider for follow-up on this health maintenance.

## 2024-03-01 ENCOUNTER — TELEPHONE (OUTPATIENT)
Age: 64
End: 2024-03-01

## 2024-03-01 NOTE — TELEPHONE ENCOUNTER
PT would like to know the phone number of the other Gastro MD that you have given her before. She lost the number. It's not Dr. Santos Jim.

## 2024-03-12 ENCOUNTER — OFFICE VISIT (OUTPATIENT)
Age: 64
End: 2024-03-12
Payer: COMMERCIAL

## 2024-03-12 VITALS
HEIGHT: 66 IN | BODY MASS INDEX: 37.7 KG/M2 | HEART RATE: 86 BPM | TEMPERATURE: 97.8 F | DIASTOLIC BLOOD PRESSURE: 76 MMHG | OXYGEN SATURATION: 98 % | WEIGHT: 234.6 LBS | SYSTOLIC BLOOD PRESSURE: 116 MMHG | RESPIRATION RATE: 16 BRPM

## 2024-03-12 DIAGNOSIS — M50.30 DDD (DEGENERATIVE DISC DISEASE), CERVICAL: ICD-10-CM

## 2024-03-12 DIAGNOSIS — M15.9 PRIMARY OSTEOARTHRITIS INVOLVING MULTIPLE JOINTS: ICD-10-CM

## 2024-03-12 DIAGNOSIS — K59.03 DRUG-INDUCED CONSTIPATION: Primary | ICD-10-CM

## 2024-03-12 DIAGNOSIS — Z12.11 COLON CANCER SCREENING: ICD-10-CM

## 2024-03-12 DIAGNOSIS — M25.50 GENERALIZED JOINT PAIN: ICD-10-CM

## 2024-03-12 DIAGNOSIS — M79.7 FIBROMYALGIA MUSCLE PAIN: ICD-10-CM

## 2024-03-12 PROCEDURE — 3078F DIAST BP <80 MM HG: CPT | Performed by: FAMILY MEDICINE

## 2024-03-12 PROCEDURE — 3074F SYST BP LT 130 MM HG: CPT | Performed by: FAMILY MEDICINE

## 2024-03-12 PROCEDURE — 99213 OFFICE O/P EST LOW 20 MIN: CPT | Performed by: FAMILY MEDICINE

## 2024-03-12 RX ORDER — POLYETHYLENE GLYCOL 3350 17 G/17G
17 POWDER, FOR SOLUTION ORAL DAILY
Qty: 510 G | Refills: 5 | Status: SHIPPED | OUTPATIENT
Start: 2024-03-12 | End: 2024-09-08

## 2024-03-12 RX ORDER — GABAPENTIN 600 MG/1
600 TABLET ORAL
Qty: 90 TABLET | Refills: 0 | Status: SHIPPED | OUTPATIENT
Start: 2024-03-12 | End: 2024-06-12

## 2024-03-12 SDOH — ECONOMIC STABILITY: FOOD INSECURITY: WITHIN THE PAST 12 MONTHS, YOU WORRIED THAT YOUR FOOD WOULD RUN OUT BEFORE YOU GOT MONEY TO BUY MORE.: NEVER TRUE

## 2024-03-12 SDOH — ECONOMIC STABILITY: FOOD INSECURITY: WITHIN THE PAST 12 MONTHS, THE FOOD YOU BOUGHT JUST DIDN'T LAST AND YOU DIDN'T HAVE MONEY TO GET MORE.: NEVER TRUE

## 2024-03-12 SDOH — ECONOMIC STABILITY: INCOME INSECURITY: HOW HARD IS IT FOR YOU TO PAY FOR THE VERY BASICS LIKE FOOD, HOUSING, MEDICAL CARE, AND HEATING?: NOT HARD AT ALL

## 2024-03-12 ASSESSMENT — ENCOUNTER SYMPTOMS
RHINORRHEA: 0
SHORTNESS OF BREATH: 0
VOICE CHANGE: 0
ABDOMINAL PAIN: 0
FACIAL SWELLING: 0
BACK PAIN: 1
EYE REDNESS: 0
NAUSEA: 0
COUGH: 0
ANAL BLEEDING: 0
CHEST TIGHTNESS: 0
ABDOMINAL DISTENTION: 0
SINUS PRESSURE: 0
EYE PAIN: 0
SORE THROAT: 0
WHEEZING: 0
DIARRHEA: 0
BLOOD IN STOOL: 0
COLOR CHANGE: 0
CONSTIPATION: 1

## 2024-03-12 ASSESSMENT — PATIENT HEALTH QUESTIONNAIRE - PHQ9
1. LITTLE INTEREST OR PLEASURE IN DOING THINGS: 1
SUM OF ALL RESPONSES TO PHQ QUESTIONS 1-9: 2
SUM OF ALL RESPONSES TO PHQ9 QUESTIONS 1 & 2: 2
SUM OF ALL RESPONSES TO PHQ QUESTIONS 1-9: 2
2. FEELING DOWN, DEPRESSED OR HOPELESS: 1

## 2024-03-12 ASSESSMENT — COLUMBIA-SUICIDE SEVERITY RATING SCALE - C-SSRS
6. HAVE YOU EVER DONE ANYTHING, STARTED TO DO ANYTHING, OR PREPARED TO DO ANYTHING TO END YOUR LIFE?: NO
2. HAVE YOU ACTUALLY HAD ANY THOUGHTS OF KILLING YOURSELF?: NO
1. WITHIN THE PAST MONTH, HAVE YOU WISHED YOU WERE DEAD OR WISHED YOU COULD GO TO SLEEP AND NOT WAKE UP?: NO

## 2024-03-12 NOTE — PROGRESS NOTES
Chief Complaint   Patient presents with    Chronic Pain       Vitals:    03/12/24 1121   BP: 116/76   Pulse: 86   Resp: 16   Temp: 97.8 °F (36.6 °C)   SpO2: 98%   \"Have you been to the ER, urgent care clinic since your last visit?  Hospitalized since your last visit?\"    NO    “Have you seen or consulted any other health care providers outside of [facility] since your last visit?”    NO    “Have you had a colorectal cancer screening such as a colonoscopy/FIT/Cologuard?    NO       
[place]     Referral Priority:   Routine     Referral Type:   Eval and Treat     Referral Reason:   Specialty Services Required     Referred to Provider:   Mone Garcia MD     Requested Specialty:   Gastroenterology     Number of Visits Requested:   1    [facility] [place] [facility], Rheumatology, [place] ([address]     Referral Priority:   Routine     Referral Type:   Eval and Treat     Referral Reason:   Specialty Services Required     Referral Location:   [facility] - Rhe[facility]ology [facility]     Requested Specialty:   Rheumatology     Number of Visits Requested:   1       No follow-ups on file.     HOMERO Templeton MD

## 2024-03-13 ENCOUNTER — CLINICAL DOCUMENTATION (OUTPATIENT)
Age: 64
End: 2024-03-13

## 2024-03-18 RX ORDER — OMEPRAZOLE 20 MG/1
20 CAPSULE, DELAYED RELEASE ORAL DAILY
Qty: 90 CAPSULE | Refills: 0 | Status: SHIPPED | OUTPATIENT
Start: 2024-03-18

## 2024-03-19 RX ORDER — TIZANIDINE 4 MG/1
4 TABLET ORAL 4 TIMES DAILY PRN
Qty: 90 TABLET | Refills: 0 | Status: SHIPPED | OUTPATIENT
Start: 2024-03-19

## 2024-04-11 ENCOUNTER — CLINICAL DOCUMENTATION (OUTPATIENT)
Age: 64
End: 2024-04-11

## 2024-04-11 RX ORDER — TROSPIUM CHLORIDE 20 MG/1
TABLET, FILM COATED ORAL
Qty: 60 TABLET | Refills: 0
Start: 2024-04-11

## 2024-04-11 RX ORDER — CONJUGATED ESTROGENS 0.62 MG/G
0.5 CREAM VAGINAL DAILY
Qty: 30 G | Refills: 0
Start: 2024-04-11

## 2024-04-11 NOTE — PROGRESS NOTES
Seen by urology Dr Osorio 4-9-24. Referred by GYN Dr Preciado for OAB and recurrent UTIs.  She took Vesicare with no improvement so is now prescribed Myrbetrique. She was also prescribed trospium and vaginal estrogen cream. Discussed importance of avoiding bladder irritants and fluid management. Foul smelling urine in office. Given Macrobid.

## 2024-04-23 ENCOUNTER — OFFICE VISIT (OUTPATIENT)
Age: 64
End: 2024-04-23
Payer: COMMERCIAL

## 2024-04-23 VITALS
HEART RATE: 59 BPM | WEIGHT: 243 LBS | RESPIRATION RATE: 18 BRPM | OXYGEN SATURATION: 99 % | BODY MASS INDEX: 39.05 KG/M2 | SYSTOLIC BLOOD PRESSURE: 152 MMHG | HEIGHT: 66 IN | DIASTOLIC BLOOD PRESSURE: 74 MMHG | TEMPERATURE: 98.1 F

## 2024-04-23 DIAGNOSIS — R32 URINARY INCONTINENCE, UNSPECIFIED TYPE: ICD-10-CM

## 2024-04-23 DIAGNOSIS — J30.2 SEASONAL ALLERGIES: ICD-10-CM

## 2024-04-23 DIAGNOSIS — N32.81 OAB (OVERACTIVE BLADDER): Primary | ICD-10-CM

## 2024-04-23 PROCEDURE — 3078F DIAST BP <80 MM HG: CPT | Performed by: NURSE PRACTITIONER

## 2024-04-23 PROCEDURE — 3077F SYST BP >= 140 MM HG: CPT | Performed by: NURSE PRACTITIONER

## 2024-04-23 PROCEDURE — 99213 OFFICE O/P EST LOW 20 MIN: CPT | Performed by: NURSE PRACTITIONER

## 2024-04-23 RX ORDER — FLUTICASONE PROPIONATE 50 MCG
2 SPRAY, SUSPENSION (ML) NASAL DAILY
Qty: 16 G | Refills: 5 | Status: SHIPPED | OUTPATIENT
Start: 2024-04-23

## 2024-04-23 RX ORDER — KETOTIFEN FUMARATE 0.35 MG/ML
1 SOLUTION/ DROPS OPHTHALMIC 2 TIMES DAILY
Qty: 1 ML | Refills: 0 | Status: SHIPPED | OUTPATIENT
Start: 2024-04-23 | End: 2024-05-03

## 2024-04-23 SDOH — ECONOMIC STABILITY: FOOD INSECURITY: WITHIN THE PAST 12 MONTHS, THE FOOD YOU BOUGHT JUST DIDN'T LAST AND YOU DIDN'T HAVE MONEY TO GET MORE.: NEVER TRUE

## 2024-04-23 SDOH — ECONOMIC STABILITY: FOOD INSECURITY: WITHIN THE PAST 12 MONTHS, YOU WORRIED THAT YOUR FOOD WOULD RUN OUT BEFORE YOU GOT MONEY TO BUY MORE.: NEVER TRUE

## 2024-04-23 SDOH — ECONOMIC STABILITY: INCOME INSECURITY: HOW HARD IS IT FOR YOU TO PAY FOR THE VERY BASICS LIKE FOOD, HOUSING, MEDICAL CARE, AND HEATING?: NOT HARD AT ALL

## 2024-04-23 ASSESSMENT — ANXIETY QUESTIONNAIRES
IF YOU CHECKED OFF ANY PROBLEMS ON THIS QUESTIONNAIRE, HOW DIFFICULT HAVE THESE PROBLEMS MADE IT FOR YOU TO DO YOUR WORK, TAKE CARE OF THINGS AT HOME, OR GET ALONG WITH OTHER PEOPLE: NOT DIFFICULT AT ALL
1. FEELING NERVOUS, ANXIOUS, OR ON EDGE: SEVERAL DAYS
6. BECOMING EASILY ANNOYED OR IRRITABLE: NOT AT ALL
2. NOT BEING ABLE TO STOP OR CONTROL WORRYING: SEVERAL DAYS
3. WORRYING TOO MUCH ABOUT DIFFERENT THINGS: SEVERAL DAYS
5. BEING SO RESTLESS THAT IT IS HARD TO SIT STILL: NOT AT ALL
GAD7 TOTAL SCORE: 4
4. TROUBLE RELAXING: SEVERAL DAYS
7. FEELING AFRAID AS IF SOMETHING AWFUL MIGHT HAPPEN: NOT AT ALL

## 2024-04-23 ASSESSMENT — PATIENT HEALTH QUESTIONNAIRE - PHQ9
SUM OF ALL RESPONSES TO PHQ9 QUESTIONS 1 & 2: 2
SUM OF ALL RESPONSES TO PHQ QUESTIONS 1-9: 4
SUM OF ALL RESPONSES TO PHQ QUESTIONS 1-9: 4
3. TROUBLE FALLING OR STAYING ASLEEP: SEVERAL DAYS
4. FEELING TIRED OR HAVING LITTLE ENERGY: SEVERAL DAYS
8. MOVING OR SPEAKING SO SLOWLY THAT OTHER PEOPLE COULD HAVE NOTICED. OR THE OPPOSITE, BEING SO FIGETY OR RESTLESS THAT YOU HAVE BEEN MOVING AROUND A LOT MORE THAN USUAL: NOT AT ALL
1. LITTLE INTEREST OR PLEASURE IN DOING THINGS: SEVERAL DAYS
9. THOUGHTS THAT YOU WOULD BE BETTER OFF DEAD, OR OF HURTING YOURSELF: NOT AT ALL
6. FEELING BAD ABOUT YOURSELF - OR THAT YOU ARE A FAILURE OR HAVE LET YOURSELF OR YOUR FAMILY DOWN: NOT AT ALL
5. POOR APPETITE OR OVEREATING: NOT AT ALL
2. FEELING DOWN, DEPRESSED OR HOPELESS: SEVERAL DAYS
SUM OF ALL RESPONSES TO PHQ QUESTIONS 1-9: 4
7. TROUBLE CONCENTRATING ON THINGS, SUCH AS READING THE NEWSPAPER OR WATCHING TELEVISION: NOT AT ALL
10. IF YOU CHECKED OFF ANY PROBLEMS, HOW DIFFICULT HAVE THESE PROBLEMS MADE IT FOR YOU TO DO YOUR WORK, TAKE CARE OF THINGS AT HOME, OR GET ALONG WITH OTHER PEOPLE: SOMEWHAT DIFFICULT
SUM OF ALL RESPONSES TO PHQ QUESTIONS 1-9: 4

## 2024-04-23 NOTE — PROGRESS NOTES
Subjective:     CC: OAB    Jenny Cain is a 63 y.o. female who presents today to follow up for OAB.    Seen by urology Dr Osorio 4-9-24. Referred by GYN Dr Preciado for OAB and recurrent UTIs and incontinence.  She took Vesicare with no improvement so was then prescribed Myrbetrique, Trospium, and vaginal estrogen cream. Discussed importance of avoiding bladder irritants and fluid management. Today she states the medications are helping.  Still using about 6 underpads every 24 hours. Needs paperwork completed for insurance coverage.      She mentions the pollen outside is causing her to have a runny nose and itchy watery eyes. She would like a nasal spray and some eye drops.       Patient Active Problem List   Diagnosis    Cholelithiasis    Paroxysmal atrial fibrillation (HCC)    JOSEFINA (obstructive sleep apnea)    Class 3 severe obesity due to excess calories without serious comorbidity in adult ([facility])    Migraine    DJD (degenerative joint disease)    Fibromyalgia    Essential hypertension    Mild intermittent asthma without complication    Hyperlipidemia    Vitamin D deficiency    Moderate episode of recurrent major depressive disorder ([facility])    OAB (overactive bladder)    Peripheral polyneuropathy    RLS (restless legs syndrome)    Gastroesophageal reflux disease    History of prediabetes    DDD (degenerative disc disease), cervical    Osteoarthritis of left shoulder    Generalized joint pain    Drug-induced constipation       Past Medical History:   Diagnosis Date    AF (paroxysmal atrial fibrillation) ([facility]) 04/2021    Asthma     DJD (degenerative joint disease)     Fibromyalgia     GERD (gastroesophageal reflux disease)     Hypertension     Ill-defined condition     cholestrol    Menopause     age 44    Migraine     Rheumatoid arthritis (HCC)     Sleep disorder     sleep apnea uses CPAP at night         Current Outpatient Medications:     mirabegron (MYRBETRIQ) 25 MG TB24, Take 1 tablet by mouth daily, Disp: 30

## 2024-06-18 ENCOUNTER — HOSPITAL ENCOUNTER (EMERGENCY)
Facility: HOSPITAL | Age: 64
Discharge: HOME | End: 2024-06-18
Attending: EMERGENCY MEDICINE
Payer: COMMERCIAL

## 2024-06-18 ENCOUNTER — APPOINTMENT (OUTPATIENT)
Facility: HOSPITAL | Age: 64
End: 2024-06-18
Payer: COMMERCIAL

## 2024-06-18 VITALS
HEART RATE: 63 BPM | OXYGEN SATURATION: 98 % | BODY MASS INDEX: 40.18 KG/M2 | SYSTOLIC BLOOD PRESSURE: 127 MMHG | HEIGHT: 66 IN | RESPIRATION RATE: 14 BRPM | WEIGHT: 250 LBS | TEMPERATURE: 98.3 F | DIASTOLIC BLOOD PRESSURE: 93 MMHG

## 2024-06-18 DIAGNOSIS — G89.29 CHRONIC PAIN OF RIGHT KNEE: Primary | ICD-10-CM

## 2024-06-18 DIAGNOSIS — M25.561 CHRONIC PAIN OF RIGHT KNEE: Primary | ICD-10-CM

## 2024-06-18 DIAGNOSIS — M25.561 ARTHRALGIA OF RIGHT KNEE: ICD-10-CM

## 2024-06-18 LAB — ECHO BSA: 2.3 M2

## 2024-06-18 PROCEDURE — 6370000000 HC RX 637 (ALT 250 FOR IP): Performed by: EMERGENCY MEDICINE

## 2024-06-18 PROCEDURE — 99284 EMERGENCY DEPT VISIT MOD MDM: CPT

## 2024-06-18 PROCEDURE — 73562 X-RAY EXAM OF KNEE 3: CPT

## 2024-06-18 PROCEDURE — 93971 EXTREMITY STUDY: CPT

## 2024-06-18 RX ORDER — OXYCODONE HYDROCHLORIDE 5 MG/1
5 TABLET ORAL ONCE
Status: COMPLETED | OUTPATIENT
Start: 2024-06-18 | End: 2024-06-18

## 2024-06-18 RX ORDER — OXYCODONE HYDROCHLORIDE 5 MG/1
5 TABLET ORAL EVERY 6 HOURS PRN
Qty: 3 TABLET | Refills: 0 | Status: SHIPPED | OUTPATIENT
Start: 2024-06-18 | End: 2024-06-21

## 2024-06-18 RX ADMIN — OXYCODONE 5 MG: 5 TABLET ORAL at 14:34

## 2024-06-18 ASSESSMENT — PAIN DESCRIPTION - DESCRIPTORS: DESCRIPTORS: ACHING

## 2024-06-18 ASSESSMENT — PAIN DESCRIPTION - LOCATION
LOCATION: KNEE
LOCATION: KNEE

## 2024-06-18 ASSESSMENT — PAIN DESCRIPTION - ORIENTATION
ORIENTATION: RIGHT;LEFT
ORIENTATION: LEFT;RIGHT

## 2024-06-18 ASSESSMENT — PAIN - FUNCTIONAL ASSESSMENT
PAIN_FUNCTIONAL_ASSESSMENT: PREVENTS OR INTERFERES WITH MANY ACTIVE NOT PASSIVE ACTIVITIES
PAIN_FUNCTIONAL_ASSESSMENT: 0-10

## 2024-06-18 ASSESSMENT — PAIN SCALES - GENERAL
PAINLEVEL_OUTOF10: 8
PAINLEVEL_OUTOF10: 8

## 2024-06-23 NOTE — ED PROVIDER NOTES
EMERGENCY DEPARTMENT HISTORY AND PHYSICAL EXAM      Date: 6/18/2024  Patient Name: Jenny Cain    History of Presenting Illness     Chief Complaint   Patient presents with    Knee Pain       History Provided By: Patient    HPI: Jenny Cain, 63 y.o. female with PMHx as noted below presents the emergency department with chief complaint of bilateral knee pain.  Patient states this has been ongoing issue for years, has seen pain management.  Note slightly the right knee seems to be more painful and swollen.    PCP: Kita Fair, APRN - NP    No current facility-administered medications for this encounter.     Current Outpatient Medications   Medication Sig Dispense Refill    fluticasone (FLONASE) 50 MCG/ACT nasal spray 2 sprays by Each Nostril route daily 16 g 5    mirabegron (MYRBETRIQ) 25 MG TB24 Take 1 tablet by mouth daily 30 tablet 0    trospium (SANCTURA) 20 MG tablet Exact dose unknown. Per urology 60 tablet 0    estrogens conjugated (PREMARIN) 0.625 MG/GM CREA vaginal cream Place 0.5 g vaginally daily 30 g 0    tiZANidine (ZANAFLEX) 4 MG tablet TAKE 1 TABLET BY MOUTH 4 TIMES DAILY AS NEEDED FOR PAIN 90 tablet 0    omeprazole (PRILOSEC) 20 MG delayed release capsule Take 1 capsule by mouth once daily 90 capsule 0    gabapentin (NEURONTIN) 600 MG tablet Take 1 tablet by mouth nightly for 92 days. Max Daily Amount: 600 mg 90 tablet 0    psyllium (KONSYL) 60.3 % PACK powder Take 1 packet by mouth daily 30 each 5    polyethylene glycol (GLYCOLAX) 17 GM/SCOOP powder Take 17 g by mouth daily 510 g 5    cetirizine (EQ ALLERGY RELIEF, CETIRIZINE,) 10 MG tablet Take 1 tablet by mouth once daily 90 tablet 1    venlafaxine (EFFEXOR XR) 37.5 MG extended release capsule TAKE 1 CAPSULE BY MOUTH ONCE DAILY WITH  75MG  CAP 90 capsule 1    venlafaxine (EFFEXOR XR) 75 MG extended release capsule Take 1 pill daily with a 37.5mg pill 90 capsule 1    metoprolol tartrate (LOPRESSOR) 50 MG tablet Take 1 tablet by mouth 2

## 2024-07-09 ENCOUNTER — HOSPITAL ENCOUNTER (OUTPATIENT)
Facility: HOSPITAL | Age: 64
Setting detail: SPECIMEN
Discharge: HOME | End: 2024-07-12

## 2024-07-09 PROCEDURE — 86200 CCP ANTIBODY: CPT

## 2024-07-09 PROCEDURE — 83036 HEMOGLOBIN GLYCOSYLATED A1C: CPT

## 2024-07-09 PROCEDURE — 84443 ASSAY THYROID STIM HORMONE: CPT

## 2024-07-09 PROCEDURE — 85652 RBC SED RATE AUTOMATED: CPT

## 2024-07-09 PROCEDURE — 80053 COMPREHEN METABOLIC PANEL: CPT

## 2024-07-09 PROCEDURE — 86140 C-REACTIVE PROTEIN: CPT

## 2024-07-09 PROCEDURE — 80307 DRUG TEST PRSMV CHEM ANLYZR: CPT

## 2024-07-09 PROCEDURE — 86431 RHEUMATOID FACTOR QUANT: CPT

## 2024-07-09 PROCEDURE — 85025 COMPLETE CBC W/AUTO DIFF WBC: CPT

## 2024-07-10 LAB
ALBUMIN SERPL-MCNC: 3.6 G/DL (ref 3.5–5)
ALBUMIN/GLOB SERPL: 1.1 (ref 1.1–2.2)
ALP SERPL-CCNC: 102 U/L (ref 45–117)
ALT SERPL-CCNC: 36 U/L (ref 12–78)
AMPHET UR QL SCN: NEGATIVE
ANION GAP SERPL CALC-SCNC: 10 MMOL/L (ref 5–15)
AST SERPL-CCNC: 17 U/L (ref 15–37)
BARBITURATES UR QL SCN: NEGATIVE
BASOPHILS # BLD: 0.1 K/UL (ref 0–0.1)
BASOPHILS NFR BLD: 1 % (ref 0–1)
BENZODIAZ UR QL: NEGATIVE
BILIRUB SERPL-MCNC: 0.6 MG/DL (ref 0.2–1)
BUN SERPL-MCNC: 14 MG/DL (ref 6–20)
BUN/CREAT SERPL: 19 (ref 12–20)
CALCIUM SERPL-MCNC: 8.8 MG/DL (ref 8.5–10.1)
CANNABINOIDS UR QL SCN: NEGATIVE
CHLORIDE SERPL-SCNC: 107 MMOL/L (ref 97–108)
CO2 SERPL-SCNC: 26 MMOL/L (ref 21–32)
COCAINE UR QL SCN: NEGATIVE
CREAT SERPL-MCNC: 0.72 MG/DL (ref 0.55–1.02)
CRP SERPL-MCNC: 0.7 MG/DL (ref 0–0.3)
DIFFERENTIAL METHOD BLD: NORMAL
EOSINOPHIL # BLD: 0.2 K/UL (ref 0–0.4)
EOSINOPHIL NFR BLD: 4 % (ref 0–7)
ERYTHROCYTE [DISTWIDTH] IN BLOOD BY AUTOMATED COUNT: 14.5 % (ref 11.5–14.5)
ERYTHROCYTE [SEDIMENTATION RATE] IN BLOOD: 24 MM/HR (ref 0–30)
EST. AVERAGE GLUCOSE BLD GHB EST-MCNC: 117 MG/DL
GLOBULIN SER CALC-MCNC: 3.2 G/DL (ref 2–4)
GLUCOSE SERPL-MCNC: 95 MG/DL (ref 65–100)
HBA1C MFR BLD: 5.7 % (ref 4–5.6)
HCT VFR BLD AUTO: 40.5 % (ref 35–47)
HGB BLD-MCNC: 13.2 G/DL (ref 11.5–16)
IMM GRANULOCYTES # BLD AUTO: 0 K/UL (ref 0–0.04)
IMM GRANULOCYTES NFR BLD AUTO: 0 % (ref 0–0.5)
LYMPHOCYTES # BLD: 2.1 K/UL (ref 0.8–3.5)
LYMPHOCYTES NFR BLD: 36 % (ref 12–49)
Lab: NORMAL
MCH RBC QN AUTO: 29.4 PG (ref 26–34)
MCHC RBC AUTO-ENTMCNC: 32.6 G/DL (ref 30–36.5)
MCV RBC AUTO: 90.2 FL (ref 80–99)
METHADONE UR QL: NEGATIVE
MONOCYTES # BLD: 0.7 K/UL (ref 0–1)
MONOCYTES NFR BLD: 12 % (ref 5–13)
NEUTS SEG # BLD: 2.8 K/UL (ref 1.8–8)
NEUTS SEG NFR BLD: 47 % (ref 32–75)
NRBC # BLD: 0 K/UL (ref 0–0.01)
NRBC BLD-RTO: 0 PER 100 WBC
OPIATES UR QL: NEGATIVE
PCP UR QL: NEGATIVE
PLATELET # BLD AUTO: 231 K/UL (ref 150–400)
PMV BLD AUTO: 9.7 FL (ref 8.9–12.9)
POTASSIUM SERPL-SCNC: 3.7 MMOL/L (ref 3.5–5.1)
PROT SERPL-MCNC: 6.8 G/DL (ref 6.4–8.2)
RBC # BLD AUTO: 4.49 M/UL (ref 3.8–5.2)
RHEUMATOID FACT SERPL-ACNC: <10 IU/ML
SODIUM SERPL-SCNC: 143 MMOL/L (ref 136–145)
TSH SERPL DL<=0.05 MIU/L-ACNC: 1.8 UIU/ML (ref 0.36–3.74)
WBC # BLD AUTO: 5.8 K/UL (ref 3.6–11)

## 2024-07-11 LAB — CCP IGA+IGG SERPL IA-ACNC: 6 UNITS (ref 0–19)

## 2024-07-15 ENCOUNTER — HOSPITAL ENCOUNTER (EMERGENCY)
Facility: HOSPITAL | Age: 64
Discharge: HOME | End: 2024-07-15
Attending: EMERGENCY MEDICINE
Payer: COMMERCIAL

## 2024-07-15 VITALS
OXYGEN SATURATION: 98 % | HEIGHT: 66 IN | WEIGHT: 240 LBS | TEMPERATURE: 98.4 F | RESPIRATION RATE: 16 BRPM | BODY MASS INDEX: 38.57 KG/M2 | HEART RATE: 66 BPM | DIASTOLIC BLOOD PRESSURE: 74 MMHG | SYSTOLIC BLOOD PRESSURE: 119 MMHG

## 2024-07-15 DIAGNOSIS — H11.31 SUBCONJUNCTIVAL HEMORRHAGE OF RIGHT EYE: Primary | ICD-10-CM

## 2024-07-15 PROCEDURE — 99282 EMERGENCY DEPT VISIT SF MDM: CPT

## 2024-07-15 ASSESSMENT — PAIN - FUNCTIONAL ASSESSMENT: PAIN_FUNCTIONAL_ASSESSMENT: NONE - DENIES PAIN

## 2024-07-15 NOTE — ED PROVIDER NOTES
[facility] EMERGENCY DEP  EMERGENCY DEPARTMENT ENCOUNTER       Pt Name: Jenny Cain  MRN: [id number]  Birthdate 1960  Date of evaluation: 7/15/2024  Provider: Valeria Collins MD   PCP: Kita Fair, JAGDISH - NP  Note Started: 6:35 PM EDT 7/15/24     CHIEF COMPLAINT       Chief Complaint   Patient presents with    Eye Problem        HISTORY OF PRESENT ILLNESS: 1 or more elements      History From: Patient, History limited by: none     Jenny Cain is a 63 y.o. female presents to the emergency department with a painless red right eye.  Noted 3 days ago.       Please See MDM for Additional Details of the HPI/PMH  Nursing Notes were all reviewed and agreed with or any disagreements were addressed in the HPI.     REVIEW OF SYSTEMS        Positives and Pertinent negatives as per HPI.    PAST HISTORY     Past Medical History:  Past Medical History:   Diagnosis Date    AF (paroxysmal atrial fibrillation) ([facility]) 04/2021    Asthma     DJD (degenerative joint disease)     Fibromyalgia     GERD (gastroesophageal reflux disease)     Hypertension     Ill-defined condition     cholestrol    Menopause     age 44    Migraine     Rheumatoid arthritis (HCC)     Sleep disorder     sleep apnea uses CPAP at night       Past Surgical History:  Past Surgical History:   Procedure Laterality Date    CATARACT REMOVAL Right 09/05/2020    HEENT  2021    strabismus correction       Family History:  Family History   Problem Relation Age of Onset    Hypertension Sister     Diabetes Mother        Social History:  Social History     Tobacco Use    Smoking status: Never    Smokeless tobacco: Never   Vaping Use    Vaping Use: Former   Substance Use Topics    Alcohol use: Yes     Alcohol/week: 2.0 standard drinks of alcohol    Drug use: No       Allergies:  Allergies   Allergen Reactions    Ace Inhibitors Angioedema    Aspirin Other (See Comments)    Penicillins Hives    Sulfa Antibiotics      Other reaction(s): Unable to Obtain    Sulfur Hives

## 2024-08-15 ENCOUNTER — CLINICAL DOCUMENTATION (OUTPATIENT)
Age: 64
End: 2024-08-15

## 2024-08-26 RX ORDER — VENLAFAXINE HYDROCHLORIDE 75 MG/1
CAPSULE, EXTENDED RELEASE ORAL
Qty: 90 CAPSULE | Refills: 0 | Status: SHIPPED | OUTPATIENT
Start: 2024-08-26

## 2024-08-26 RX ORDER — VENLAFAXINE HYDROCHLORIDE 37.5 MG/1
CAPSULE, EXTENDED RELEASE ORAL
Qty: 90 CAPSULE | Refills: 0 | Status: SHIPPED | OUTPATIENT
Start: 2024-08-26

## 2024-09-19 NOTE — PROGRESS NOTES
ASSESSMENT and PLAN  1. Paroxysmal atrial fibrillation (HCC)  Database incomplete.  Serial monitors since 4/2021 have been normal.  Asymptomatic.  No additional testing recommended at this time.    2. Primary hypertension  Well-controlled.  No changes recommended.       Follow-up in 1 year.  The patient has been instructed and agrees to call our office with any issues or other concerns related to their cardiac condition(s) and/or complaint(s).      CHIEF COMPLAINT  Routine follow-up    HPI:    Jenny Cain is a 63 y.o. female here for follow-up paroxysmal atrial fibrillation. No cardiac issues have developed since the last visit on 9/27/2023. She occasionally feels prominent heartbeat when she lays on her left side.  Otherwise, she has not experienced sustained palpitations or heart racing.  She denies chest pain and she has not had recurrent syncope.  Her main complaint is fibromyalgia and \"nerves\".    PERTINENT CARDIAC HISTORY: (Records reviewed and summarized below)  Atrial fibrillation, paroxysmal  - database incomplete  ==> Echo 4/14/2021, EF 70%, valves nl, RV nl  ==> 48-hour monitor 4/20/2021, HR  bpm, avg 65 bpm, no arrhythmias  ==> 16-day EM 2/2023, HR  bpm, avg 71 bpm, 1% PACs.  ==> Echo 1/27/2023, EF 65%, valves nl, RV nl, PASP 29  ==> 20-day EM 7/2023, HR  bpm, avg 79 bpm, no arrhythmias    Hypertension  Hyperlipidemia  Prediabetes  Obesity  GERD  Asthma  Obstructive sleep apnea, uses CPAP  Fibromyalgia  Migraines  Depression  Restless leg syndrome  Peripheral neuropathy    Past medical history, past surgical history, family history, social history, and medications were all reviewed with the patient today and updated as necessary.     Current Outpatient Medications   Medication Sig    omeprazole (PRILOSEC) 20 MG delayed release capsule Take 1 capsule by mouth once daily    cetirizine (EQ ALLERGY RELIEF, CETIRIZINE,) 10 MG tablet Take 1 tablet by mouth once daily    atorvastatin

## 2024-09-24 ENCOUNTER — TELEPHONE (OUTPATIENT)
Age: 64
End: 2024-09-24

## 2024-09-24 RX ORDER — CETIRIZINE HYDROCHLORIDE 10 MG/1
10 TABLET, FILM COATED ORAL DAILY
Qty: 90 TABLET | Refills: 1 | Status: SHIPPED | OUTPATIENT
Start: 2024-09-24

## 2024-09-24 RX ORDER — ATORVASTATIN CALCIUM 40 MG/1
40 TABLET, FILM COATED ORAL DAILY
Qty: 90 TABLET | Refills: 1 | Status: SHIPPED | OUTPATIENT
Start: 2024-09-24

## 2024-09-24 NOTE — TELEPHONE ENCOUNTER
Patient said she is returning call because she said someone from our office called. I informed patient that she does have an upcoming appointment on 9/30/24.Patient ask that she be called back.      [phone] cell

## 2024-09-30 ENCOUNTER — OFFICE VISIT (OUTPATIENT)
Age: 64
End: 2024-09-30
Payer: COMMERCIAL

## 2024-09-30 VITALS
DIASTOLIC BLOOD PRESSURE: 70 MMHG | RESPIRATION RATE: 20 BRPM | SYSTOLIC BLOOD PRESSURE: 128 MMHG | OXYGEN SATURATION: 98 % | HEART RATE: 69 BPM | BODY MASS INDEX: 38.89 KG/M2 | WEIGHT: 242 LBS | HEIGHT: 66 IN | TEMPERATURE: 97.3 F

## 2024-09-30 DIAGNOSIS — I10 PRIMARY HYPERTENSION: ICD-10-CM

## 2024-09-30 DIAGNOSIS — I48.0 PAROXYSMAL ATRIAL FIBRILLATION (HCC): Primary | ICD-10-CM

## 2024-09-30 PROCEDURE — 3078F DIAST BP <80 MM HG: CPT | Performed by: INTERNAL MEDICINE

## 2024-09-30 PROCEDURE — 93000 ELECTROCARDIOGRAM COMPLETE: CPT | Performed by: INTERNAL MEDICINE

## 2024-09-30 PROCEDURE — 99214 OFFICE O/P EST MOD 30 MIN: CPT | Performed by: INTERNAL MEDICINE

## 2024-09-30 PROCEDURE — 3074F SYST BP LT 130 MM HG: CPT | Performed by: INTERNAL MEDICINE

## 2024-09-30 ASSESSMENT — PATIENT HEALTH QUESTIONNAIRE - PHQ9
SUM OF ALL RESPONSES TO PHQ9 QUESTIONS 1 & 2: 2
SUM OF ALL RESPONSES TO PHQ QUESTIONS 1-9: 2
SUM OF ALL RESPONSES TO PHQ QUESTIONS 1-9: 2
2. FEELING DOWN, DEPRESSED OR HOPELESS: SEVERAL DAYS
SUM OF ALL RESPONSES TO PHQ QUESTIONS 1-9: 2
1. LITTLE INTEREST OR PLEASURE IN DOING THINGS: SEVERAL DAYS
SUM OF ALL RESPONSES TO PHQ QUESTIONS 1-9: 2

## 2024-09-30 NOTE — PROGRESS NOTES
Identified pt with two pt identifiers(name and ). Reviewed record in preparation for visit and have obtained necessary documentation.  Chief Complaint   Patient presents with    Atrial Fibrillation    Hypertension    Cholesterol Problem      /70 (Site: Left Upper Arm, Position: Sitting, Cuff Size: Large Adult)   Pulse 69   Temp 97.3 °F (36.3 °C) (Temporal)   Resp 20   Ht 1.676 m (5' 6\")   Wt 109.8 kg (242 lb)   SpO2 98%   BMI 39.06 kg/m²       Medications reviewed/approved by provider.      Health Maintenance Review: Patient reminded of \"due or due soon\" health maintenance. I have asked the patient to contact his/her primary care provider (PCP) for follow-up on his/her health maintenance.    Coordination of Care Questionnaire:  :   1) Have you been to an emergency room, urgent care, or hospitalized since your last visit?  If yes, where when, and reason for visit? no       2. Have seen or consulted any other health care provider since your last visit?   If yes, where when, and reason for visit?  no      Patient is accompanied by self I have received verbal consent from Jenny Cain to discuss any/all medical information while they are present in the room.

## 2024-10-15 ENCOUNTER — HOSPITAL ENCOUNTER (OUTPATIENT)
Facility: HOSPITAL | Age: 64
Setting detail: SPECIMEN
Discharge: HOME | End: 2024-10-18

## 2024-10-15 PROCEDURE — 87086 URINE CULTURE/COLONY COUNT: CPT

## 2024-10-17 LAB
BACTERIA SPEC CULT: NORMAL
SERVICE CMNT-IMP: NORMAL

## 2024-10-28 RX ORDER — CETIRIZINE HYDROCHLORIDE 10 MG/1
10 TABLET ORAL DAILY
Qty: 90 TABLET | Refills: 1 | Status: SHIPPED | OUTPATIENT
Start: 2024-10-28

## 2024-10-28 RX ORDER — ALENDRONATE SODIUM 70 MG/1
70 TABLET ORAL
Qty: 12 TABLET | Refills: 1 | Status: SHIPPED | OUTPATIENT
Start: 2024-10-28

## 2024-10-28 RX ORDER — METOPROLOL TARTRATE 50 MG
50 TABLET ORAL 2 TIMES DAILY
Qty: 60 TABLET | Refills: 0 | Status: SHIPPED | OUTPATIENT
Start: 2024-10-28

## 2024-10-28 RX ORDER — VENLAFAXINE HYDROCHLORIDE 75 MG/1
CAPSULE, EXTENDED RELEASE ORAL
Qty: 90 CAPSULE | Refills: 0 | Status: SHIPPED | OUTPATIENT
Start: 2024-10-28

## 2024-10-28 RX ORDER — VENLAFAXINE HYDROCHLORIDE 37.5 MG/1
CAPSULE, EXTENDED RELEASE ORAL
Qty: 90 CAPSULE | Refills: 0 | Status: SHIPPED | OUTPATIENT
Start: 2024-10-28

## 2024-10-28 RX ORDER — ATORVASTATIN CALCIUM 40 MG/1
40 TABLET, FILM COATED ORAL DAILY
Qty: 90 TABLET | Refills: 1 | Status: SHIPPED | OUTPATIENT
Start: 2024-10-28

## 2024-10-28 NOTE — TELEPHONE ENCOUNTER
Medication Refill Request    Jenny Cain is requesting a refill of the following medication(s):     Alendronate Sodium 71 MG Tab  Atorvastatin 40 MG Tab  Cetirizine HCL 10 MG Tab  Metoprolol Tartrate 50 MG Tab  Omeprazole Dr 20 MG Cap  Venlafaxine HCL ER 37.5 MG Cap  Venlafaxine HCL ER 75 MG Cap    Please send refill to:     [facility] - [address] [facility] [address] - P [phone] - F [phone]  [address] [facility] [address]  [place] [address]  Phone: [phone] Fax: [phone]

## 2024-10-30 ENCOUNTER — TELEPHONE (OUTPATIENT)
Age: 64
End: 2024-10-30

## 2024-10-30 NOTE — TELEPHONE ENCOUNTER
Patient needs a prior auth on her Omeprazole. It states that it's been closed and no auth needed but it does need to be done. Insurance will only cover 180 pills every 365 days. So 6 months out of the every year a auth is not needed per the pharmacist at [facility]

## 2024-10-31 NOTE — TELEPHONE ENCOUNTER
Your PA request has been approved. Additional information will be provided in the approval communication. (Message 1353)  Authorization Expiration Date: 10/30/2025

## 2024-10-31 NOTE — TELEPHONE ENCOUNTER
I will attempt to start one on cover my meds, but the insurance company is the one that sent the PA response back as not required.

## 2024-11-04 ENCOUNTER — TELEPHONE (OUTPATIENT)
Age: 64
End: 2024-11-04

## 2024-11-04 NOTE — TELEPHONE ENCOUNTER
Please let patient know that I received a form from a mail order pharmacy called [facility] Does she want all of her meds to be sent there? I just sent a 90 day supply with 1 refill for all her meds to [facility]?

## 2024-11-24 ENCOUNTER — APPOINTMENT (OUTPATIENT)
Facility: HOSPITAL | Age: 64
End: 2024-11-24
Payer: MEDICAID

## 2024-11-24 ENCOUNTER — HOSPITAL ENCOUNTER (EMERGENCY)
Facility: HOSPITAL | Age: 64
Discharge: HOME | End: 2024-11-24
Attending: EMERGENCY MEDICINE
Payer: MEDICAID

## 2024-11-24 VITALS
HEART RATE: 68 BPM | DIASTOLIC BLOOD PRESSURE: 89 MMHG | BODY MASS INDEX: 40.02 KG/M2 | OXYGEN SATURATION: 96 % | WEIGHT: 249 LBS | RESPIRATION RATE: 17 BRPM | TEMPERATURE: 98.5 F | SYSTOLIC BLOOD PRESSURE: 138 MMHG | HEIGHT: 66 IN

## 2024-11-24 DIAGNOSIS — R07.89 CHEST WALL PAIN: Primary | ICD-10-CM

## 2024-11-24 LAB
ALBUMIN SERPL-MCNC: 3.4 G/DL (ref 3.5–5)
ALBUMIN/GLOB SERPL: 1 (ref 1.1–2.2)
ALP SERPL-CCNC: 117 U/L (ref 45–117)
ALT SERPL-CCNC: 31 U/L (ref 12–78)
ANION GAP SERPL CALC-SCNC: 10 MMOL/L (ref 2–12)
AST SERPL-CCNC: 22 U/L (ref 15–37)
BASOPHILS # BLD: 0.1 K/UL (ref 0–0.1)
BASOPHILS NFR BLD: 1 % (ref 0–1)
BILIRUB SERPL-MCNC: 0.6 MG/DL (ref 0.2–1)
BUN SERPL-MCNC: 15 MG/DL (ref 6–20)
BUN/CREAT SERPL: 18 (ref 12–20)
CALCIUM SERPL-MCNC: 9.1 MG/DL (ref 8.5–10.1)
CHLORIDE SERPL-SCNC: 105 MMOL/L (ref 97–108)
CO2 SERPL-SCNC: 29 MMOL/L (ref 21–32)
CREAT SERPL-MCNC: 0.84 MG/DL (ref 0.55–1.02)
DIFFERENTIAL METHOD BLD: NORMAL
EOSINOPHIL # BLD: 0.2 K/UL (ref 0–0.4)
EOSINOPHIL NFR BLD: 4 % (ref 0–7)
ERYTHROCYTE [DISTWIDTH] IN BLOOD BY AUTOMATED COUNT: 13.9 % (ref 11.5–14.5)
GLOBULIN SER CALC-MCNC: 3.3 G/DL (ref 2–4)
GLUCOSE SERPL-MCNC: 97 MG/DL (ref 65–100)
HCT VFR BLD AUTO: 40.3 % (ref 35–47)
HGB BLD-MCNC: 12.9 G/DL (ref 11.5–16)
IMM GRANULOCYTES # BLD AUTO: 0 K/UL (ref 0–0.04)
IMM GRANULOCYTES NFR BLD AUTO: 0 % (ref 0–0.5)
LIPASE SERPL-CCNC: 28 U/L (ref 13–75)
LYMPHOCYTES # BLD: 1.5 K/UL (ref 0.8–3.5)
LYMPHOCYTES NFR BLD: 24 % (ref 12–49)
MCH RBC QN AUTO: 29.1 PG (ref 26–34)
MCHC RBC AUTO-ENTMCNC: 32 G/DL (ref 30–36.5)
MCV RBC AUTO: 90.8 FL (ref 80–99)
MONOCYTES # BLD: 0.8 K/UL (ref 0–1)
MONOCYTES NFR BLD: 13 % (ref 5–13)
NEUTS SEG # BLD: 3.7 K/UL (ref 1.8–8)
NEUTS SEG NFR BLD: 58 % (ref 32–75)
NRBC # BLD: 0 K/UL (ref 0–0.01)
NRBC BLD-RTO: 0 PER 100 WBC
PLATELET # BLD AUTO: 212 K/UL (ref 150–400)
PMV BLD AUTO: 8.9 FL (ref 8.9–12.9)
POTASSIUM SERPL-SCNC: 4.2 MMOL/L (ref 3.5–5.1)
PROT SERPL-MCNC: 6.7 G/DL (ref 6.4–8.2)
RBC # BLD AUTO: 4.44 M/UL (ref 3.8–5.2)
SODIUM SERPL-SCNC: 144 MMOL/L (ref 136–145)
TROPONIN I SERPL HS-MCNC: <4 NG/L (ref 0–51)
WBC # BLD AUTO: 6.3 K/UL (ref 3.6–11)

## 2024-11-24 PROCEDURE — 71045 X-RAY EXAM CHEST 1 VIEW: CPT

## 2024-11-24 PROCEDURE — 6360000002 HC RX W HCPCS: Performed by: EMERGENCY MEDICINE

## 2024-11-24 PROCEDURE — 93005 ELECTROCARDIOGRAM TRACING: CPT | Performed by: EMERGENCY MEDICINE

## 2024-11-24 PROCEDURE — 96374 THER/PROPH/DIAG INJ IV PUSH: CPT

## 2024-11-24 PROCEDURE — 36415 COLL VENOUS BLD VENIPUNCTURE: CPT

## 2024-11-24 PROCEDURE — 83690 ASSAY OF LIPASE: CPT

## 2024-11-24 PROCEDURE — 84484 ASSAY OF TROPONIN QUANT: CPT

## 2024-11-24 PROCEDURE — 85025 COMPLETE CBC W/AUTO DIFF WBC: CPT

## 2024-11-24 PROCEDURE — 80053 COMPREHEN METABOLIC PANEL: CPT

## 2024-11-24 PROCEDURE — 99285 EMERGENCY DEPT VISIT HI MDM: CPT

## 2024-11-24 RX ORDER — KETOROLAC TROMETHAMINE 15 MG/ML
15 INJECTION, SOLUTION INTRAMUSCULAR; INTRAVENOUS ONCE
Status: COMPLETED | OUTPATIENT
Start: 2024-11-24 | End: 2024-11-24

## 2024-11-24 RX ADMIN — KETOROLAC TROMETHAMINE 15 MG: 15 INJECTION, SOLUTION INTRAMUSCULAR; INTRAVENOUS at 10:03

## 2024-11-24 ASSESSMENT — PAIN SCALES - GENERAL
PAINLEVEL_OUTOF10: 9

## 2024-11-24 ASSESSMENT — PAIN - FUNCTIONAL ASSESSMENT: PAIN_FUNCTIONAL_ASSESSMENT: 0-10

## 2024-11-24 ASSESSMENT — PAIN DESCRIPTION - LOCATION: LOCATION: CHEST

## 2024-11-24 ASSESSMENT — PAIN DESCRIPTION - ORIENTATION: ORIENTATION: OTHER (COMMENT)

## 2024-11-24 ASSESSMENT — PAIN DESCRIPTION - DESCRIPTORS: DESCRIPTORS: SHARP

## 2024-11-24 NOTE — ED TRIAGE NOTES
Pt arrived with complaint of chest heaviness. Pt reports the chest heaviness started yesterday and is sharp at times in the center of her chest.  Pt also reports for a couple of days she has had a headache with a sore back.  Pt is awake alert and oriented X 4, pt educated on ER flow.  Pt to room 2 via w/c and needed two person assist to get into stretcher.  Pt and daughter educated on ER flow

## 2024-11-24 NOTE — ED PROVIDER NOTES
[facility] EMERGENCY DEP  EMERGENCY DEPARTMENT ENCOUNTER       Pt Name: Jenny Cain  MRN: [id number]  Birthdate 1960  Date of evaluation: 11/24/2024  Provider: Valeria Collins MD   PCP: Denton Le MD  Note Started: 11:03 AM EST 11/24/24     CHIEF COMPLAINT       Chief Complaint   Patient presents with    Chest Pain        HISTORY OF PRESENT ILLNESS: 1 or more elements      History From: patient, History limited by: none     Jenny Cain is a 63 y.o. female presents the emergency department complaining of chest pain.       Please See MDM for Additional Details of the HPI/PMH  Nursing Notes were all reviewed and agreed with or any disagreements were addressed in the HPI.     REVIEW OF SYSTEMS        Positives and Pertinent negatives as per HPI.    PAST HISTORY     Past Medical History:  Past Medical History:   Diagnosis Date    AF (paroxysmal atrial fibrillation) (HCC) 04/2021    Asthma     DJD (degenerative joint disease)     Fibromyalgia     GERD (gastroesophageal reflux disease)     Hypertension     Ill-defined condition     cholestrol    Menopause     age 44    Migraine     Rheumatoid arthritis (HCC)     Sleep disorder     sleep apnea uses CPAP at night       Past Surgical History:  Past Surgical History:   Procedure Laterality Date    CATARACT REMOVAL Right 09/05/2020    HEENT  2021    strabismus correction       Family History:  Family History   Problem Relation Age of Onset    Hypertension Sister     Diabetes Mother        Social History:  Social History     Tobacco Use    Smoking status: Never    Smokeless tobacco: Never   Vaping Use    Vaping status: Former   Substance Use Topics    Alcohol use: Yes     Alcohol/week: 2.0 standard drinks of alcohol    Drug use: No       Allergies:  Allergies   Allergen Reactions    Ace Inhibitors Angioedema    Aspirin Other (See Comments)    Penicillins Hives    Sulfa Antibiotics      Other reaction(s): Unable to Obtain    Sulfur Hives     Reaction Type: Allergy

## 2024-11-24 NOTE — DISCHARGE INSTRUCTIONS
Please come back to the emergency department immediately for any new or worsening symptoms, especially changes to your chest pain.    Please follow-up with your primary care doctor about your chest pain and joint soreness to discuss further treatment options.

## 2024-11-25 LAB
EKG ATRIAL RATE: 70 BPM
EKG DIAGNOSIS: NORMAL
EKG P AXIS: 17 DEGREES
EKG P-R INTERVAL: 158 MS
EKG Q-T INTERVAL: 434 MS
EKG QRS DURATION: 82 MS
EKG QTC CALCULATION (BAZETT): 468 MS
EKG R AXIS: -4 DEGREES
EKG T AXIS: 30 DEGREES
EKG VENTRICULAR RATE: 70 BPM

## 2024-12-05 ENCOUNTER — TELEPHONE (OUTPATIENT)
Age: 64
End: 2024-12-05

## 2024-12-05 NOTE — TELEPHONE ENCOUNTER
----- Message from Luz Maria SOLORIO sent at 12/5/2024  3:15 PM EST -----  Regarding: ECC Appointment Request  ECC Appointment Request    Patient needs appointment for ECC Appointment Type: Existing Condition Follow Up.    Patient Requested Dates(s):December 10 or 12, 2024  Patient Requested Time:Afternoon  Provider Name:Kita Fair APRN - NP    Reason for Appointment Request: Other patient mentioned that her PCP is Kita Fair APRN - NP and she wants to book her 6 months follow up.  --------------------------------------------------------------------------------------------------------------------------    Relationship to Patient: Self     Call Back Information: OK to leave message on voicemail  Preferred Call Back Number: Phone [phone]

## 2024-12-26 ENCOUNTER — APPOINTMENT (OUTPATIENT)
Facility: HOSPITAL | Age: 64
End: 2024-12-26
Payer: MEDICARE

## 2024-12-26 ENCOUNTER — HOSPITAL ENCOUNTER (EMERGENCY)
Facility: HOSPITAL | Age: 64
Discharge: HOME | End: 2024-12-26
Attending: FAMILY MEDICINE
Payer: MEDICARE

## 2024-12-26 VITALS
HEIGHT: 66 IN | TEMPERATURE: 97.8 F | HEART RATE: 89 BPM | OXYGEN SATURATION: 98 % | DIASTOLIC BLOOD PRESSURE: 78 MMHG | RESPIRATION RATE: 16 BRPM | SYSTOLIC BLOOD PRESSURE: 130 MMHG | BODY MASS INDEX: 40.19 KG/M2

## 2024-12-26 DIAGNOSIS — S09.90XA CLOSED HEAD INJURY, INITIAL ENCOUNTER: Primary | ICD-10-CM

## 2024-12-26 PROCEDURE — 99284 EMERGENCY DEPT VISIT MOD MDM: CPT

## 2024-12-26 PROCEDURE — 96372 THER/PROPH/DIAG INJ SC/IM: CPT

## 2024-12-26 PROCEDURE — 6360000002 HC RX W HCPCS: Performed by: FAMILY MEDICINE

## 2024-12-26 PROCEDURE — 72125 CT NECK SPINE W/O DYE: CPT

## 2024-12-26 PROCEDURE — 70450 CT HEAD/BRAIN W/O DYE: CPT

## 2024-12-26 RX ORDER — NAPROXEN 500 MG/1
500 TABLET ORAL 2 TIMES DAILY PRN
Qty: 20 TABLET | Refills: 0 | Status: SHIPPED | OUTPATIENT
Start: 2024-12-26 | End: 2025-01-05

## 2024-12-26 RX ORDER — KETOROLAC TROMETHAMINE 15 MG/ML
15 INJECTION, SOLUTION INTRAMUSCULAR; INTRAVENOUS
Status: COMPLETED | OUTPATIENT
Start: 2024-12-26 | End: 2024-12-26

## 2024-12-26 RX ADMIN — KETOROLAC TROMETHAMINE 15 MG: 15 INJECTION, SOLUTION INTRAMUSCULAR; INTRAVENOUS at 23:48

## 2024-12-26 ASSESSMENT — PAIN - FUNCTIONAL ASSESSMENT: PAIN_FUNCTIONAL_ASSESSMENT: 0-10

## 2024-12-26 ASSESSMENT — PAIN DESCRIPTION - LOCATION: LOCATION: HEAD

## 2024-12-26 ASSESSMENT — LIFESTYLE VARIABLES
HOW MANY STANDARD DRINKS CONTAINING ALCOHOL DO YOU HAVE ON A TYPICAL DAY: 1 OR 2
HOW OFTEN DO YOU HAVE A DRINK CONTAINING ALCOHOL: MONTHLY OR LESS

## 2024-12-26 ASSESSMENT — PAIN SCALES - GENERAL: PAINLEVEL_OUTOF10: 7

## 2024-12-27 NOTE — ED PROVIDER NOTES
[facility]  EMERGENCY DEPARTMENT ENCOUNTER       Pt Name: Jenny Cain  MRN: [id number]  Birthdate 1960  Date of evaluation: 12/26/2024  Provider: Jarvis Castaneda MD   PCP: Denton Le MD  Note Started: 10:06 PM EST 12/26/24     CHIEF COMPLAINT       Chief Complaint   Patient presents with    Fall        HISTORY OF PRESENT ILLNESS: 1 or more elements      History From: Patient and Patient's Daughter  Limitations in obtaining HPI include None     Jenny Cain is a 64 y.o. female who presents complaining of left-sided headache after a fall.  She has a history of chronic headaches and is taking medication for this but she is unsure what it is.  There is a history of frequent falls and patient walks with a cane.  She struck the left side of her head the day prior contains a persistent headache.  There is been no drainage or discharge or bleeding from the ears or nose.  No change in her vision.  No numbness or tingling in the arms or legs.  No weakness has been noted.  No nausea or vomiting.  Previous medical history includes migraines, rheumatoid arthritis fibromyalgia, peripheral neuropathy, anxiety, Asthma, HTN, atrial fibrillation, and High Cholesterol. Past surgeries include cholecystectomy.    Nursing Notes were all reviewed and agreed with or any disagreements were addressed in the HPI.     REVIEW OF SYSTEMS      Positives and Pertinent negatives as per HPI.    PAST HISTORY     Past Medical History:  Past Medical History:   Diagnosis Date    AF (paroxysmal atrial fibrillation) (HCC) 04/2021    Asthma     DJD (degenerative joint disease)     Fibromyalgia     GERD (gastroesophageal reflux disease)     Hypertension     Ill-defined condition     cholestrol    Menopause     age 44    Migraine     Rheumatoid arthritis (HCC)     Sleep disorder     sleep apnea uses CPAP at night         Past Surgical History:  Past Surgical History:   Procedure Laterality Date     [facility] [address]      Phone: [phone]   naproxen 500 MG tablet           DISCONTINUED MEDICATIONS:  Discharge Medication List as of 12/26/2024 11:37 PM          I am the Primary Clinician of Record.   Jarvis Castaneda MD (electronically signed)      (Please note that parts of this dictation were completed with voice recognition software. Quite often unanticipated grammatical, syntax, homophones, and other interpretive errors are inadvertently transcribed by the computer software. Please disregards these errors. Please excuse any errors that have escaped final proofreading.)         Jarvis Castaneda MD  12/28/24 6522

## 2024-12-27 NOTE — DISCHARGE INSTRUCTIONS
Naproxen 500 mg twice a day with food as needed for pain supplement with Tylenol.  Heat to areas that hurt 20 minutes to the hour while awake.  Return if worse or for changes as noted above.  Follow-up with family doctor for improving within the next week.

## 2024-12-27 NOTE — ED TRIAGE NOTES
Patient came in with c/o head pain due to a fall that happened yesterday. Patient stated that she was trying to get into the bath when she lost her balance and fell. Patient denies any LOC, or nausea.

## 2025-01-21 ENCOUNTER — HOSPITAL ENCOUNTER (OUTPATIENT)
Facility: HOSPITAL | Age: 65
Setting detail: SPECIMEN
Discharge: HOME | End: 2025-01-24

## 2025-01-21 PROCEDURE — 83036 HEMOGLOBIN GLYCOSYLATED A1C: CPT

## 2025-01-21 PROCEDURE — 80061 LIPID PANEL: CPT

## 2025-01-22 LAB
CHOLEST SERPL-MCNC: 190 MG/DL
EST. AVERAGE GLUCOSE BLD GHB EST-MCNC: 117 MG/DL
HBA1C MFR BLD: 5.7 % (ref 4–5.6)
HDLC SERPL-MCNC: 83 MG/DL
HDLC SERPL: 2.3 (ref 0–5)
LDLC SERPL CALC-MCNC: 97.2 MG/DL (ref 0–100)
TRIGL SERPL-MCNC: 49 MG/DL
VLDLC SERPL CALC-MCNC: 9.8 MG/DL

## 2025-01-24 ENCOUNTER — HOSPITAL ENCOUNTER (EMERGENCY)
Facility: HOSPITAL | Age: 65
Discharge: HOME | End: 2025-01-25
Attending: FAMILY MEDICINE | Admitting: FAMILY MEDICINE
Payer: MEDICARE

## 2025-01-24 ENCOUNTER — APPOINTMENT (OUTPATIENT)
Facility: HOSPITAL | Age: 65
End: 2025-01-24
Payer: MEDICARE

## 2025-01-24 DIAGNOSIS — R07.9 CHEST PAIN, UNSPECIFIED TYPE: Primary | ICD-10-CM

## 2025-01-24 DIAGNOSIS — R06.02 SHORTNESS OF BREATH: ICD-10-CM

## 2025-01-24 LAB
ALBUMIN SERPL-MCNC: 4 G/DL (ref 3.5–5)
ALBUMIN/GLOB SERPL: 1.2 (ref 1.1–2.2)
ALP SERPL-CCNC: 107 U/L (ref 45–117)
ALT SERPL-CCNC: 24 U/L (ref 12–78)
ANION GAP SERPL CALC-SCNC: 7 MMOL/L (ref 2–12)
AST SERPL-CCNC: 17 U/L (ref 15–37)
BASOPHILS # BLD: 0.06 K/UL (ref 0–0.1)
BASOPHILS NFR BLD: 1 % (ref 0–1)
BILIRUB SERPL-MCNC: 0.6 MG/DL (ref 0.2–1)
BUN SERPL-MCNC: 16 MG/DL (ref 6–20)
BUN/CREAT SERPL: 24 (ref 12–20)
CALCIUM SERPL-MCNC: 10.1 MG/DL (ref 8.5–10.1)
CHLORIDE SERPL-SCNC: 106 MMOL/L (ref 97–108)
CO2 SERPL-SCNC: 31 MMOL/L (ref 21–32)
CREAT SERPL-MCNC: 0.67 MG/DL (ref 0.55–1.02)
DIFFERENTIAL METHOD BLD: ABNORMAL
EOSINOPHIL # BLD: 0.3 K/UL (ref 0–0.4)
EOSINOPHIL NFR BLD: 5 % (ref 0–0.7)
ERYTHROCYTE [DISTWIDTH] IN BLOOD BY AUTOMATED COUNT: 14 % (ref 11.5–14.5)
FLUAV RNA SPEC QL NAA+PROBE: NOT DETECTED
FLUBV RNA SPEC QL NAA+PROBE: NOT DETECTED
GLOBULIN SER CALC-MCNC: 3.4 G/DL (ref 2–4)
GLUCOSE SERPL-MCNC: 101 MG/DL (ref 65–100)
HCT VFR BLD AUTO: 44.6 % (ref 35–47)
HGB BLD-MCNC: 14.2 G/DL (ref 11.5–16)
IMM GRANULOCYTES # BLD AUTO: 0.01 K/UL (ref 0–0.04)
IMM GRANULOCYTES NFR BLD AUTO: 0.2 % (ref 0–0.5)
LYMPHOCYTES # BLD: 2.29 K/UL (ref 0.8–3.5)
LYMPHOCYTES NFR BLD: 38.2 % (ref 12–49)
MCH RBC QN AUTO: 28.9 PG (ref 26–34)
MCHC RBC AUTO-ENTMCNC: 31.8 G/DL (ref 30–36.5)
MCV RBC AUTO: 90.7 FL (ref 80–99)
MONOCYTES # BLD: 0.64 K/UL (ref 0–1)
MONOCYTES NFR BLD: 10.6 % (ref 5–13)
NEUTS SEG # BLD: 2.7 K/UL (ref 1.8–8)
NEUTS SEG NFR BLD: 45 % (ref 32–75)
NRBC # BLD: 0 K/UL (ref 0–0.01)
NRBC BLD-RTO: 0 PER 100 WBC
PLATELET # BLD AUTO: 224 K/UL (ref 150–400)
PLATELET COMMENT: ADEQUATE
PMV BLD AUTO: 9 FL (ref 8.9–12.9)
POTASSIUM SERPL-SCNC: 3.5 MMOL/L (ref 3.5–5.1)
PROT SERPL-MCNC: 7.4 G/DL (ref 6.4–8.2)
RBC # BLD AUTO: 4.92 M/UL (ref 3.8–5.2)
RBC MORPH BLD: ABNORMAL
RBC MORPH BLD: ABNORMAL
SARS-COV-2 RNA RESP QL NAA+PROBE: NOT DETECTED
SODIUM SERPL-SCNC: 144 MMOL/L (ref 136–145)
SOURCE: NORMAL
TROPONIN I SERPL HS-MCNC: 4 NG/L (ref 0–51)
WBC # BLD AUTO: 6 K/UL (ref 3.6–11)

## 2025-01-24 PROCEDURE — 87636 SARSCOV2 & INF A&B AMP PRB: CPT

## 2025-01-24 PROCEDURE — 93005 ELECTROCARDIOGRAM TRACING: CPT | Performed by: FAMILY MEDICINE

## 2025-01-24 PROCEDURE — 71046 X-RAY EXAM CHEST 2 VIEWS: CPT

## 2025-01-24 PROCEDURE — 36415 COLL VENOUS BLD VENIPUNCTURE: CPT

## 2025-01-24 PROCEDURE — 84484 ASSAY OF TROPONIN QUANT: CPT

## 2025-01-24 PROCEDURE — 99285 EMERGENCY DEPT VISIT HI MDM: CPT

## 2025-01-24 PROCEDURE — 85025 COMPLETE CBC W/AUTO DIFF WBC: CPT

## 2025-01-24 PROCEDURE — 80053 COMPREHEN METABOLIC PANEL: CPT

## 2025-01-24 ASSESSMENT — PAIN SCALES - GENERAL: PAINLEVEL_OUTOF10: 0

## 2025-01-24 ASSESSMENT — PAIN - FUNCTIONAL ASSESSMENT: PAIN_FUNCTIONAL_ASSESSMENT: NONE - DENIES PAIN

## 2025-01-24 ASSESSMENT — LIFESTYLE VARIABLES
HOW OFTEN DO YOU HAVE A DRINK CONTAINING ALCOHOL: MONTHLY OR LESS
HOW MANY STANDARD DRINKS CONTAINING ALCOHOL DO YOU HAVE ON A TYPICAL DAY: 1 OR 2

## 2025-01-25 VITALS
OXYGEN SATURATION: 99 % | SYSTOLIC BLOOD PRESSURE: 165 MMHG | WEIGHT: 265 LBS | HEART RATE: 62 BPM | DIASTOLIC BLOOD PRESSURE: 85 MMHG | BODY MASS INDEX: 42.59 KG/M2 | TEMPERATURE: 97.8 F | RESPIRATION RATE: 16 BRPM | HEIGHT: 66 IN

## 2025-01-25 LAB
EKG ATRIAL RATE: 57 BPM
EKG DIAGNOSIS: NORMAL
EKG P AXIS: 15 DEGREES
EKG P-R INTERVAL: 176 MS
EKG Q-T INTERVAL: 480 MS
EKG QRS DURATION: 78 MS
EKG QTC CALCULATION (BAZETT): 467 MS
EKG R AXIS: -7 DEGREES
EKG T AXIS: 28 DEGREES
EKG VENTRICULAR RATE: 57 BPM

## 2025-01-25 ASSESSMENT — PAIN SCALES - GENERAL: PAINLEVEL_OUTOF10: 0

## 2025-01-25 NOTE — ED TRIAGE NOTES
Pt reports SOB and cough that started at 1815 today. Used her Albuterol Inhaler with onset with no relief of symptoms per pt.

## 2025-01-25 NOTE — DISCHARGE INSTRUCTIONS
--Your blood tests and EKG are normal.   --If this happens again, you can try some Mylanta. If this does not help within about 20 minutes, then come to the ED for evaluation.

## 2025-01-25 NOTE — ED PROVIDER NOTES
[facility] EMERGENCY DEPARTMENT  EMERGENCY DEPARTMENT ENCOUNTER       Pt Name: Jenny Cain  MRN: [id number]  Birthdate 1960  Date of evaluation: 1/24/2025  Provider: Katelynn Stevenson MD   PCP: Denton Le MD  Note Started: 11:44 PM EST 1/24/25     CHIEF COMPLAINT       Chief Complaint   Patient presents with    Shortness of Breath        HISTORY OF PRESENT ILLNESS: 1 or more elements      History From: Patient  HPI Limitations: None     Jenny Cain is a 64 y.o. female who presents to the ED with shortness of breath and chest tightness that started about 2 hours ago.      Nursing Notes were all reviewed and agreed with or any disagreements were addressed in the HPI.     REVIEW OF SYSTEMS      Review of Systems     Positives and Pertinent negatives as per HPI.    PAST HISTORY     Past Medical History:  Past Medical History:   Diagnosis Date    AF (paroxysmal atrial fibrillation) (HCC) 04/2021    Asthma     DJD (degenerative joint disease)     Fibromyalgia     GERD (gastroesophageal reflux disease)     Hypertension     Ill-defined condition     cholestrol    Menopause     age 44    Migraine     Rheumatoid arthritis (HCC)     Sleep disorder     sleep apnea uses CPAP at night         Past Surgical History:  Past Surgical History:   Procedure Laterality Date    CATARACT REMOVAL Right 09/05/2020    HEENT  2021    strabismus correction       Family History:  Family History   Problem Relation Age of Onset    Hypertension Sister     Diabetes Mother        Social History:  Social History     Tobacco Use    Smoking status: Never     Passive exposure: Never    Smokeless tobacco: Never   Vaping Use    Vaping status: Former   Substance Use Topics    Alcohol use: Yes     Alcohol/week: 2.0 standard drinks of alcohol    Drug use: No       Allergies:  Allergies   Allergen Reactions    Ace Inhibitors Angioedema    Aspirin Other (See Comments)    Penicillins Hives    Sulfa Antibiotics      Other reaction(s):

## 2025-01-26 ASSESSMENT — HEART SCORE: ECG: NORMAL

## 2025-01-31 ENCOUNTER — TELEPHONE (OUTPATIENT)
Age: 65
End: 2025-01-31

## 2025-01-31 RX ORDER — ALBUTEROL SULFATE 90 UG/1
2 INHALANT RESPIRATORY (INHALATION) EVERY 4 HOURS PRN
Qty: 18 G | Refills: 0 | Status: SHIPPED | OUTPATIENT
Start: 2025-01-31

## 2025-01-31 RX ORDER — CEPHALEXIN 250 MG/1
CAPSULE ORAL
Qty: 60 EACH | Refills: 5 | Status: SHIPPED | OUTPATIENT
Start: 2025-01-31

## 2025-01-31 RX ORDER — NAPROXEN 500 MG/1
500 TABLET ORAL 2 TIMES DAILY PRN
Qty: 60 TABLET | Refills: 2 | Status: SHIPPED | OUTPATIENT
Start: 2025-01-31

## 2025-01-31 NOTE — TELEPHONE ENCOUNTER
PT needs refills on:    albuterol sulfate HFA (PROVENTIL;VENTOLIN;PROAIR) 108 (90 Base) MCG/ACT inhaler [[id number]]     Also, asking for Advil. Do not see on her list    [facility] [address] [place] [address] - [address] [place] [address] -  [phone] - F [phone]

## 2025-02-05 ENCOUNTER — APPOINTMENT (OUTPATIENT)
Facility: HOSPITAL | Age: 65
End: 2025-02-05
Payer: MEDICARE

## 2025-02-05 ENCOUNTER — HOSPITAL ENCOUNTER (EMERGENCY)
Facility: HOSPITAL | Age: 65
Discharge: HOME | End: 2025-02-05
Attending: EMERGENCY MEDICINE
Payer: MEDICARE

## 2025-02-05 DIAGNOSIS — K11.20 SIALOADENITIS OF SUBMANDIBULAR GLAND: Primary | ICD-10-CM

## 2025-02-05 DIAGNOSIS — J11.1 INFLUENZA-LIKE ILLNESS: ICD-10-CM

## 2025-02-05 LAB
ALBUMIN SERPL-MCNC: 2.9 G/DL (ref 3.5–5)
ALBUMIN/GLOB SERPL: 0.8 (ref 1.1–2.2)
ALP SERPL-CCNC: 90 U/L (ref 45–117)
ALT SERPL-CCNC: 20 U/L (ref 12–78)
ANION GAP SERPL CALC-SCNC: 7 MMOL/L (ref 2–12)
AST SERPL-CCNC: 13 U/L (ref 15–37)
BASOPHILS # BLD: 0.04 K/UL (ref 0–0.1)
BASOPHILS NFR BLD: 0.7 % (ref 0–1)
BILIRUB SERPL-MCNC: 0.3 MG/DL (ref 0.2–1)
BUN SERPL-MCNC: 18 MG/DL (ref 6–20)
BUN/CREAT SERPL: 23 (ref 12–20)
CALCIUM SERPL-MCNC: 9.1 MG/DL (ref 8.5–10.1)
CHLORIDE SERPL-SCNC: 105 MMOL/L (ref 97–108)
CO2 SERPL-SCNC: 31 MMOL/L (ref 21–32)
CREAT SERPL-MCNC: 0.77 MG/DL (ref 0.55–1.02)
DIFFERENTIAL METHOD BLD: ABNORMAL
EOSINOPHIL # BLD: 0.29 K/UL (ref 0–0.4)
EOSINOPHIL NFR BLD: 4.9 % (ref 0–0.7)
ERYTHROCYTE [DISTWIDTH] IN BLOOD BY AUTOMATED COUNT: 13.7 % (ref 11.5–14.5)
FLUAV RNA SPEC QL NAA+PROBE: DETECTED
FLUBV RNA SPEC QL NAA+PROBE: NOT DETECTED
GLOBULIN SER CALC-MCNC: 3.6 G/DL (ref 2–4)
GLUCOSE SERPL-MCNC: 113 MG/DL (ref 65–100)
HCT VFR BLD AUTO: 39.1 % (ref 35–47)
HGB BLD-MCNC: 12.4 G/DL (ref 11.5–16)
IMM GRANULOCYTES # BLD AUTO: 0.03 K/UL (ref 0–0.04)
IMM GRANULOCYTES NFR BLD AUTO: 0.5 % (ref 0–0.5)
LYMPHOCYTES # BLD: 2.14 K/UL (ref 0.8–3.5)
LYMPHOCYTES NFR BLD: 36 % (ref 12–49)
MCH RBC QN AUTO: 28.9 PG (ref 26–34)
MCHC RBC AUTO-ENTMCNC: 31.7 G/DL (ref 30–36.5)
MCV RBC AUTO: 91.1 FL (ref 80–99)
MONOCYTES # BLD: 0.81 K/UL (ref 0–1)
MONOCYTES NFR BLD: 13.6 % (ref 5–13)
NEUTS SEG # BLD: 2.64 K/UL (ref 1.8–8)
NEUTS SEG NFR BLD: 44.3 % (ref 32–75)
NRBC # BLD: 0 K/UL (ref 0–0.01)
NRBC BLD-RTO: 0 PER 100 WBC
PLATELET # BLD AUTO: 240 K/UL (ref 150–400)
PMV BLD AUTO: 9 FL (ref 8.9–12.9)
POTASSIUM SERPL-SCNC: 3.8 MMOL/L (ref 3.5–5.1)
PROT SERPL-MCNC: 6.5 G/DL (ref 6.4–8.2)
RBC # BLD AUTO: 4.29 M/UL (ref 3.8–5.2)
SARS-COV-2 RNA RESP QL NAA+PROBE: NOT DETECTED
SODIUM SERPL-SCNC: 143 MMOL/L (ref 136–145)
SOURCE: ABNORMAL
WBC # BLD AUTO: 6 K/UL (ref 3.6–11)

## 2025-02-05 PROCEDURE — 70491 CT SOFT TISSUE NECK W/DYE: CPT

## 2025-02-05 PROCEDURE — 6370000000 HC RX 637 (ALT 250 FOR IP): Performed by: EMERGENCY MEDICINE

## 2025-02-05 PROCEDURE — 36415 COLL VENOUS BLD VENIPUNCTURE: CPT

## 2025-02-05 PROCEDURE — 85025 COMPLETE CBC W/AUTO DIFF WBC: CPT

## 2025-02-05 PROCEDURE — 87636 SARSCOV2 & INF A&B AMP PRB: CPT

## 2025-02-05 PROCEDURE — 6360000004 HC RX CONTRAST MEDICATION: Performed by: EMERGENCY MEDICINE

## 2025-02-05 PROCEDURE — 99285 EMERGENCY DEPT VISIT HI MDM: CPT

## 2025-02-05 PROCEDURE — 80053 COMPREHEN METABOLIC PANEL: CPT

## 2025-02-05 RX ORDER — LEVOFLOXACIN 500 MG/1
500 TABLET, FILM COATED ORAL DAILY
Qty: 5 TABLET | Refills: 0 | Status: SHIPPED | OUTPATIENT
Start: 2025-02-05 | End: 2025-02-10

## 2025-02-05 RX ORDER — LEVOFLOXACIN 500 MG/1
500 TABLET, FILM COATED ORAL
Status: COMPLETED | OUTPATIENT
Start: 2025-02-05 | End: 2025-02-05

## 2025-02-05 RX ORDER — IOPAMIDOL 755 MG/ML
100 INJECTION, SOLUTION INTRAVASCULAR
Status: COMPLETED | OUTPATIENT
Start: 2025-02-05 | End: 2025-02-05

## 2025-02-05 RX ADMIN — LEVOFLOXACIN 500 MG: 500 TABLET, FILM COATED ORAL at 23:50

## 2025-02-05 RX ADMIN — IOPAMIDOL 100 ML: 755 INJECTION, SOLUTION INTRAVENOUS at 22:53

## 2025-02-05 ASSESSMENT — LIFESTYLE VARIABLES
HOW OFTEN DO YOU HAVE A DRINK CONTAINING ALCOHOL: NEVER
HOW MANY STANDARD DRINKS CONTAINING ALCOHOL DO YOU HAVE ON A TYPICAL DAY: PATIENT DOES NOT DRINK

## 2025-02-05 ASSESSMENT — ENCOUNTER SYMPTOMS
SORE THROAT: 0
VOMITING: 0
NAUSEA: 0
ABDOMINAL PAIN: 0
SHORTNESS OF BREATH: 0

## 2025-02-05 ASSESSMENT — PAIN SCALES - GENERAL
PAINLEVEL_OUTOF10: 0
PAINLEVEL_OUTOF10: 0
PAINLEVEL_OUTOF10: 8

## 2025-02-05 ASSESSMENT — PAIN DESCRIPTION - LOCATION: LOCATION: NECK

## 2025-02-05 ASSESSMENT — PAIN - FUNCTIONAL ASSESSMENT: PAIN_FUNCTIONAL_ASSESSMENT: 0-10

## 2025-02-05 ASSESSMENT — PAIN DESCRIPTION - ORIENTATION: ORIENTATION: LEFT

## 2025-02-05 ASSESSMENT — PAIN DESCRIPTION - PAIN TYPE: TYPE: ACUTE PAIN

## 2025-02-06 VITALS
OXYGEN SATURATION: 95 % | HEART RATE: 82 BPM | HEIGHT: 66 IN | DIASTOLIC BLOOD PRESSURE: 74 MMHG | RESPIRATION RATE: 17 BRPM | SYSTOLIC BLOOD PRESSURE: 138 MMHG | WEIGHT: 266 LBS | BODY MASS INDEX: 42.75 KG/M2 | TEMPERATURE: 98.2 F

## 2025-02-06 NOTE — ED PROVIDER NOTES
medications.     I have also put together some discharge instructions for patient that include: 1) educational information regarding their diagnosis, 2) how to care for their diagnosis at home, as well a 3) list of reasons why they would want to return to the ED prior to their follow-up appointment, should their condition change.       Eric Hanson MD        Medical Decision Making  Problems Addressed:  Influenza-like illness: acute illness or injury  Sialoadenitis of submandibular gland: acute illness or injury    Amount and/or Complexity of Data Reviewed  Independent Historian: caregiver  External Data Reviewed: notes.  Labs: ordered. Decision-making details documented in ED Course.  Radiology: ordered. Decision-making details documented in ED Course.    Risk  OTC drugs.  Prescription drug management.      MDM  Reviewed: nursing note and vitals  Reviewed previous: labs  Interpretation: labs and CT scan                  Disposition Considerations (Tests considered, Shared Decision Making, Pt Expectation of Test or Tx.):       I am the Primary Clinician of Record.   Eric Hanson MD (electronically signed)    (Please note that parts of this dictation were completed with voice recognition software. Quite often unanticipated grammatical, syntax, homophones, and other interpretive errors are inadvertently transcribed by the computer software. Please disregards these errors. Please excuse any errors that have escaped final proofreading.)             HECTOR Hanson MD  02/06/25 0606

## 2025-02-06 NOTE — ED TRIAGE NOTES
Pt reports swollen area beneath jaw on left side starting last night, worse this AM and improving now. Pt states the pain is 8/10, small palpable area that is mildly firm. No redness or erythema, normal airway function and no difficulty swallowing. Of note pt is Flu A positive per pt but no record in chart.    Pt wearing bilateral knee braces, required wheelchair to get into ED, this is not new for pt. Pt assisted by family.

## 2025-02-06 NOTE — DISCHARGE INSTRUCTIONS
Obtain some dorota and suck on them several times a day this will stimulate saliva production to help resolve the salivary gland obstruction

## 2025-02-24 ENCOUNTER — HOSPITAL ENCOUNTER (OUTPATIENT)
Facility: HOSPITAL | Age: 65
Discharge: HOME | End: 2025-02-26
Payer: MEDICARE

## 2025-02-24 DIAGNOSIS — R00.2 PALPITATION: ICD-10-CM

## 2025-02-24 PROCEDURE — 93225 XTRNL ECG REC<48 HRS REC: CPT

## 2025-02-24 NOTE — NURSING NOTE
Pt and daughter educated on o cardiac event/holter monitor device.  Questions answered along the way.  Skin prepped and device applied.

## 2025-03-15 ENCOUNTER — HOSPITAL ENCOUNTER (EMERGENCY)
Facility: HOSPITAL | Age: 65
Discharge: HOME | End: 2025-03-15
Attending: EMERGENCY MEDICINE
Payer: MEDICARE

## 2025-03-15 VITALS
BODY MASS INDEX: 42.59 KG/M2 | WEIGHT: 265 LBS | RESPIRATION RATE: 17 BRPM | HEART RATE: 74 BPM | DIASTOLIC BLOOD PRESSURE: 77 MMHG | TEMPERATURE: 97.9 F | HEIGHT: 66 IN | OXYGEN SATURATION: 97 % | SYSTOLIC BLOOD PRESSURE: 109 MMHG

## 2025-03-15 DIAGNOSIS — R22.0 FACIAL SWELLING: Primary | ICD-10-CM

## 2025-03-15 DIAGNOSIS — Z98.890 HISTORY OF ROOT CANAL PROCEDURE: ICD-10-CM

## 2025-03-15 PROCEDURE — 99283 EMERGENCY DEPT VISIT LOW MDM: CPT

## 2025-03-15 RX ORDER — CLINDAMYCIN HYDROCHLORIDE 300 MG/1
300 CAPSULE ORAL 3 TIMES DAILY
Qty: 30 CAPSULE | Refills: 0 | Status: SHIPPED | OUTPATIENT
Start: 2025-03-15 | End: 2025-03-25

## 2025-03-15 ASSESSMENT — ENCOUNTER SYMPTOMS
COUGH: 0
EYE REDNESS: 0
FACIAL SWELLING: 1
SHORTNESS OF BREATH: 0
VOMITING: 0
NAUSEA: 0
DIARRHEA: 0
ABDOMINAL PAIN: 0
SORE THROAT: 0

## 2025-03-15 ASSESSMENT — PAIN - FUNCTIONAL ASSESSMENT: PAIN_FUNCTIONAL_ASSESSMENT: NONE - DENIES PAIN

## 2025-03-15 NOTE — ED PROVIDER NOTES
EMERGENCY DEPARTMENT HISTORY AND PHYSICAL EXAM      Date: 3/15/2025  Patient Name: Jenny Cain    History of Presenting Illness     Chief Complaint   Patient presents with    Oral Swelling       History Provided By: Patient     HPI: Jenny Cain, 64 y.o. female with past medical history listed below, presents via private vehicle to the ED with cc of dental pain and facial swelling.  Patient  had a root canal on her right upper central incisor on Wednesday at the local [facility].  She reports she has had some swelling in her face and inner hard palate area where the procedure was performed that bothered her.  Denies any fevers.  Tolerating p.o. without difficulty.  Speaking without difficulty.        There are no other complaints, changes, or physical findings at this time.    PCP: Denton Le MD    No current facility-administered medications on file prior to encounter.     Current Outpatient Medications on File Prior to Encounter   Medication Sig Dispense Refill    ADVAIR DISKUS 100-50 MCG/ACT AEPB diskus inhaler INHALE 1 DOSE BY MOUTH IN THE MORNING AND 1 IN THE EVENING 60 each 5    albuterol sulfate HFA (PROVENTIL;VENTOLIN;PROAIR) 108 (90 Base) MCG/ACT inhaler Inhale 2 puffs into the lungs every 4 hours as needed for Shortness of Breath or Wheezing 18 g 0    naproxen (NAPROSYN) 500 MG tablet Take 1 tablet by mouth 2 times daily as needed for Pain 60 tablet 2    omeprazole (PRILOSEC) 20 MG delayed release capsule Take 1 capsule by mouth daily 90 capsule 1    alendronate (FOSAMAX) 70 MG tablet Take 1 tablet by mouth every 7 days 12 tablet 1    atorvastatin (LIPITOR) 40 MG tablet Take 1 tablet by mouth daily 90 tablet 1    cetirizine (EQ ALLERGY RELIEF, CETIRIZINE,) 10 MG tablet Take 1 tablet by mouth daily 90 tablet 1    venlafaxine (EFFEXOR XR) 37.5 MG extended release capsule TAKE 1 CAPSULE BY MOUTH ONCE DAILY ALONG  WITH  75MG  CAPSULES 90 capsule 0    venlafaxine (EFFEXOR XR) 75 MG  Behavior: Behavior normal.               Diagnostic Study Results     Labs -   No results found for this or any previous visit (from the past 12 hours).    Radiologic Studies -   No orders to display     [unfilled]  [unfilled]      Medical Decision Making   I am the first provider for this patient.    I reviewed the vital signs, available nursing notes, past medical history, past surgical history, family history and social history.    Vital Signs-Reviewed the patient's vital signs.  Patient Vitals for the past 12 hrs:   Temp Pulse Resp BP SpO2   03/15/25 1539 97.9 °F (36.6 °C) 74 17 109/77 97 %           Records Reviewed: Nursing notes reviewed    Provider Notes (Medical Decision Making):   DDX: 64-year-old female with pain and gingival swelling after a root canal procedure on her right central incisor, upper.  Performed on Wednesday at the local [facility] by visiting dentist.  No significant infection suspected.  Suspect this is most likely inflammatory postprocedural change.  Nonetheless without timely follow-up available locally will treat with clindamycin 300 3 times daily x 10 days.    ED Course:   Initial assessment performed. The patients presenting problems have been discussed, and they are in agreement with the care plan formulated and outlined with them.  I have encouraged them to ask questions as they arise throughout their visit.           PROGRESS NOTE              Progress note:    Pt  ready for discharge. U  Will follow up as instructed. All questions have been answered, pt voiced understanding and agreement with plan.         Abx were prescribed, pt advised that diarrhea and rash are possible side effects of the medications.     Specific return precautions provided as well as instructions to return to the ED should sx worsen at any time. Vital signs stable for discharge.     I have also put together some discharge instructions for them that include: 1) educational information regarding their

## 2025-04-29 ENCOUNTER — HOSPITAL ENCOUNTER (OUTPATIENT)
Facility: HOSPITAL | Age: 65
Setting detail: SPECIMEN
Discharge: HOME | End: 2025-05-02

## 2025-04-29 LAB
APPEARANCE UR: CLEAR
BACTERIA URNS QL MICRO: ABNORMAL /HPF
BILIRUB UR QL: NEGATIVE
COLOR UR: ABNORMAL
EPITH CASTS URNS QL MICRO: ABNORMAL /LPF
GLUCOSE UR STRIP.AUTO-MCNC: NEGATIVE MG/DL
HGB UR QL STRIP: ABNORMAL
KETONES UR QL STRIP.AUTO: NEGATIVE MG/DL
LEUKOCYTE ESTERASE UR QL STRIP.AUTO: ABNORMAL
NITRITE UR QL STRIP.AUTO: POSITIVE
PH UR STRIP: 6 (ref 5–8)
PROT UR STRIP-MCNC: NEGATIVE MG/DL
RBC #/AREA URNS HPF: ABNORMAL /HPF (ref 0–5)
SP GR UR REFRACTOMETRY: 1.02 (ref 1–1.03)
URINE CULTURE IF INDICATED: ABNORMAL
UROBILINOGEN UR QL STRIP.AUTO: 1 EU/DL (ref 0.2–1)
WBC URNS QL MICRO: >100 /HPF (ref 0–4)

## 2025-04-29 PROCEDURE — 87186 SC STD MICRODIL/AGAR DIL: CPT

## 2025-04-29 PROCEDURE — 87088 URINE BACTERIA CULTURE: CPT

## 2025-04-29 PROCEDURE — 87086 URINE CULTURE/COLONY COUNT: CPT

## 2025-04-29 PROCEDURE — 81001 URINALYSIS AUTO W/SCOPE: CPT

## 2025-05-02 LAB
BACTERIA SPEC CULT: ABNORMAL
CC UR VC: ABNORMAL
SERVICE CMNT-IMP: ABNORMAL

## 2025-05-16 NOTE — TELEPHONE ENCOUNTER
Sent PT a Mychart appt to sched.   Show Applicator Variable?: Yes Render Note In Bullet Format When Appropriate: No Consent: The patient's consent was obtained including but not limited to risks of crusting, scabbing, blistering, scarring, darker or lighter pigmentary change, recurrence, incomplete removal and infection. Duration Of Freeze Thaw-Cycle (Seconds): 0 Post-Care Instructions: I reviewed with the patient in detail post-care instructions. Patient is to wear sunprotection, and avoid picking at any of the treated lesions. Pt may apply Vaseline to crusted or scabbing areas. Detail Level: Detailed

## 2025-06-28 ENCOUNTER — APPOINTMENT (OUTPATIENT)
Facility: HOSPITAL | Age: 65
End: 2025-06-28
Payer: MEDICARE

## 2025-06-28 ENCOUNTER — HOSPITAL ENCOUNTER (EMERGENCY)
Facility: HOSPITAL | Age: 65
Discharge: HOME | End: 2025-06-28
Attending: FAMILY MEDICINE | Admitting: FAMILY MEDICINE
Payer: MEDICARE

## 2025-06-28 VITALS
BODY MASS INDEX: 42.77 KG/M2 | TEMPERATURE: 98.4 F | HEART RATE: 89 BPM | OXYGEN SATURATION: 97 % | RESPIRATION RATE: 18 BRPM | WEIGHT: 265 LBS | DIASTOLIC BLOOD PRESSURE: 83 MMHG | SYSTOLIC BLOOD PRESSURE: 114 MMHG

## 2025-06-28 DIAGNOSIS — S29.011A MUSCLE STRAIN OF CHEST WALL, INITIAL ENCOUNTER: Primary | ICD-10-CM

## 2025-06-28 DIAGNOSIS — S23.9XXA THORACIC BACK SPRAIN, INITIAL ENCOUNTER: ICD-10-CM

## 2025-06-28 PROCEDURE — 72128 CT CHEST SPINE W/O DYE: CPT

## 2025-06-28 PROCEDURE — 71045 X-RAY EXAM CHEST 1 VIEW: CPT

## 2025-06-28 PROCEDURE — 99284 EMERGENCY DEPT VISIT MOD MDM: CPT

## 2025-06-28 PROCEDURE — 73030 X-RAY EXAM OF SHOULDER: CPT

## 2025-06-28 PROCEDURE — 72125 CT NECK SPINE W/O DYE: CPT

## 2025-06-28 PROCEDURE — 6370000000 HC RX 637 (ALT 250 FOR IP): Performed by: FAMILY MEDICINE

## 2025-06-28 PROCEDURE — 93005 ELECTROCARDIOGRAM TRACING: CPT | Performed by: EMERGENCY MEDICINE

## 2025-06-28 RX ORDER — ACETAMINOPHEN 500 MG
1000 TABLET ORAL
Status: COMPLETED | OUTPATIENT
Start: 2025-06-28 | End: 2025-06-28

## 2025-06-28 RX ADMIN — ACETAMINOPHEN 1000 MG: 500 TABLET ORAL at 20:05

## 2025-06-28 ASSESSMENT — PAIN - FUNCTIONAL ASSESSMENT: PAIN_FUNCTIONAL_ASSESSMENT: 0-10

## 2025-06-28 ASSESSMENT — PAIN DESCRIPTION - ORIENTATION: ORIENTATION: LEFT;RIGHT

## 2025-06-28 ASSESSMENT — PAIN SCALES - GENERAL
PAINLEVEL_OUTOF10: 8
PAINLEVEL_OUTOF10: 10
PAINLEVEL_OUTOF10: 7

## 2025-06-28 NOTE — ED PROVIDER NOTES
[facility] EMERGENCY DEPARTMENT  EMERGENCY DEPARTMENT ENCOUNTER       Pt Name: Jenny Cain  MRN: [id number]  Birthdate 1960  Date of evaluation: 6/28/2025  Provider: Katelynn Stevenson MD   PCP: Denton Le MD  Note Started: 7:15 PM EDT 6/28/25     CHIEF COMPLAINT       Chief Complaint   Patient presents with    Fall        HISTORY OF PRESENT ILLNESS: 1 or more elements      History From: Patient  HPI Limitations: None     Jenny Cain is a 64 y.o. female who presents to the ED with pain in her chest, shoulders and upper back after a fall last night.       Nursing Notes were all reviewed and agreed with or any disagreements were addressed in the HPI.     REVIEW OF SYSTEMS      Review of Systems     Positives and Pertinent negatives as per HPI.    PAST HISTORY     Past Medical History:  Past Medical History:   Diagnosis Date    AF (paroxysmal atrial fibrillation) (HCC) 04/2021    Asthma     DJD (degenerative joint disease)     Fibromyalgia     GERD (gastroesophageal reflux disease)     Hypertension     Ill-defined condition     cholestrol    Menopause     age 44    Migraine     Rheumatoid arthritis (HCC)     Sleep disorder     sleep apnea uses CPAP at night         Past Surgical History:  Past Surgical History:   Procedure Laterality Date    CATARACT REMOVAL Right 09/05/2020    HEENT  2021    strabismus correction       Family History:  Family History   Problem Relation Age of Onset    Hypertension Sister     Diabetes Mother        Social History:  Social History     Tobacco Use    Smoking status: Never     Passive exposure: Never    Smokeless tobacco: Never   Vaping Use    Vaping status: Former   Substance Use Topics    Alcohol use: Yes     Alcohol/week: 2.0 standard drinks of alcohol    Drug use: No       Allergies:  Allergies   Allergen Reactions    Ace Inhibitors Angioedema and Other (See Comments)     Product containing angiotensin-converting enzyme inhibitor (product)    Aspirin Other

## 2025-06-28 NOTE — ED TRIAGE NOTES
Pt arrived with c/o bilateral upper shoulder, chest/rib soreness and knee pain that started after a fall on her porch last night. Daughter states she leaned forward to get up out of a chair and fell forward catching herself with her hands and knees.

## 2025-06-29 LAB
EKG ATRIAL RATE: 89 BPM
EKG DIAGNOSIS: NORMAL
EKG P AXIS: 55 DEGREES
EKG P-R INTERVAL: 150 MS
EKG Q-T INTERVAL: 380 MS
EKG QRS DURATION: 72 MS
EKG QTC CALCULATION (BAZETT): 462 MS
EKG R AXIS: 11 DEGREES
EKG T AXIS: 52 DEGREES
EKG VENTRICULAR RATE: 89 BPM

## 2025-06-29 NOTE — DISCHARGE INSTRUCTIONS
--Tylenol 1000 mg every 6 hours as needed for pain.  --Naproxen 500 mg every 10-12 hours as needed for pain. Take with food.

## 2025-06-29 NOTE — DISCHARGE INSTR - COC
M50.30    Osteoarthritis of left shoulder M19.012    Generalized joint pain M25.50    Drug-induced constipation K59.03       Isolation/Infection:   Isolation            No Isolation          Patient Infection Status    No active infections.   Resolved       Infection Onset Added Last Indicated Last Indicated By Resolved Resolved By    Influenza 25 COVID-19 & Influenza Combo 02/15/25 history Infection     COVID-19 10/31/22 11/02/22 11/02/22 Conversion, Epic 22 Conversion, Epic                         Nurse Assessment:  Last Vital Signs: /83   Pulse 89   Temp 98.4 °F (36.9 °C)   Resp 18   Wt 120.2 kg (265 lb)   SpO2 97%   BMI 42.77 kg/m²     Last documented pain score (0-10 scale): Pain Level: 7  Last Weight:   Wt Readings from Last 1 Encounters:   25 120.2 kg (265 lb)     Mental Status:  {IP PT MENTAL STATUS:}    IV Access:  { SUSANNE IV ACCESS:822919730}    Nursing Mobility/ADLs:  Walking   {CHP DME ADLs:825109989}  Transfer  {CHP DME ADLs:172409158}  Bathing  {CHP DME ADLs:185809910}  Dressing  {CHP DME ADLs:140729477}  Toileting  {CHP DME ADLs:794474592}  Feeding  {CHP DME ADLs:035545294}  Med Admin  {P DME ADLs:510493654}  Med Delivery   { SUSANNE MED Delivery:613552204}    Wound Care Documentation and Therapy:        Elimination:  Continence:   Bowel: {YES / NO:}  Bladder: {YES / NO:}  Urinary Catheter: {Urinary Catheter:481169512}   Colostomy/Ileostomy/Ileal Conduit: {YES / NO:}       Date of Last BM: ***  No intake or output data in the 24 hours ending 25  No intake/output data recorded.    Safety Concerns:     { SUSANNE Safety Concerns:863737343}    Impairments/Disabilities:      { SUSANNE Impairments/Disabilities:276601449}    Nutrition Therapy:  Current Nutrition Therapy:   { SUSANNE Diet List:416057760}    Routes of Feeding: {P DME Other Feedings:854577198}  Liquids: {Slp liquid thickness:63761}  Daily Fluid Restriction: {CHP

## 2025-08-12 ENCOUNTER — HOSPITAL ENCOUNTER (OUTPATIENT)
Facility: HOSPITAL | Age: 65
Setting detail: SPECIMEN
Discharge: HOME | End: 2025-08-15
Payer: MEDICARE

## 2025-08-12 PROCEDURE — 87088 URINE BACTERIA CULTURE: CPT

## 2025-08-12 PROCEDURE — 87186 SC STD MICRODIL/AGAR DIL: CPT

## 2025-08-12 PROCEDURE — 87086 URINE CULTURE/COLONY COUNT: CPT

## 2025-08-14 LAB
BACTERIA SPEC CULT: ABNORMAL
CC UR VC: ABNORMAL
SERVICE CMNT-IMP: ABNORMAL

## (undated) DEVICE — CATHETER CHOLGM 4.5FR L18IN W/ MTL SUPP TB

## (undated) DEVICE — Device

## (undated) DEVICE — APPLIER CLP M/L SHFT DIA5MM 15 LIG LIGAMAX 5

## (undated) DEVICE — Z DISCONTINUED NO SUB IDED SET EXTN W/ 4 W STPCOCK M SPIN LOK 36IN

## (undated) DEVICE — SOL INJ SOD CL 0.9% 500ML BG --

## (undated) DEVICE — KIT,1200CC CANISTER,3/16"X6' TUBING: Brand: MEDLINE INDUSTRIES, INC.

## (undated) DEVICE — STRIP,CLOSURE,WOUND,MEDI-STRIP,1/2X4: Brand: MEDLINE

## (undated) DEVICE — TUBING IRRIG L77IN DIA0.241IN L BOR FOR CYSTO W/ NVENT

## (undated) DEVICE — LAPAROSCOPIC TROCAR SLEEVE/SINGLE USE: Brand: KII® OPTICAL ACCESS SYSTEM

## (undated) DEVICE — SUTURE MCRYL SZ 4-0 L27IN ABSRB UD L19MM PS-2 1/2 CIR PRIM Y426H

## (undated) DEVICE — GLOVE SURG SZ 65 THK91MIL LTX FREE SYN POLYISOPRENE

## (undated) DEVICE — SUTURE SZ 0 27IN 5/8 CIR UR-6  TAPER PT VIOLET ABSRB VICRYL J603H

## (undated) DEVICE — GARMENT,MEDLINE,DVT,INT,CALF,MED, GEN2: Brand: MEDLINE

## (undated) DEVICE — 1000ML,PRESSURE INFUSER W/STOPCOCK: Brand: MEDLINE

## (undated) DEVICE — TROCAR LAP L100MM DIA5MM BLDELSS W/ STBL SL ENDOPATH XCEL

## (undated) DEVICE — GENERAL LAPAROSCOPY-MRMC: Brand: MEDLINE INDUSTRIES, INC.

## (undated) DEVICE — ELECTRODE ES SHFT L29CM HK OD5MM ENDOPATH PRB + II

## (undated) DEVICE — CATHETER IV 14GA L2IN POLYUR STR ORNG HUB SFTY RADPQ DISP

## (undated) DEVICE — C-ARM: Brand: UNBRANDED

## (undated) DEVICE — NEEDLE HYPO 22GA L1.5IN BLK S STL HUB POLYPR SHLD REG BVL

## (undated) DEVICE — DECANTER BAG 9": Brand: MEDLINE INDUSTRIES, INC.

## (undated) DEVICE — VISUALIZATION SYSTEM: Brand: CLEARIFY

## (undated) DEVICE — TUBING, SUCTION, 1/4" X 10', STRAIGHT: Brand: MEDLINE

## (undated) DEVICE — SUTURE PERMA HND SZ 2 0 L18IN NONABSORBABLE BLK L19MM PS 2 583H

## (undated) DEVICE — HANDLE PRB DIA5MM HND CTRL PSTL GRP ENDOPATH PRB + II

## (undated) DEVICE — SYR LR LCK 1ML GRAD NSAF 30ML --